# Patient Record
Sex: MALE | Race: WHITE | NOT HISPANIC OR LATINO | Employment: OTHER | URBAN - METROPOLITAN AREA
[De-identification: names, ages, dates, MRNs, and addresses within clinical notes are randomized per-mention and may not be internally consistent; named-entity substitution may affect disease eponyms.]

---

## 2020-01-01 ENCOUNTER — APPOINTMENT (OUTPATIENT)
Dept: MRI IMAGING | Facility: HOSPITAL | Age: 75
End: 2020-01-01

## 2020-01-01 ENCOUNTER — APPOINTMENT (OUTPATIENT)
Dept: GENERAL RADIOLOGY | Facility: HOSPITAL | Age: 75
End: 2020-01-01

## 2020-01-01 ENCOUNTER — LAB REQUISITION (OUTPATIENT)
Dept: LAB | Facility: HOSPITAL | Age: 75
End: 2020-01-01

## 2020-01-01 ENCOUNTER — HOSPITAL ENCOUNTER (INPATIENT)
Facility: HOSPITAL | Age: 75
LOS: 13 days | Discharge: HOSPICE | End: 2020-08-14
Attending: EMERGENCY MEDICINE | Admitting: INTERNAL MEDICINE

## 2020-01-01 ENCOUNTER — APPOINTMENT (OUTPATIENT)
Dept: CT IMAGING | Facility: HOSPITAL | Age: 75
End: 2020-01-01

## 2020-01-01 ENCOUNTER — HOSPITAL ENCOUNTER (INPATIENT)
Facility: HOSPITAL | Age: 75
LOS: 1 days | End: 2020-08-14
Attending: INTERNAL MEDICINE | Admitting: INTERNAL MEDICINE

## 2020-01-01 VITALS
DIASTOLIC BLOOD PRESSURE: 31 MMHG | WEIGHT: 163.8 LBS | HEIGHT: 67 IN | TEMPERATURE: 98.6 F | SYSTOLIC BLOOD PRESSURE: 64 MMHG | OXYGEN SATURATION: 96 % | RESPIRATION RATE: 16 BRPM | BODY MASS INDEX: 25.71 KG/M2 | HEART RATE: 57 BPM

## 2020-01-01 DIAGNOSIS — G93.41 ACUTE METABOLIC ENCEPHALOPATHY: Primary | ICD-10-CM

## 2020-01-01 DIAGNOSIS — Z00.00 ROUTINE GENERAL MEDICAL EXAMINATION AT A HEALTH CARE FACILITY: ICD-10-CM

## 2020-01-01 DIAGNOSIS — N18.6 ESRD (END STAGE RENAL DISEASE) ON DIALYSIS (HCC): ICD-10-CM

## 2020-01-01 DIAGNOSIS — N39.0 ACUTE UTI: ICD-10-CM

## 2020-01-01 DIAGNOSIS — Z99.2 ESRD (END STAGE RENAL DISEASE) ON DIALYSIS (HCC): ICD-10-CM

## 2020-01-01 DIAGNOSIS — R77.8 ELEVATED TROPONIN: ICD-10-CM

## 2020-01-01 LAB
ABO GROUP BLD: NORMAL
ALBUMIN SERPL-MCNC: 2.6 G/DL (ref 3.5–5.2)
ALBUMIN SERPL-MCNC: 2.6 G/DL (ref 3.5–5.2)
ALBUMIN SERPL-MCNC: 2.8 G/DL (ref 3.5–5.2)
ALBUMIN SERPL-MCNC: 2.9 G/DL (ref 3.5–5.2)
ALBUMIN SERPL-MCNC: 3 G/DL (ref 3.5–5.2)
ALBUMIN SERPL-MCNC: 3.1 G/DL (ref 3.5–5.2)
ALBUMIN SERPL-MCNC: 3.2 G/DL (ref 3.5–5.2)
ALBUMIN SERPL-MCNC: 3.2 G/DL (ref 3.5–5.2)
ALBUMIN/GLOB SERPL: 0.8 G/DL
ALBUMIN/GLOB SERPL: 0.9 G/DL
ALBUMIN/GLOB SERPL: 1.1 G/DL
ALP SERPL-CCNC: 155 U/L (ref 39–117)
ALP SERPL-CCNC: 59 U/L (ref 39–117)
ALP SERPL-CCNC: 66 U/L (ref 39–117)
ALT SERPL W P-5'-P-CCNC: 14 U/L (ref 1–41)
ALT SERPL W P-5'-P-CCNC: 15 U/L (ref 1–41)
ALT SERPL W P-5'-P-CCNC: 35 U/L (ref 1–41)
AMMONIA BLD-SCNC: 64 UMOL/L (ref 16–60)
AMPHET+METHAMPHET UR QL: NEGATIVE
AMYLASE SERPL-CCNC: 37 U/L (ref 28–100)
ANION GAP SERPL CALCULATED.3IONS-SCNC: 11.4 MMOL/L (ref 5–15)
ANION GAP SERPL CALCULATED.3IONS-SCNC: 11.9 MMOL/L (ref 5–15)
ANION GAP SERPL CALCULATED.3IONS-SCNC: 12 MMOL/L (ref 5–15)
ANION GAP SERPL CALCULATED.3IONS-SCNC: 12.1 MMOL/L (ref 5–15)
ANION GAP SERPL CALCULATED.3IONS-SCNC: 12.6 MMOL/L (ref 5–15)
ANION GAP SERPL CALCULATED.3IONS-SCNC: 12.7 MMOL/L (ref 5–15)
ANION GAP SERPL CALCULATED.3IONS-SCNC: 12.9 MMOL/L (ref 5–15)
ANION GAP SERPL CALCULATED.3IONS-SCNC: 12.9 MMOL/L (ref 5–15)
ANION GAP SERPL CALCULATED.3IONS-SCNC: 13.2 MMOL/L (ref 5–15)
ANION GAP SERPL CALCULATED.3IONS-SCNC: 13.4 MMOL/L (ref 5–15)
ANION GAP SERPL CALCULATED.3IONS-SCNC: 13.5 MMOL/L (ref 5–15)
ANION GAP SERPL CALCULATED.3IONS-SCNC: 6.3 MMOL/L (ref 5–15)
ANION GAP SERPL CALCULATED.3IONS-SCNC: 6.7 MMOL/L (ref 5–15)
ANION GAP SERPL CALCULATED.3IONS-SCNC: 7.5 MMOL/L (ref 5–15)
ANION GAP SERPL CALCULATED.3IONS-SCNC: 8.6 MMOL/L (ref 5–15)
ANISOCYTOSIS BLD QL: ABNORMAL
APAP SERPL-MCNC: <5 MCG/ML (ref 10–30)
APTT PPP: 25 SECONDS (ref 22.7–35.4)
AST SERPL-CCNC: 19 U/L (ref 1–40)
AST SERPL-CCNC: 19 U/L (ref 1–40)
AST SERPL-CCNC: 53 U/L (ref 1–40)
B PARAPERT DNA SPEC QL NAA+PROBE: NOT DETECTED
B PERT DNA SPEC QL NAA+PROBE: NOT DETECTED
BACTERIA SPEC AEROBE CULT: NO GROWTH
BACTERIA UR QL AUTO: ABNORMAL /HPF
BARBITURATES UR QL SCN: NEGATIVE
BASOPHILS # BLD AUTO: 0.04 10*3/MM3 (ref 0–0.2)
BASOPHILS # BLD AUTO: 0.07 10*3/MM3 (ref 0–0.2)
BASOPHILS # BLD AUTO: 0.07 10*3/MM3 (ref 0–0.2)
BASOPHILS # BLD AUTO: 0.08 10*3/MM3 (ref 0–0.2)
BASOPHILS # BLD AUTO: 0.09 10*3/MM3 (ref 0–0.2)
BASOPHILS # BLD AUTO: 0.09 10*3/MM3 (ref 0–0.2)
BASOPHILS # BLD AUTO: 0.1 10*3/MM3 (ref 0–0.2)
BASOPHILS # BLD MANUAL: 0.06 10*3/MM3 (ref 0–0.2)
BASOPHILS # BLD MANUAL: 0.13 10*3/MM3 (ref 0–0.2)
BASOPHILS NFR BLD AUTO: 0.6 % (ref 0–1.5)
BASOPHILS NFR BLD AUTO: 0.8 % (ref 0–1.5)
BASOPHILS NFR BLD AUTO: 0.9 % (ref 0–1.5)
BASOPHILS NFR BLD AUTO: 1 % (ref 0–1.5)
BASOPHILS NFR BLD AUTO: 1.3 % (ref 0–1.5)
BASOPHILS NFR BLD AUTO: 1.3 % (ref 0–1.5)
BASOPHILS NFR BLD AUTO: 1.5 % (ref 0–1.5)
BASOPHILS NFR BLD AUTO: 1.5 % (ref 0–1.5)
BASOPHILS NFR BLD AUTO: 1.6 % (ref 0–1.5)
BASOPHILS NFR BLD AUTO: 1.8 % (ref 0–1.5)
BASOPHILS NFR BLD AUTO: 2 % (ref 0–1.5)
BENZODIAZ UR QL SCN: NEGATIVE
BH BB BLOOD EXPIRATION DATE: NORMAL
BH BB BLOOD TYPE BARCODE: 5100
BH BB DISPENSE STATUS: NORMAL
BH BB PRODUCT CODE: NORMAL
BH BB UNIT NUMBER: NORMAL
BILIRUB SERPL-MCNC: 0.7 MG/DL (ref 0–1.2)
BILIRUB SERPL-MCNC: 1 MG/DL (ref 0–1.2)
BILIRUB SERPL-MCNC: 1.1 MG/DL (ref 0.2–1.2)
BILIRUB UR QL STRIP: NEGATIVE
BLD GP AB SCN SERPL QL: NEGATIVE
BUN BLD-MCNC: 10 MG/DL (ref 8–23)
BUN BLD-MCNC: 13 MG/DL (ref 8–23)
BUN BLD-MCNC: 23 MG/DL (ref 8–23)
BUN SERPL-MCNC: 18 MG/DL (ref 8–23)
BUN SERPL-MCNC: 28 MG/DL (ref 8–23)
BUN SERPL-MCNC: 29 MG/DL (ref 8–23)
BUN SERPL-MCNC: 34 MG/DL (ref 8–23)
BUN SERPL-MCNC: 38 MG/DL (ref 8–23)
BUN SERPL-MCNC: 40 MG/DL (ref 8–23)
BUN SERPL-MCNC: 47 MG/DL (ref 8–23)
BUN SERPL-MCNC: 48 MG/DL (ref 8–23)
BUN SERPL-MCNC: 56 MG/DL (ref 8–23)
BUN SERPL-MCNC: 58 MG/DL (ref 8–23)
BUN SERPL-MCNC: 60 MG/DL (ref 8–23)
BUN SERPL-MCNC: 80 MG/DL (ref 8–23)
BUN/CREAT SERPL: 10 (ref 7–25)
BUN/CREAT SERPL: 11 (ref 7–25)
BUN/CREAT SERPL: 12.8 (ref 7–25)
BUN/CREAT SERPL: 4 (ref 7–25)
BUN/CREAT SERPL: 4.1 (ref 7–25)
BUN/CREAT SERPL: 4.4 (ref 7–25)
BUN/CREAT SERPL: 5.2 (ref 7–25)
BUN/CREAT SERPL: 5.3 (ref 7–25)
BUN/CREAT SERPL: 5.4 (ref 7–25)
BUN/CREAT SERPL: 5.6 (ref 7–25)
BUN/CREAT SERPL: 6.1 (ref 7–25)
BUN/CREAT SERPL: 6.8 (ref 7–25)
BUN/CREAT SERPL: 7.1 (ref 7–25)
BUN/CREAT SERPL: 8.3 (ref 7–25)
BUN/CREAT SERPL: 9.9 (ref 7–25)
BURR CELLS BLD QL SMEAR: ABNORMAL
C PNEUM DNA NPH QL NAA+NON-PROBE: NOT DETECTED
CALCIUM SPEC-SCNC: 7.8 MG/DL (ref 8.6–10.5)
CALCIUM SPEC-SCNC: 7.9 MG/DL (ref 8.6–10.5)
CALCIUM SPEC-SCNC: 7.9 MG/DL (ref 8.6–10.5)
CALCIUM SPEC-SCNC: 8 MG/DL (ref 8.6–10.5)
CALCIUM SPEC-SCNC: 8 MG/DL (ref 8.6–10.5)
CALCIUM SPEC-SCNC: 8.1 MG/DL (ref 8.6–10.5)
CALCIUM SPEC-SCNC: 8.3 MG/DL (ref 8.6–10.5)
CALCIUM SPEC-SCNC: 8.3 MG/DL (ref 8.6–10.5)
CALCIUM SPEC-SCNC: 8.4 MG/DL (ref 8.6–10.5)
CALCIUM SPEC-SCNC: 8.6 MG/DL (ref 8.6–10.5)
CALCIUM SPEC-SCNC: 9.1 MG/DL (ref 8.6–10.5)
CALCIUM SPEC-SCNC: 9.5 MG/DL (ref 8.6–10.5)
CANNABINOIDS SERPL QL: NEGATIVE
CHLORIDE SERPL-SCNC: 100 MMOL/L (ref 98–107)
CHLORIDE SERPL-SCNC: 101 MMOL/L (ref 98–107)
CHLORIDE SERPL-SCNC: 102 MMOL/L (ref 98–107)
CHLORIDE SERPL-SCNC: 93 MMOL/L (ref 98–107)
CHLORIDE SERPL-SCNC: 94 MMOL/L (ref 98–107)
CHLORIDE SERPL-SCNC: 96 MMOL/L (ref 98–107)
CHLORIDE SERPL-SCNC: 96 MMOL/L (ref 98–107)
CHLORIDE SERPL-SCNC: 97 MMOL/L (ref 98–107)
CHLORIDE SERPL-SCNC: 98 MMOL/L (ref 98–107)
CHLORIDE SERPL-SCNC: 98 MMOL/L (ref 98–107)
CHLORIDE SERPL-SCNC: 99 MMOL/L (ref 98–107)
CHLORIDE SERPL-SCNC: 99 MMOL/L (ref 98–107)
CLARITY UR: CLEAR
CO2 SERPL-SCNC: 21.9 MMOL/L (ref 22–29)
CO2 SERPL-SCNC: 22.4 MMOL/L (ref 22–29)
CO2 SERPL-SCNC: 23.8 MMOL/L (ref 22–29)
CO2 SERPL-SCNC: 24.1 MMOL/L (ref 22–29)
CO2 SERPL-SCNC: 24.5 MMOL/L (ref 22–29)
CO2 SERPL-SCNC: 24.6 MMOL/L (ref 22–29)
CO2 SERPL-SCNC: 25.1 MMOL/L (ref 22–29)
CO2 SERPL-SCNC: 26.3 MMOL/L (ref 22–29)
CO2 SERPL-SCNC: 27.1 MMOL/L (ref 22–29)
CO2 SERPL-SCNC: 28 MMOL/L (ref 22–29)
CO2 SERPL-SCNC: 28.4 MMOL/L (ref 22–29)
CO2 SERPL-SCNC: 29.3 MMOL/L (ref 22–29)
CO2 SERPL-SCNC: 29.6 MMOL/L (ref 22–29)
CO2 SERPL-SCNC: 30.5 MMOL/L (ref 22–29)
CO2 SERPL-SCNC: 30.7 MMOL/L (ref 22–29)
COCAINE UR QL: NEGATIVE
COLOR UR: YELLOW
CREAT BLD-MCNC: 2.48 MG/DL (ref 0.76–1.27)
CREAT BLD-MCNC: 3.19 MG/DL (ref 0.76–1.27)
CREAT BLD-MCNC: 4.29 MG/DL (ref 0.76–1.27)
CREAT BLDA-MCNC: 4 MG/DL (ref 0.6–1.3)
CREAT SERPL-MCNC: 3.39 MG/DL (ref 0.76–1.27)
CREAT SERPL-MCNC: 3.97 MG/DL (ref 0.76–1.27)
CREAT SERPL-MCNC: 4.06 MG/DL (ref 0.76–1.27)
CREAT SERPL-MCNC: 4.38 MG/DL (ref 0.76–1.27)
CREAT SERPL-MCNC: 5.62 MG/DL (ref 0.76–1.27)
CREAT SERPL-MCNC: 6.04 MG/DL (ref 0.76–1.27)
CREAT SERPL-MCNC: 6.26 MG/DL (ref 0.76–1.27)
CREAT SERPL-MCNC: 6.99 MG/DL (ref 0.76–1.27)
CREAT SERPL-MCNC: 7.16 MG/DL (ref 0.76–1.27)
CREAT SERPL-MCNC: 7.19 MG/DL (ref 0.76–1.27)
CREAT SERPL-MCNC: 7.66 MG/DL (ref 0.76–1.27)
CREAT SERPL-MCNC: 8.29 MG/DL (ref 0.76–1.27)
CROSSMATCH INTERPRETATION: NORMAL
DEPRECATED RDW RBC AUTO: 50.3 FL (ref 37–54)
DEPRECATED RDW RBC AUTO: 50.6 FL (ref 37–54)
DEPRECATED RDW RBC AUTO: 50.7 FL (ref 37–54)
DEPRECATED RDW RBC AUTO: 50.7 FL (ref 37–54)
DEPRECATED RDW RBC AUTO: 51.2 FL (ref 37–54)
DEPRECATED RDW RBC AUTO: 52.2 FL (ref 37–54)
DEPRECATED RDW RBC AUTO: 52.2 FL (ref 37–54)
DEPRECATED RDW RBC AUTO: 52.8 FL (ref 37–54)
DEPRECATED RDW RBC AUTO: 53.3 FL (ref 37–54)
DEPRECATED RDW RBC AUTO: 53.5 FL (ref 37–54)
DEPRECATED RDW RBC AUTO: 53.6 FL (ref 37–54)
DEPRECATED RDW RBC AUTO: 53.8 FL (ref 37–54)
DEPRECATED RDW RBC AUTO: 54.6 FL (ref 37–54)
EOSINOPHIL # BLD AUTO: 0.07 10*3/MM3 (ref 0–0.4)
EOSINOPHIL # BLD AUTO: 0.08 10*3/MM3 (ref 0–0.4)
EOSINOPHIL # BLD AUTO: 0.12 10*3/MM3 (ref 0–0.4)
EOSINOPHIL # BLD AUTO: 0.14 10*3/MM3 (ref 0–0.4)
EOSINOPHIL # BLD AUTO: 0.17 10*3/MM3 (ref 0–0.4)
EOSINOPHIL # BLD AUTO: 0.19 10*3/MM3 (ref 0–0.4)
EOSINOPHIL # BLD AUTO: 0.22 10*3/MM3 (ref 0–0.4)
EOSINOPHIL # BLD AUTO: 0.24 10*3/MM3 (ref 0–0.4)
EOSINOPHIL # BLD AUTO: 0.35 10*3/MM3 (ref 0–0.4)
EOSINOPHIL # BLD MANUAL: 0.35 10*3/MM3 (ref 0–0.4)
EOSINOPHIL # BLD MANUAL: 0.8 10*3/MM3 (ref 0–0.4)
EOSINOPHIL NFR BLD AUTO: 0.9 % (ref 0.3–6.2)
EOSINOPHIL NFR BLD AUTO: 1.3 % (ref 0.3–6.2)
EOSINOPHIL NFR BLD AUTO: 1.7 % (ref 0.3–6.2)
EOSINOPHIL NFR BLD AUTO: 2.3 % (ref 0.3–6.2)
EOSINOPHIL NFR BLD AUTO: 2.5 % (ref 0.3–6.2)
EOSINOPHIL NFR BLD AUTO: 3.6 % (ref 0.3–6.2)
EOSINOPHIL NFR BLD AUTO: 3.7 % (ref 0.3–6.2)
EOSINOPHIL NFR BLD AUTO: 4 % (ref 0.3–6.2)
EOSINOPHIL NFR BLD AUTO: 6.1 % (ref 0.3–6.2)
EOSINOPHIL NFR BLD MANUAL: 12 % (ref 0.3–6.2)
EOSINOPHIL NFR BLD MANUAL: 6 % (ref 0.3–6.2)
ERYTHROCYTE [DISTWIDTH] IN BLOOD BY AUTOMATED COUNT: 15.2 % (ref 12.3–15.4)
ERYTHROCYTE [DISTWIDTH] IN BLOOD BY AUTOMATED COUNT: 15.2 % (ref 12.3–15.4)
ERYTHROCYTE [DISTWIDTH] IN BLOOD BY AUTOMATED COUNT: 15.3 % (ref 12.3–15.4)
ERYTHROCYTE [DISTWIDTH] IN BLOOD BY AUTOMATED COUNT: 15.4 % (ref 12.3–15.4)
ERYTHROCYTE [DISTWIDTH] IN BLOOD BY AUTOMATED COUNT: 15.6 % (ref 12.3–15.4)
ERYTHROCYTE [DISTWIDTH] IN BLOOD BY AUTOMATED COUNT: 15.7 % (ref 12.3–15.4)
ERYTHROCYTE [DISTWIDTH] IN BLOOD BY AUTOMATED COUNT: 15.8 % (ref 12.3–15.4)
ERYTHROCYTE [DISTWIDTH] IN BLOOD BY AUTOMATED COUNT: 15.8 % (ref 12.3–15.4)
ETHANOL BLD-MCNC: <10 MG/DL (ref 0–10)
ETHANOL UR QL: <0.01 %
FERRITIN SERPL-MCNC: 372 NG/ML (ref 30–400)
FLUAV H1 2009 PAND RNA NPH QL NAA+PROBE: NOT DETECTED
FLUAV H1 HA GENE NPH QL NAA+PROBE: NOT DETECTED
FLUAV H3 RNA NPH QL NAA+PROBE: NOT DETECTED
FLUAV SUBTYP SPEC NAA+PROBE: NOT DETECTED
FLUBV RNA ISLT QL NAA+PROBE: NOT DETECTED
FOLATE SERPL-MCNC: 3.4 NG/ML (ref 4.78–24.2)
GFR SERPL CREATININE-BSD FRML MDRD: 10 ML/MIN/1.73
GFR SERPL CREATININE-BSD FRML MDRD: 13 ML/MIN/1.73
GFR SERPL CREATININE-BSD FRML MDRD: 14 ML/MIN/1.73
GFR SERPL CREATININE-BSD FRML MDRD: 15 ML/MIN/1.73
GFR SERPL CREATININE-BSD FRML MDRD: 15 ML/MIN/1.73
GFR SERPL CREATININE-BSD FRML MDRD: 17 ML/MIN/1.73
GFR SERPL CREATININE-BSD FRML MDRD: 18 ML/MIN/1.73
GFR SERPL CREATININE-BSD FRML MDRD: 19 ML/MIN/1.73
GFR SERPL CREATININE-BSD FRML MDRD: 23 ML/MIN/1.73
GFR SERPL CREATININE-BSD FRML MDRD: 26 ML/MIN/1.73
GFR SERPL CREATININE-BSD FRML MDRD: 31 ML/MIN/1.73
GFR SERPL CREATININE-BSD FRML MDRD: 6 ML/MIN/1.73
GFR SERPL CREATININE-BSD FRML MDRD: 7 ML/MIN/1.73
GFR SERPL CREATININE-BSD FRML MDRD: 8 ML/MIN/1.73
GFR SERPL CREATININE-BSD FRML MDRD: 9 ML/MIN/1.73
GFR SERPL CREATININE-BSD FRML MDRD: 9 ML/MIN/1.73
GFR SERPL CREATININE-BSD FRML MDRD: ABNORMAL ML/MIN/{1.73_M2}
GLOBULIN UR ELPH-MCNC: 2.8 GM/DL
GLOBULIN UR ELPH-MCNC: 3.5 GM/DL
GLOBULIN UR ELPH-MCNC: 3.5 GM/DL
GLUCOSE BLD-MCNC: 120 MG/DL (ref 65–99)
GLUCOSE BLD-MCNC: 156 MG/DL (ref 65–99)
GLUCOSE BLD-MCNC: 187 MG/DL (ref 65–99)
GLUCOSE BLDC GLUCOMTR-MCNC: 124 MG/DL (ref 70–130)
GLUCOSE BLDC GLUCOMTR-MCNC: 134 MG/DL (ref 70–130)
GLUCOSE BLDC GLUCOMTR-MCNC: 135 MG/DL (ref 70–130)
GLUCOSE BLDC GLUCOMTR-MCNC: 139 MG/DL (ref 70–130)
GLUCOSE BLDC GLUCOMTR-MCNC: 139 MG/DL (ref 70–130)
GLUCOSE BLDC GLUCOMTR-MCNC: 141 MG/DL (ref 70–130)
GLUCOSE BLDC GLUCOMTR-MCNC: 141 MG/DL (ref 70–130)
GLUCOSE BLDC GLUCOMTR-MCNC: 144 MG/DL (ref 70–130)
GLUCOSE BLDC GLUCOMTR-MCNC: 150 MG/DL (ref 70–130)
GLUCOSE BLDC GLUCOMTR-MCNC: 153 MG/DL (ref 70–130)
GLUCOSE BLDC GLUCOMTR-MCNC: 153 MG/DL (ref 70–130)
GLUCOSE BLDC GLUCOMTR-MCNC: 160 MG/DL (ref 70–130)
GLUCOSE BLDC GLUCOMTR-MCNC: 161 MG/DL (ref 70–130)
GLUCOSE BLDC GLUCOMTR-MCNC: 161 MG/DL (ref 70–130)
GLUCOSE BLDC GLUCOMTR-MCNC: 164 MG/DL (ref 70–130)
GLUCOSE BLDC GLUCOMTR-MCNC: 165 MG/DL (ref 70–130)
GLUCOSE BLDC GLUCOMTR-MCNC: 168 MG/DL (ref 70–130)
GLUCOSE BLDC GLUCOMTR-MCNC: 169 MG/DL (ref 70–130)
GLUCOSE BLDC GLUCOMTR-MCNC: 180 MG/DL (ref 70–130)
GLUCOSE BLDC GLUCOMTR-MCNC: 181 MG/DL (ref 70–130)
GLUCOSE BLDC GLUCOMTR-MCNC: 189 MG/DL (ref 70–130)
GLUCOSE BLDC GLUCOMTR-MCNC: 192 MG/DL (ref 70–130)
GLUCOSE BLDC GLUCOMTR-MCNC: 193 MG/DL (ref 70–130)
GLUCOSE BLDC GLUCOMTR-MCNC: 196 MG/DL (ref 70–130)
GLUCOSE BLDC GLUCOMTR-MCNC: 198 MG/DL (ref 70–130)
GLUCOSE BLDC GLUCOMTR-MCNC: 204 MG/DL (ref 70–130)
GLUCOSE BLDC GLUCOMTR-MCNC: 208 MG/DL (ref 70–130)
GLUCOSE BLDC GLUCOMTR-MCNC: 216 MG/DL (ref 70–130)
GLUCOSE BLDC GLUCOMTR-MCNC: 219 MG/DL (ref 70–130)
GLUCOSE BLDC GLUCOMTR-MCNC: 223 MG/DL (ref 70–130)
GLUCOSE BLDC GLUCOMTR-MCNC: 224 MG/DL (ref 70–130)
GLUCOSE BLDC GLUCOMTR-MCNC: 230 MG/DL (ref 70–130)
GLUCOSE BLDC GLUCOMTR-MCNC: 238 MG/DL (ref 70–130)
GLUCOSE BLDC GLUCOMTR-MCNC: 245 MG/DL (ref 70–130)
GLUCOSE BLDC GLUCOMTR-MCNC: 250 MG/DL (ref 70–130)
GLUCOSE BLDC GLUCOMTR-MCNC: 268 MG/DL (ref 70–130)
GLUCOSE BLDC GLUCOMTR-MCNC: 282 MG/DL (ref 70–130)
GLUCOSE BLDC GLUCOMTR-MCNC: 287 MG/DL (ref 70–130)
GLUCOSE BLDC GLUCOMTR-MCNC: 92 MG/DL (ref 70–130)
GLUCOSE SERPL-MCNC: 122 MG/DL (ref 65–99)
GLUCOSE SERPL-MCNC: 142 MG/DL (ref 65–99)
GLUCOSE SERPL-MCNC: 148 MG/DL (ref 65–99)
GLUCOSE SERPL-MCNC: 149 MG/DL (ref 65–99)
GLUCOSE SERPL-MCNC: 151 MG/DL (ref 65–99)
GLUCOSE SERPL-MCNC: 152 MG/DL (ref 65–99)
GLUCOSE SERPL-MCNC: 156 MG/DL (ref 65–99)
GLUCOSE SERPL-MCNC: 157 MG/DL (ref 65–99)
GLUCOSE SERPL-MCNC: 166 MG/DL (ref 65–99)
GLUCOSE SERPL-MCNC: 190 MG/DL (ref 65–99)
GLUCOSE SERPL-MCNC: 207 MG/DL (ref 65–99)
GLUCOSE SERPL-MCNC: 211 MG/DL (ref 65–99)
GLUCOSE UR STRIP-MCNC: ABNORMAL MG/DL
HADV DNA SPEC NAA+PROBE: NOT DETECTED
HAPTOGLOB SERPL-MCNC: 140 MG/DL (ref 30–200)
HBA1C MFR BLD: 5.87 % (ref 4.8–5.6)
HCOV 229E RNA SPEC QL NAA+PROBE: NOT DETECTED
HCOV HKU1 RNA SPEC QL NAA+PROBE: NOT DETECTED
HCOV NL63 RNA SPEC QL NAA+PROBE: NOT DETECTED
HCOV OC43 RNA SPEC QL NAA+PROBE: NOT DETECTED
HCT VFR BLD AUTO: 20 % (ref 37.5–51)
HCT VFR BLD AUTO: 23.3 % (ref 37.5–51)
HCT VFR BLD AUTO: 23.3 % (ref 37.5–51)
HCT VFR BLD AUTO: 24 % (ref 37.5–51)
HCT VFR BLD AUTO: 24.7 % (ref 37.5–51)
HCT VFR BLD AUTO: 26.9 % (ref 37.5–51)
HCT VFR BLD AUTO: 27.6 % (ref 37.5–51)
HCT VFR BLD AUTO: 27.7 % (ref 37.5–51)
HCT VFR BLD AUTO: 28.8 % (ref 37.5–51)
HCT VFR BLD AUTO: 29.4 % (ref 37.5–51)
HCT VFR BLD AUTO: 30.3 % (ref 37.5–51)
HCT VFR BLD AUTO: 31.8 % (ref 37.5–51)
HCT VFR BLD AUTO: 33.9 % (ref 37.5–51)
HEMOCCULT STL QL: POSITIVE
HGB BLD-MCNC: 10 G/DL (ref 13–17.7)
HGB BLD-MCNC: 10.5 G/DL (ref 13–17.7)
HGB BLD-MCNC: 6.5 G/DL (ref 13–17.7)
HGB BLD-MCNC: 7 G/DL (ref 13–17.7)
HGB BLD-MCNC: 7.3 G/DL (ref 13–17.7)
HGB BLD-MCNC: 7.6 G/DL (ref 13–17.7)
HGB BLD-MCNC: 7.9 G/DL (ref 13–17.7)
HGB BLD-MCNC: 8.5 G/DL (ref 13–17.7)
HGB BLD-MCNC: 8.9 G/DL (ref 13–17.7)
HGB BLD-MCNC: 8.9 G/DL (ref 13–17.7)
HGB BLD-MCNC: 9.1 G/DL (ref 13–17.7)
HGB BLD-MCNC: 9.3 G/DL (ref 13–17.7)
HGB BLD-MCNC: 9.6 G/DL (ref 13–17.7)
HGB RETIC QN AUTO: 27.7 PG (ref 29.8–36.1)
HGB UR QL STRIP.AUTO: ABNORMAL
HMPV RNA NPH QL NAA+NON-PROBE: NOT DETECTED
HOLD SPECIMEN: NORMAL
HOLD SPECIMEN: NORMAL
HPIV1 RNA SPEC QL NAA+PROBE: NOT DETECTED
HPIV2 RNA SPEC QL NAA+PROBE: NOT DETECTED
HPIV3 RNA NPH QL NAA+PROBE: NOT DETECTED
HPIV4 P GENE NPH QL NAA+PROBE: NOT DETECTED
HYALINE CASTS UR QL AUTO: ABNORMAL /LPF
HYPOCHROMIA BLD QL: ABNORMAL
IMM GRANULOCYTES # BLD AUTO: 0.04 10*3/MM3 (ref 0–0.05)
IMM GRANULOCYTES # BLD AUTO: 0.06 10*3/MM3 (ref 0–0.05)
IMM GRANULOCYTES # BLD AUTO: 0.06 10*3/MM3 (ref 0–0.05)
IMM GRANULOCYTES # BLD AUTO: 0.07 10*3/MM3 (ref 0–0.05)
IMM GRANULOCYTES # BLD AUTO: 0.08 10*3/MM3 (ref 0–0.05)
IMM GRANULOCYTES # BLD AUTO: 0.1 10*3/MM3 (ref 0–0.05)
IMM GRANULOCYTES # BLD AUTO: 0.14 10*3/MM3 (ref 0–0.05)
IMM GRANULOCYTES # BLD AUTO: 0.21 10*3/MM3 (ref 0–0.05)
IMM GRANULOCYTES # BLD AUTO: 0.22 10*3/MM3 (ref 0–0.05)
IMM GRANULOCYTES NFR BLD AUTO: 0.8 % (ref 0–0.5)
IMM GRANULOCYTES NFR BLD AUTO: 0.9 % (ref 0–0.5)
IMM GRANULOCYTES NFR BLD AUTO: 1 % (ref 0–0.5)
IMM GRANULOCYTES NFR BLD AUTO: 1 % (ref 0–0.5)
IMM GRANULOCYTES NFR BLD AUTO: 1.2 % (ref 0–0.5)
IMM GRANULOCYTES NFR BLD AUTO: 1.3 % (ref 0–0.5)
IMM GRANULOCYTES NFR BLD AUTO: 2.3 % (ref 0–0.5)
IMM GRANULOCYTES NFR BLD AUTO: 3.8 % (ref 0–0.5)
IMM GRANULOCYTES NFR BLD AUTO: 4.1 % (ref 0–0.5)
IMM RETICS NFR: 37.4 % (ref 3–15.8)
INR PPP: 1.08 (ref 0.9–1.1)
IRON 24H UR-MRATE: 50 MCG/DL (ref 59–158)
IRON SATN MFR SERPL: 21 % (ref 20–50)
KETONES UR QL STRIP: NEGATIVE
LDH SERPL-CCNC: 254 U/L (ref 135–225)
LEUKOCYTE ESTERASE UR QL STRIP.AUTO: ABNORMAL
LIPASE SERPL-CCNC: 23 U/L (ref 13–60)
LYMPHOCYTES # BLD AUTO: 0.42 10*3/MM3 (ref 0.7–3.1)
LYMPHOCYTES # BLD AUTO: 0.46 10*3/MM3 (ref 0.7–3.1)
LYMPHOCYTES # BLD AUTO: 0.53 10*3/MM3 (ref 0.7–3.1)
LYMPHOCYTES # BLD AUTO: 0.55 10*3/MM3 (ref 0.7–3.1)
LYMPHOCYTES # BLD AUTO: 0.58 10*3/MM3 (ref 0.7–3.1)
LYMPHOCYTES # BLD AUTO: 0.63 10*3/MM3 (ref 0.7–3.1)
LYMPHOCYTES # BLD AUTO: 0.63 10*3/MM3 (ref 0.7–3.1)
LYMPHOCYTES # BLD AUTO: 0.67 10*3/MM3 (ref 0.7–3.1)
LYMPHOCYTES # BLD AUTO: 0.81 10*3/MM3 (ref 0.7–3.1)
LYMPHOCYTES # BLD MANUAL: 0.69 10*3/MM3 (ref 0.7–3.1)
LYMPHOCYTES # BLD MANUAL: 0.73 10*3/MM3 (ref 0.7–3.1)
LYMPHOCYTES NFR BLD AUTO: 10.4 % (ref 19.6–45.3)
LYMPHOCYTES NFR BLD AUTO: 12.1 % (ref 19.6–45.3)
LYMPHOCYTES NFR BLD AUTO: 13.5 % (ref 19.6–45.3)
LYMPHOCYTES NFR BLD AUTO: 6.8 % (ref 19.6–45.3)
LYMPHOCYTES NFR BLD AUTO: 6.8 % (ref 19.6–45.3)
LYMPHOCYTES NFR BLD AUTO: 8.1 % (ref 19.6–45.3)
LYMPHOCYTES NFR BLD AUTO: 8.5 % (ref 19.6–45.3)
LYMPHOCYTES NFR BLD AUTO: 9 % (ref 19.6–45.3)
LYMPHOCYTES NFR BLD AUTO: 9.6 % (ref 19.6–45.3)
LYMPHOCYTES NFR BLD MANUAL: 11 % (ref 19.6–45.3)
LYMPHOCYTES NFR BLD MANUAL: 12 % (ref 19.6–45.3)
LYMPHOCYTES NFR BLD MANUAL: 3 % (ref 5–12)
LYMPHOCYTES NFR BLD MANUAL: 9 % (ref 5–12)
M PNEUMO IGG SER IA-ACNC: NOT DETECTED
MAGNESIUM SERPL-MCNC: 1.8 MG/DL (ref 1.6–2.4)
MAGNESIUM SERPL-MCNC: 2 MG/DL (ref 1.6–2.4)
MAGNESIUM SERPL-MCNC: 2.1 MG/DL (ref 1.6–2.4)
MCH RBC QN AUTO: 27.9 PG (ref 26.6–33)
MCH RBC QN AUTO: 28.8 PG (ref 26.6–33)
MCH RBC QN AUTO: 29 PG (ref 26.6–33)
MCH RBC QN AUTO: 29.1 PG (ref 26.6–33)
MCH RBC QN AUTO: 29.2 PG (ref 26.6–33)
MCH RBC QN AUTO: 29.3 PG (ref 26.6–33)
MCH RBC QN AUTO: 29.7 PG (ref 26.6–33)
MCH RBC QN AUTO: 29.8 PG (ref 26.6–33)
MCH RBC QN AUTO: 29.8 PG (ref 26.6–33)
MCHC RBC AUTO-ENTMCNC: 30 G/DL (ref 31.5–35.7)
MCHC RBC AUTO-ENTMCNC: 30.8 G/DL (ref 31.5–35.7)
MCHC RBC AUTO-ENTMCNC: 31 G/DL (ref 31.5–35.7)
MCHC RBC AUTO-ENTMCNC: 31 G/DL (ref 31.5–35.7)
MCHC RBC AUTO-ENTMCNC: 31.3 G/DL (ref 31.5–35.7)
MCHC RBC AUTO-ENTMCNC: 31.4 G/DL (ref 31.5–35.7)
MCHC RBC AUTO-ENTMCNC: 31.7 G/DL (ref 31.5–35.7)
MCHC RBC AUTO-ENTMCNC: 31.7 G/DL (ref 31.5–35.7)
MCHC RBC AUTO-ENTMCNC: 32 G/DL (ref 31.5–35.7)
MCHC RBC AUTO-ENTMCNC: 32.1 G/DL (ref 31.5–35.7)
MCHC RBC AUTO-ENTMCNC: 32.3 G/DL (ref 31.5–35.7)
MCHC RBC AUTO-ENTMCNC: 32.5 G/DL (ref 31.5–35.7)
MCHC RBC AUTO-ENTMCNC: 33.1 G/DL (ref 31.5–35.7)
MCV RBC AUTO: 88.2 FL (ref 79–97)
MCV RBC AUTO: 88.8 FL (ref 79–97)
MCV RBC AUTO: 91.1 FL (ref 79–97)
MCV RBC AUTO: 91.3 FL (ref 79–97)
MCV RBC AUTO: 92.3 FL (ref 79–97)
MCV RBC AUTO: 92.3 FL (ref 79–97)
MCV RBC AUTO: 92.8 FL (ref 79–97)
MCV RBC AUTO: 93 FL (ref 79–97)
MCV RBC AUTO: 93.8 FL (ref 79–97)
MCV RBC AUTO: 94.1 FL (ref 79–97)
MCV RBC AUTO: 94.2 FL (ref 79–97)
MCV RBC AUTO: 95.2 FL (ref 79–97)
MCV RBC AUTO: 96.7 FL (ref 79–97)
METAMYELOCYTES NFR BLD MANUAL: 1 % (ref 0–0)
METHADONE UR QL SCN: NEGATIVE
MONOCYTES # BLD AUTO: 0.2 10*3/MM3 (ref 0.1–0.9)
MONOCYTES # BLD AUTO: 0.52 10*3/MM3 (ref 0.1–0.9)
MONOCYTES # BLD AUTO: 0.65 10*3/MM3 (ref 0.1–0.9)
MONOCYTES # BLD AUTO: 0.66 10*3/MM3 (ref 0.1–0.9)
MONOCYTES # BLD AUTO: 0.67 10*3/MM3 (ref 0.1–0.9)
MONOCYTES # BLD AUTO: 0.71 10*3/MM3 (ref 0.1–0.9)
MONOCYTES # BLD AUTO: 0.73 10*3/MM3 (ref 0.1–0.9)
MONOCYTES # BLD AUTO: 0.8 10*3/MM3 (ref 0.1–0.9)
MONOCYTES # BLD AUTO: 0.85 10*3/MM3 (ref 0.1–0.9)
MONOCYTES # BLD AUTO: 0.99 10*3/MM3 (ref 0.1–0.9)
MONOCYTES # BLD AUTO: 1.15 10*3/MM3 (ref 0.1–0.9)
MONOCYTES NFR BLD AUTO: 11.7 % (ref 5–12)
MONOCYTES NFR BLD AUTO: 11.7 % (ref 5–12)
MONOCYTES NFR BLD AUTO: 11.8 % (ref 5–12)
MONOCYTES NFR BLD AUTO: 12.5 % (ref 5–12)
MONOCYTES NFR BLD AUTO: 12.7 % (ref 5–12)
MONOCYTES NFR BLD AUTO: 13.3 % (ref 5–12)
MONOCYTES NFR BLD AUTO: 13.9 % (ref 5–12)
NEUTROPHILS # BLD AUTO: 4.11 10*3/MM3 (ref 1.7–7)
NEUTROPHILS # BLD AUTO: 4.8 10*3/MM3 (ref 1.7–7)
NEUTROPHILS NFR BLD AUTO: 3.51 10*3/MM3 (ref 1.7–7)
NEUTROPHILS NFR BLD AUTO: 3.66 10*3/MM3 (ref 1.7–7)
NEUTROPHILS NFR BLD AUTO: 3.87 10*3/MM3 (ref 1.7–7)
NEUTROPHILS NFR BLD AUTO: 4 10*3/MM3 (ref 1.7–7)
NEUTROPHILS NFR BLD AUTO: 4.26 10*3/MM3 (ref 1.7–7)
NEUTROPHILS NFR BLD AUTO: 4.89 10*3/MM3 (ref 1.7–7)
NEUTROPHILS NFR BLD AUTO: 5.03 10*3/MM3 (ref 1.7–7)
NEUTROPHILS NFR BLD AUTO: 6.06 10*3/MM3 (ref 1.7–7)
NEUTROPHILS NFR BLD AUTO: 6.19 10*3/MM3 (ref 1.7–7)
NEUTROPHILS NFR BLD AUTO: 66.7 % (ref 42.7–76)
NEUTROPHILS NFR BLD AUTO: 67.2 % (ref 42.7–76)
NEUTROPHILS NFR BLD AUTO: 68.7 % (ref 42.7–76)
NEUTROPHILS NFR BLD AUTO: 70.6 % (ref 42.7–76)
NEUTROPHILS NFR BLD AUTO: 70.6 % (ref 42.7–76)
NEUTROPHILS NFR BLD AUTO: 74.1 % (ref 42.7–76)
NEUTROPHILS NFR BLD AUTO: 74.5 % (ref 42.7–76)
NEUTROPHILS NFR BLD AUTO: 77.4 % (ref 42.7–76)
NEUTROPHILS NFR BLD AUTO: 78.6 % (ref 42.7–76)
NEUTROPHILS NFR BLD MANUAL: 71 % (ref 42.7–76)
NEUTROPHILS NFR BLD MANUAL: 72 % (ref 42.7–76)
NITRITE UR QL STRIP: NEGATIVE
NRBC BLD AUTO-RTO: 0 /100 WBC (ref 0–0.2)
NRBC BLD AUTO-RTO: 0.2 /100 WBC (ref 0–0.2)
OPIATES UR QL: NEGATIVE
OXYCODONE UR QL SCN: NEGATIVE
PH UR STRIP.AUTO: 8 [PH] (ref 5–8)
PHOSPHATE SERPL-MCNC: 3.7 MG/DL (ref 2.5–4.5)
PHOSPHATE SERPL-MCNC: 4.6 MG/DL (ref 2.5–4.5)
PHOSPHATE SERPL-MCNC: 4.8 MG/DL (ref 2.5–4.5)
PHOSPHATE SERPL-MCNC: 6 MG/DL (ref 2.5–4.5)
PHOSPHATE SERPL-MCNC: 6.8 MG/DL (ref 2.5–4.5)
PHOSPHATE SERPL-MCNC: 6.8 MG/DL (ref 2.5–4.5)
PHOSPHATE SERPL-MCNC: 8.1 MG/DL (ref 2.5–4.5)
PLAT MORPH BLD: NORMAL
PLAT MORPH BLD: NORMAL
PLATELET # BLD AUTO: 106 10*3/MM3 (ref 140–450)
PLATELET # BLD AUTO: 107 10*3/MM3 (ref 140–450)
PLATELET # BLD AUTO: 109 10*3/MM3 (ref 140–450)
PLATELET # BLD AUTO: 117 10*3/MM3 (ref 140–450)
PLATELET # BLD AUTO: 130 10*3/MM3 (ref 140–450)
PLATELET # BLD AUTO: 78 10*3/MM3 (ref 140–450)
PLATELET # BLD AUTO: 89 10*3/MM3 (ref 140–450)
PLATELET # BLD AUTO: 89 10*3/MM3 (ref 140–450)
PLATELET # BLD AUTO: 90 10*3/MM3 (ref 140–450)
PLATELET # BLD AUTO: 91 10*3/MM3 (ref 140–450)
PLATELET # BLD AUTO: 93 10*3/MM3 (ref 140–450)
PLATELET # BLD AUTO: 94 10*3/MM3 (ref 140–450)
PLATELET # BLD AUTO: 97 10*3/MM3 (ref 140–450)
PLATELET # BLD AUTO: 99 10*3/MM3 (ref 140–450)
PLATELETS.RETICULATED NFR BLD AUTO: 6.1 % (ref 0.9–6.5)
PMV BLD AUTO: 11.1 FL (ref 6–12)
PMV BLD AUTO: 11.1 FL (ref 6–12)
PMV BLD AUTO: 11.2 FL (ref 6–12)
PMV BLD AUTO: 11.3 FL (ref 6–12)
PMV BLD AUTO: 11.4 FL (ref 6–12)
PMV BLD AUTO: 11.4 FL (ref 6–12)
PMV BLD AUTO: 11.6 FL (ref 6–12)
PMV BLD AUTO: 11.6 FL (ref 6–12)
PMV BLD AUTO: 11.8 FL (ref 6–12)
PMV BLD AUTO: 11.9 FL (ref 6–12)
PMV BLD AUTO: 12 FL (ref 6–12)
PMV BLD AUTO: 12 FL (ref 6–12)
PMV BLD AUTO: 12.1 FL (ref 6–12)
POIKILOCYTOSIS BLD QL SMEAR: ABNORMAL
POLYCHROMASIA BLD QL SMEAR: ABNORMAL
POTASSIUM BLD-SCNC: 3.5 MMOL/L (ref 3.5–5.2)
POTASSIUM BLD-SCNC: 3.9 MMOL/L (ref 3.5–5.2)
POTASSIUM BLD-SCNC: 4 MMOL/L (ref 3.5–5.2)
POTASSIUM SERPL-SCNC: 3.7 MMOL/L (ref 3.5–5.2)
POTASSIUM SERPL-SCNC: 3.9 MMOL/L (ref 3.5–5.2)
POTASSIUM SERPL-SCNC: 4 MMOL/L (ref 3.5–5.2)
POTASSIUM SERPL-SCNC: 4.2 MMOL/L (ref 3.5–5.2)
POTASSIUM SERPL-SCNC: 4.2 MMOL/L (ref 3.5–5.2)
POTASSIUM SERPL-SCNC: 4.4 MMOL/L (ref 3.5–5.2)
POTASSIUM SERPL-SCNC: 4.6 MMOL/L (ref 3.5–5.2)
POTASSIUM SERPL-SCNC: 4.6 MMOL/L (ref 3.5–5.2)
POTASSIUM SERPL-SCNC: 4.7 MMOL/L (ref 3.5–5.2)
POTASSIUM SERPL-SCNC: 4.9 MMOL/L (ref 3.5–5.2)
POTASSIUM SERPL-SCNC: 4.9 MMOL/L (ref 3.5–5.2)
POTASSIUM SERPL-SCNC: 5.1 MMOL/L (ref 3.5–5.2)
PROT SERPL-MCNC: 6 G/DL (ref 6–8.5)
PROT SERPL-MCNC: 6.3 G/DL (ref 6–8.5)
PROT SERPL-MCNC: 6.7 G/DL (ref 6–8.5)
PROT UR QL STRIP: ABNORMAL
PROTHROMBIN TIME: 13.9 SECONDS (ref 11.7–14.2)
RBC # BLD AUTO: 2.19 10*6/MM3 (ref 4.14–5.8)
RBC # BLD AUTO: 2.41 10*6/MM3 (ref 4.14–5.8)
RBC # BLD AUTO: 2.51 10*6/MM3 (ref 4.14–5.8)
RBC # BLD AUTO: 2.55 10*6/MM3 (ref 4.14–5.8)
RBC # BLD AUTO: 2.71 10*6/MM3 (ref 4.14–5.8)
RBC # BLD AUTO: 2.9 10*6/MM3 (ref 4.14–5.8)
RBC # BLD AUTO: 3 10*6/MM3 (ref 4.14–5.8)
RBC # BLD AUTO: 3.05 10*6/MM3 (ref 4.14–5.8)
RBC # BLD AUTO: 3.12 10*6/MM3 (ref 4.14–5.8)
RBC # BLD AUTO: 3.16 10*6/MM3 (ref 4.14–5.8)
RBC # BLD AUTO: 3.23 10*6/MM3 (ref 4.14–5.8)
RBC # BLD AUTO: 3.58 10*6/MM3 (ref 4.14–5.8)
RBC # BLD AUTO: 3.6 10*6/MM3 (ref 4.14–5.8)
RBC # UR: ABNORMAL /HPF
REF LAB TEST METHOD: ABNORMAL
RETICS/RBC NFR AUTO: 3.16 % (ref 0.7–1.9)
RH BLD: POSITIVE
RHINOVIRUS RNA SPEC NAA+PROBE: NOT DETECTED
RSV RNA NPH QL NAA+NON-PROBE: NOT DETECTED
SALICYLATES SERPL-MCNC: <0.3 MG/DL
SARS-COV-2 RNA NPH QL NAA+NON-PROBE: NOT DETECTED
SODIUM BLD-SCNC: 137 MMOL/L (ref 136–145)
SODIUM SERPL-SCNC: 130 MMOL/L (ref 136–145)
SODIUM SERPL-SCNC: 131 MMOL/L (ref 136–145)
SODIUM SERPL-SCNC: 133 MMOL/L (ref 136–145)
SODIUM SERPL-SCNC: 134 MMOL/L (ref 136–145)
SODIUM SERPL-SCNC: 135 MMOL/L (ref 136–145)
SODIUM SERPL-SCNC: 136 MMOL/L (ref 136–145)
SODIUM SERPL-SCNC: 138 MMOL/L (ref 136–145)
SODIUM SERPL-SCNC: 138 MMOL/L (ref 136–145)
SODIUM SERPL-SCNC: 139 MMOL/L (ref 136–145)
SODIUM SERPL-SCNC: 140 MMOL/L (ref 136–145)
SP GR UR STRIP: 1.01 (ref 1–1.03)
SQUAMOUS #/AREA URNS HPF: ABNORMAL /HPF
T&S EXPIRATION DATE: NORMAL
TIBC SERPL-MCNC: 234 MCG/DL (ref 298–536)
TRANSFERRIN SERPL-MCNC: 157 MG/DL (ref 200–360)
TROPONIN T SERPL-MCNC: 0.13 NG/ML (ref 0–0.03)
TROPONIN T SERPL-MCNC: 0.15 NG/ML (ref 0–0.03)
TROPONIN T SERPL-MCNC: 5.62 NG/ML (ref 0–0.03)
UNIT  ABO: NORMAL
UNIT  RH: NORMAL
UROBILINOGEN UR QL STRIP: ABNORMAL
VIT B12 BLD-MCNC: 1143 PG/ML (ref 211–946)
WBC # BLD AUTO: 10.38 10*3/MM3 (ref 3.4–10.8)
WBC # BLD AUTO: 5.11 10*3/MM3 (ref 3.4–10.8)
WBC # BLD AUTO: 5.19 10*3/MM3 (ref 3.4–10.8)
WBC # BLD AUTO: 5.75 10*3/MM3 (ref 3.4–10.8)
WBC # BLD AUTO: 6 10*3/MM3 (ref 3.4–10.8)
WBC # BLD AUTO: 6.04 10*3/MM3 (ref 3.4–10.8)
WBC # BLD AUTO: 6.22 10*3/MM3 (ref 3.4–10.8)
WBC # BLD AUTO: 6.67 10*3/MM3 (ref 3.4–10.8)
WBC # BLD AUTO: 6.79 10*3/MM3 (ref 3.4–10.8)
WBC # BLD AUTO: 7.82 10*3/MM3 (ref 3.4–10.8)
WBC # BLD AUTO: 7.87 10*3/MM3 (ref 3.4–10.8)
WBC # BLD AUTO: 8.3 10*3/MM3 (ref 3.4–10.8)
WBC MORPH BLD: NORMAL
WBC MORPH BLD: NORMAL
WBC NRBC COR # BLD: 5.79 10*3/MM3 (ref 3.4–10.8)
WBC UR QL AUTO: ABNORMAL /HPF
WHOLE BLOOD HOLD SPECIMEN: NORMAL
WHOLE BLOOD HOLD SPECIMEN: NORMAL

## 2020-01-01 PROCEDURE — 85025 COMPLETE CBC W/AUTO DIFF WBC: CPT

## 2020-01-01 PROCEDURE — 83735 ASSAY OF MAGNESIUM: CPT | Performed by: EMERGENCY MEDICINE

## 2020-01-01 PROCEDURE — 83690 ASSAY OF LIPASE: CPT

## 2020-01-01 PROCEDURE — 83036 HEMOGLOBIN GLYCOSYLATED A1C: CPT | Performed by: INTERNAL MEDICINE

## 2020-01-01 PROCEDURE — 85025 COMPLETE CBC W/AUTO DIFF WBC: CPT | Performed by: INTERNAL MEDICINE

## 2020-01-01 PROCEDURE — 80307 DRUG TEST PRSMV CHEM ANLYZR: CPT | Performed by: EMERGENCY MEDICINE

## 2020-01-01 PROCEDURE — 99222 1ST HOSP IP/OBS MODERATE 55: CPT | Performed by: INTERNAL MEDICINE

## 2020-01-01 PROCEDURE — 99233 SBSQ HOSP IP/OBS HIGH 50: CPT | Performed by: NURSE PRACTITIONER

## 2020-01-01 PROCEDURE — 86901 BLOOD TYPING SEROLOGIC RH(D): CPT | Performed by: INTERNAL MEDICINE

## 2020-01-01 PROCEDURE — 86850 RBC ANTIBODY SCREEN: CPT | Performed by: EMERGENCY MEDICINE

## 2020-01-01 PROCEDURE — 83615 LACTATE (LD) (LDH) ENZYME: CPT | Performed by: INTERNAL MEDICINE

## 2020-01-01 PROCEDURE — 94799 UNLISTED PULMONARY SVC/PX: CPT

## 2020-01-01 PROCEDURE — 25010000002 IOPAMIDOL 61 % SOLUTION: Performed by: EMERGENCY MEDICINE

## 2020-01-01 PROCEDURE — P9047 ALBUMIN (HUMAN), 25%, 50ML: HCPCS | Performed by: INTERNAL MEDICINE

## 2020-01-01 PROCEDURE — 82962 GLUCOSE BLOOD TEST: CPT

## 2020-01-01 PROCEDURE — 80048 BASIC METABOLIC PNL TOTAL CA: CPT | Performed by: INTERNAL MEDICINE

## 2020-01-01 PROCEDURE — 25010000002 CEFTRIAXONE PER 250 MG: Performed by: EMERGENCY MEDICINE

## 2020-01-01 PROCEDURE — 25010000002 LORAZEPAM PER 2 MG: Performed by: PSYCHIATRY & NEUROLOGY

## 2020-01-01 PROCEDURE — 80053 COMPREHEN METABOLIC PANEL: CPT | Performed by: INTERNAL MEDICINE

## 2020-01-01 PROCEDURE — 82565 ASSAY OF CREATININE: CPT

## 2020-01-01 PROCEDURE — 82728 ASSAY OF FERRITIN: CPT | Performed by: INTERNAL MEDICINE

## 2020-01-01 PROCEDURE — 83735 ASSAY OF MAGNESIUM: CPT | Performed by: ADVANCED PRACTICE MIDWIFE

## 2020-01-01 PROCEDURE — 99232 SBSQ HOSP IP/OBS MODERATE 35: CPT | Performed by: INTERNAL MEDICINE

## 2020-01-01 PROCEDURE — 25010000002 MORPHINE PER 10 MG: Performed by: INTERNAL MEDICINE

## 2020-01-01 PROCEDURE — 93010 ELECTROCARDIOGRAM REPORT: CPT | Performed by: INTERNAL MEDICINE

## 2020-01-01 PROCEDURE — 80069 RENAL FUNCTION PANEL: CPT | Performed by: INTERNAL MEDICINE

## 2020-01-01 PROCEDURE — 99222 1ST HOSP IP/OBS MODERATE 55: CPT | Performed by: PSYCHIATRY & NEUROLOGY

## 2020-01-01 PROCEDURE — 80048 BASIC METABOLIC PNL TOTAL CA: CPT | Performed by: NURSE PRACTITIONER

## 2020-01-01 PROCEDURE — 97110 THERAPEUTIC EXERCISES: CPT

## 2020-01-01 PROCEDURE — 70498 CT ANGIOGRAPHY NECK: CPT

## 2020-01-01 PROCEDURE — 84484 ASSAY OF TROPONIN QUANT: CPT | Performed by: EMERGENCY MEDICINE

## 2020-01-01 PROCEDURE — 85730 THROMBOPLASTIN TIME PARTIAL: CPT | Performed by: EMERGENCY MEDICINE

## 2020-01-01 PROCEDURE — 86923 COMPATIBILITY TEST ELECTRIC: CPT

## 2020-01-01 PROCEDURE — 83010 ASSAY OF HAPTOGLOBIN QUANT: CPT | Performed by: INTERNAL MEDICINE

## 2020-01-01 PROCEDURE — 80048 BASIC METABOLIC PNL TOTAL CA: CPT

## 2020-01-01 PROCEDURE — P9612 CATHETERIZE FOR URINE SPEC: HCPCS

## 2020-01-01 PROCEDURE — 93005 ELECTROCARDIOGRAM TRACING: CPT | Performed by: INTERNAL MEDICINE

## 2020-01-01 PROCEDURE — 70496 CT ANGIOGRAPHY HEAD: CPT

## 2020-01-01 PROCEDURE — 86900 BLOOD TYPING SEROLOGIC ABO: CPT | Performed by: INTERNAL MEDICINE

## 2020-01-01 PROCEDURE — 25010000002 CEFTRIAXONE PER 250 MG: Performed by: INTERNAL MEDICINE

## 2020-01-01 PROCEDURE — 25010000002 LORAZEPAM PER 2 MG: Performed by: INTERNAL MEDICINE

## 2020-01-01 PROCEDURE — P9016 RBC LEUKOCYTES REDUCED: HCPCS

## 2020-01-01 PROCEDURE — 99231 SBSQ HOSP IP/OBS SF/LOW 25: CPT | Performed by: INTERNAL MEDICINE

## 2020-01-01 PROCEDURE — 85610 PROTHROMBIN TIME: CPT | Performed by: EMERGENCY MEDICINE

## 2020-01-01 PROCEDURE — 87086 URINE CULTURE/COLONY COUNT: CPT | Performed by: EMERGENCY MEDICINE

## 2020-01-01 PROCEDURE — 83735 ASSAY OF MAGNESIUM: CPT | Performed by: INTERNAL MEDICINE

## 2020-01-01 PROCEDURE — 82607 VITAMIN B-12: CPT | Performed by: INTERNAL MEDICINE

## 2020-01-01 PROCEDURE — 99232 SBSQ HOSP IP/OBS MODERATE 35: CPT | Performed by: NURSE PRACTITIONER

## 2020-01-01 PROCEDURE — 99285 EMERGENCY DEPT VISIT HI MDM: CPT

## 2020-01-01 PROCEDURE — 85055 RETICULATED PLATELET ASSAY: CPT | Performed by: INTERNAL MEDICINE

## 2020-01-01 PROCEDURE — 80053 COMPREHEN METABOLIC PANEL: CPT | Performed by: EMERGENCY MEDICINE

## 2020-01-01 PROCEDURE — 80053 COMPREHEN METABOLIC PANEL: CPT

## 2020-01-01 PROCEDURE — 63710000001 INSULIN LISPRO (HUMAN) PER 5 UNITS: Performed by: INTERNAL MEDICINE

## 2020-01-01 PROCEDURE — 82150 ASSAY OF AMYLASE: CPT

## 2020-01-01 PROCEDURE — 70551 MRI BRAIN STEM W/O DYE: CPT

## 2020-01-01 PROCEDURE — 81001 URINALYSIS AUTO W/SCOPE: CPT | Performed by: EMERGENCY MEDICINE

## 2020-01-01 PROCEDURE — 25010000002 ONDANSETRON PER 1 MG: Performed by: INTERNAL MEDICINE

## 2020-01-01 PROCEDURE — 25010000003 LEVETIRACETAM IN NACL 0.75% 1000 MG/100ML SOLUTION: Performed by: EMERGENCY MEDICINE

## 2020-01-01 PROCEDURE — 83540 ASSAY OF IRON: CPT | Performed by: INTERNAL MEDICINE

## 2020-01-01 PROCEDURE — 85027 COMPLETE CBC AUTOMATED: CPT | Performed by: INTERNAL MEDICINE

## 2020-01-01 PROCEDURE — 80069 RENAL FUNCTION PANEL: CPT | Performed by: NURSE PRACTITIONER

## 2020-01-01 PROCEDURE — 99221 1ST HOSP IP/OBS SF/LOW 40: CPT | Performed by: INTERNAL MEDICINE

## 2020-01-01 PROCEDURE — 25010000002 HYDROMORPHONE PER 4 MG: Performed by: INTERNAL MEDICINE

## 2020-01-01 PROCEDURE — 85027 COMPLETE CBC AUTOMATED: CPT | Performed by: NURSE PRACTITIONER

## 2020-01-01 PROCEDURE — 82140 ASSAY OF AMMONIA: CPT | Performed by: EMERGENCY MEDICINE

## 2020-01-01 PROCEDURE — 92610 EVALUATE SWALLOWING FUNCTION: CPT

## 2020-01-01 PROCEDURE — 85007 BL SMEAR W/DIFF WBC COUNT: CPT | Performed by: INTERNAL MEDICINE

## 2020-01-01 PROCEDURE — 85025 COMPLETE CBC W/AUTO DIFF WBC: CPT | Performed by: EMERGENCY MEDICINE

## 2020-01-01 PROCEDURE — 86900 BLOOD TYPING SEROLOGIC ABO: CPT

## 2020-01-01 PROCEDURE — 86901 BLOOD TYPING SEROLOGIC RH(D): CPT | Performed by: EMERGENCY MEDICINE

## 2020-01-01 PROCEDURE — 86850 RBC ANTIBODY SCREEN: CPT | Performed by: INTERNAL MEDICINE

## 2020-01-01 PROCEDURE — 93005 ELECTROCARDIOGRAM TRACING: CPT | Performed by: EMERGENCY MEDICINE

## 2020-01-01 PROCEDURE — 85046 RETICYTE/HGB CONCENTRATE: CPT | Performed by: INTERNAL MEDICINE

## 2020-01-01 PROCEDURE — 0042T HC CT CEREBRAL PERFUSION W/WO CONTRAST: CPT

## 2020-01-01 PROCEDURE — 25010000002 EPOETIN ALFA-EPBX 10000 UNIT/ML SOLUTION: Performed by: INTERNAL MEDICINE

## 2020-01-01 PROCEDURE — 97162 PT EVAL MOD COMPLEX 30 MIN: CPT

## 2020-01-01 PROCEDURE — 82272 OCCULT BLD FECES 1-3 TESTS: CPT | Performed by: INTERNAL MEDICINE

## 2020-01-01 PROCEDURE — 84484 ASSAY OF TROPONIN QUANT: CPT | Performed by: INTERNAL MEDICINE

## 2020-01-01 PROCEDURE — 71045 X-RAY EXAM CHEST 1 VIEW: CPT

## 2020-01-01 PROCEDURE — 86900 BLOOD TYPING SEROLOGIC ABO: CPT | Performed by: EMERGENCY MEDICINE

## 2020-01-01 PROCEDURE — 82746 ASSAY OF FOLIC ACID SERUM: CPT | Performed by: INTERNAL MEDICINE

## 2020-01-01 PROCEDURE — 85025 COMPLETE CBC W/AUTO DIFF WBC: CPT | Performed by: NURSE PRACTITIONER

## 2020-01-01 PROCEDURE — 25010000002 ALBUMIN HUMAN 25% PER 50 ML: Performed by: INTERNAL MEDICINE

## 2020-01-01 PROCEDURE — 84466 ASSAY OF TRANSFERRIN: CPT | Performed by: INTERNAL MEDICINE

## 2020-01-01 PROCEDURE — 0202U NFCT DS 22 TRGT SARS-COV-2: CPT | Performed by: EMERGENCY MEDICINE

## 2020-01-01 RX ORDER — LORAZEPAM 2 MG/ML
0.5 INJECTION INTRAMUSCULAR
Status: DISCONTINUED | OUTPATIENT
Start: 2020-01-01 | End: 2020-01-01 | Stop reason: HOSPADM

## 2020-01-01 RX ORDER — MORPHINE SULFATE 2 MG/ML
2 INJECTION, SOLUTION INTRAMUSCULAR; INTRAVENOUS
Status: DISCONTINUED | OUTPATIENT
Start: 2020-01-01 | End: 2020-01-01 | Stop reason: HOSPADM

## 2020-01-01 RX ORDER — SCOLOPAMINE TRANSDERMAL SYSTEM 1 MG/1
1 PATCH, EXTENDED RELEASE TRANSDERMAL
Status: DISCONTINUED | OUTPATIENT
Start: 2020-01-01 | End: 2020-08-15 | Stop reason: HOSPADM

## 2020-01-01 RX ORDER — LORAZEPAM 2 MG/ML
2 CONCENTRATE ORAL
Status: CANCELLED | OUTPATIENT
Start: 2020-01-01 | End: 2020-08-23

## 2020-01-01 RX ORDER — MORPHINE SULFATE 4 MG/ML
4 INJECTION, SOLUTION INTRAMUSCULAR; INTRAVENOUS
Status: DISCONTINUED | OUTPATIENT
Start: 2020-01-01 | End: 2020-08-15 | Stop reason: HOSPADM

## 2020-01-01 RX ORDER — ACETAMINOPHEN 325 MG/1
650 TABLET ORAL EVERY 4 HOURS PRN
Status: DISCONTINUED | OUTPATIENT
Start: 2020-01-01 | End: 2020-08-15 | Stop reason: HOSPADM

## 2020-01-01 RX ORDER — ACETAMINOPHEN 160 MG/5ML
650 SOLUTION ORAL EVERY 4 HOURS PRN
Status: DISCONTINUED | OUTPATIENT
Start: 2020-01-01 | End: 2020-08-15 | Stop reason: HOSPADM

## 2020-01-01 RX ORDER — TEMAZEPAM 15 MG/1
15 CAPSULE ORAL NIGHTLY
COMMUNITY

## 2020-01-01 RX ORDER — SUCRALFATE 1 G/1
1 TABLET ORAL 4 TIMES DAILY
COMMUNITY

## 2020-01-01 RX ORDER — HYDROMORPHONE HYDROCHLORIDE 1 MG/ML
0.5 INJECTION, SOLUTION INTRAMUSCULAR; INTRAVENOUS; SUBCUTANEOUS
Status: DISCONTINUED | OUTPATIENT
Start: 2020-01-01 | End: 2020-08-15 | Stop reason: HOSPADM

## 2020-01-01 RX ORDER — ACETAMINOPHEN 325 MG/1
650 TABLET ORAL EVERY 4 HOURS PRN
Status: DISCONTINUED | OUTPATIENT
Start: 2020-01-01 | End: 2020-01-01 | Stop reason: HOSPADM

## 2020-01-01 RX ORDER — ONDANSETRON 2 MG/ML
4 INJECTION INTRAMUSCULAR; INTRAVENOUS EVERY 6 HOURS PRN
Status: DISCONTINUED | OUTPATIENT
Start: 2020-01-01 | End: 2020-01-01 | Stop reason: HOSPADM

## 2020-01-01 RX ORDER — MORPHINE SULFATE 10 MG/ML
6 INJECTION INTRAMUSCULAR; INTRAVENOUS; SUBCUTANEOUS
Status: CANCELLED | OUTPATIENT
Start: 2020-01-01 | End: 2020-08-23

## 2020-01-01 RX ORDER — MORPHINE SULFATE 20 MG/ML
20 SOLUTION ORAL
Status: DISCONTINUED | OUTPATIENT
Start: 2020-01-01 | End: 2020-08-15 | Stop reason: HOSPADM

## 2020-01-01 RX ORDER — OLANZAPINE 10 MG/1
10 TABLET, ORALLY DISINTEGRATING ORAL 2 TIMES DAILY
Status: DISCONTINUED | OUTPATIENT
Start: 2020-01-01 | End: 2020-01-01

## 2020-01-01 RX ORDER — HYDROMORPHONE HCL 110MG/55ML
1.5 PATIENT CONTROLLED ANALGESIA SYRINGE INTRAVENOUS
Status: DISCONTINUED | OUTPATIENT
Start: 2020-01-01 | End: 2020-08-15 | Stop reason: HOSPADM

## 2020-01-01 RX ORDER — LORAZEPAM 2 MG/ML
0.5 CONCENTRATE ORAL
Status: CANCELLED | OUTPATIENT
Start: 2020-01-01 | End: 2020-08-23

## 2020-01-01 RX ORDER — MORPHINE SULFATE 20 MG/ML
10 SOLUTION ORAL
Status: DISCONTINUED | OUTPATIENT
Start: 2020-01-01 | End: 2020-08-15 | Stop reason: HOSPADM

## 2020-01-01 RX ORDER — ALBUTEROL SULFATE 90 UG/1
2 AEROSOL, METERED RESPIRATORY (INHALATION) EVERY 6 HOURS PRN
COMMUNITY

## 2020-01-01 RX ORDER — CEFTRIAXONE SODIUM 1 G/50ML
1 INJECTION, SOLUTION INTRAVENOUS ONCE
Status: COMPLETED | OUTPATIENT
Start: 2020-01-01 | End: 2020-01-01

## 2020-01-01 RX ORDER — BISACODYL 10 MG
10 SUPPOSITORY, RECTAL RECTAL DAILY
Status: DISCONTINUED | OUTPATIENT
Start: 2020-01-01 | End: 2020-01-01 | Stop reason: HOSPADM

## 2020-01-01 RX ORDER — MORPHINE SULFATE 20 MG/ML
5 SOLUTION ORAL
Status: CANCELLED | OUTPATIENT
Start: 2020-01-01 | End: 2020-08-23

## 2020-01-01 RX ORDER — LORAZEPAM 2 MG/ML
0.5 CONCENTRATE ORAL
Status: DISCONTINUED | OUTPATIENT
Start: 2020-01-01 | End: 2020-01-01 | Stop reason: HOSPADM

## 2020-01-01 RX ORDER — LORAZEPAM 2 MG/ML
0.5 INJECTION INTRAMUSCULAR
Status: CANCELLED | OUTPATIENT
Start: 2020-01-01 | End: 2020-08-23

## 2020-01-01 RX ORDER — LORAZEPAM 2 MG/ML
1 CONCENTRATE ORAL
Status: CANCELLED | OUTPATIENT
Start: 2020-01-01 | End: 2020-08-23

## 2020-01-01 RX ORDER — FLUOXETINE HYDROCHLORIDE 20 MG/1
20 CAPSULE ORAL DAILY
COMMUNITY

## 2020-01-01 RX ORDER — DIPHENOXYLATE HYDROCHLORIDE AND ATROPINE SULFATE 2.5; .025 MG/1; MG/1
1 TABLET ORAL
Status: CANCELLED | OUTPATIENT
Start: 2020-01-01

## 2020-01-01 RX ORDER — NITROGLYCERIN 0.4 MG/1
0.4 TABLET SUBLINGUAL
Status: DISCONTINUED | OUTPATIENT
Start: 2020-01-01 | End: 2020-01-01 | Stop reason: HOSPADM

## 2020-01-01 RX ORDER — MORPHINE SULFATE 10 MG/ML
6 INJECTION INTRAMUSCULAR; INTRAVENOUS; SUBCUTANEOUS
Status: DISCONTINUED | OUTPATIENT
Start: 2020-01-01 | End: 2020-08-15 | Stop reason: HOSPADM

## 2020-01-01 RX ORDER — LORAZEPAM 2 MG/ML
2 CONCENTRATE ORAL
Status: DISCONTINUED | OUTPATIENT
Start: 2020-01-01 | End: 2020-08-15 | Stop reason: HOSPADM

## 2020-01-01 RX ORDER — OLANZAPINE 10 MG/1
10 TABLET, ORALLY DISINTEGRATING ORAL DAILY
Status: DISCONTINUED | OUTPATIENT
Start: 2020-01-01 | End: 2020-01-01 | Stop reason: HOSPADM

## 2020-01-01 RX ORDER — LEVETIRACETAM 10 MG/ML
1000 INJECTION INTRAVASCULAR ONCE
Status: COMPLETED | OUTPATIENT
Start: 2020-01-01 | End: 2020-01-01

## 2020-01-01 RX ORDER — HYDROXYZINE 50 MG/1
50 TABLET, FILM COATED ORAL EVERY 12 HOURS
COMMUNITY

## 2020-01-01 RX ORDER — ACETAMINOPHEN 650 MG/1
650 SUPPOSITORY RECTAL EVERY 4 HOURS PRN
Status: DISCONTINUED | OUTPATIENT
Start: 2020-01-01 | End: 2020-08-15 | Stop reason: HOSPADM

## 2020-01-01 RX ORDER — SCOLOPAMINE TRANSDERMAL SYSTEM 1 MG/1
1 PATCH, EXTENDED RELEASE TRANSDERMAL
Status: DISCONTINUED | OUTPATIENT
Start: 2020-01-01 | End: 2020-01-01 | Stop reason: HOSPADM

## 2020-01-01 RX ORDER — ATORVASTATIN CALCIUM 40 MG/1
40 TABLET, FILM COATED ORAL DAILY
COMMUNITY

## 2020-01-01 RX ORDER — SODIUM CHLORIDE 0.9 % (FLUSH) 0.9 %
10 SYRINGE (ML) INJECTION AS NEEDED
Status: DISCONTINUED | OUTPATIENT
Start: 2020-01-01 | End: 2020-01-01 | Stop reason: HOSPADM

## 2020-01-01 RX ORDER — OLANZAPINE 10 MG/1
10 TABLET, ORALLY DISINTEGRATING ORAL NIGHTLY
Status: DISCONTINUED | OUTPATIENT
Start: 2020-01-01 | End: 2020-01-01

## 2020-01-01 RX ORDER — MORPHINE SULFATE 2 MG/ML
2 INJECTION, SOLUTION INTRAMUSCULAR; INTRAVENOUS
Status: CANCELLED | OUTPATIENT
Start: 2020-01-01 | End: 2020-08-23

## 2020-01-01 RX ORDER — ALBUMIN (HUMAN) 12.5 G/50ML
25 SOLUTION INTRAVENOUS 4 TIMES DAILY PRN
Status: DISCONTINUED | OUTPATIENT
Start: 2020-01-01 | End: 2020-01-01 | Stop reason: HOSPADM

## 2020-01-01 RX ORDER — MORPHINE SULFATE 2 MG/ML
2 INJECTION, SOLUTION INTRAMUSCULAR; INTRAVENOUS ONCE
Status: COMPLETED | OUTPATIENT
Start: 2020-01-01 | End: 2020-01-01

## 2020-01-01 RX ORDER — HYDROCODONE BITARTRATE AND ACETAMINOPHEN 5; 325 MG/1; MG/1
1 TABLET ORAL EVERY 6 HOURS PRN
COMMUNITY

## 2020-01-01 RX ORDER — LORAZEPAM 2 MG/ML
2 INJECTION INTRAMUSCULAR
Status: CANCELLED | OUTPATIENT
Start: 2020-01-01 | End: 2020-08-23

## 2020-01-01 RX ORDER — SCOLOPAMINE TRANSDERMAL SYSTEM 1 MG/1
1 PATCH, EXTENDED RELEASE TRANSDERMAL
Status: CANCELLED | OUTPATIENT
Start: 2020-01-01

## 2020-01-01 RX ORDER — MORPHINE SULFATE 20 MG/ML
10 SOLUTION ORAL
Status: DISCONTINUED | OUTPATIENT
Start: 2020-01-01 | End: 2020-01-01 | Stop reason: HOSPADM

## 2020-01-01 RX ORDER — DIPHENOXYLATE HYDROCHLORIDE AND ATROPINE SULFATE 2.5; .025 MG/1; MG/1
1 TABLET ORAL
Status: DISCONTINUED | OUTPATIENT
Start: 2020-01-01 | End: 2020-08-15 | Stop reason: HOSPADM

## 2020-01-01 RX ORDER — ASPIRIN 81 MG/1
81 TABLET, CHEWABLE ORAL DAILY
Status: DISCONTINUED | OUTPATIENT
Start: 2020-01-01 | End: 2020-01-01 | Stop reason: HOSPADM

## 2020-01-01 RX ORDER — ALBUMIN (HUMAN) 12.5 G/50ML
12.5 SOLUTION INTRAVENOUS AS NEEDED
Status: ACTIVE | OUTPATIENT
Start: 2020-01-01 | End: 2020-01-01

## 2020-01-01 RX ORDER — HYDROMORPHONE HCL 110MG/55ML
1.5 PATIENT CONTROLLED ANALGESIA SYRINGE INTRAVENOUS
Status: CANCELLED | OUTPATIENT
Start: 2020-01-01 | End: 2020-08-23

## 2020-01-01 RX ORDER — MORPHINE SULFATE 20 MG/ML
5 SOLUTION ORAL
Status: DISCONTINUED | OUTPATIENT
Start: 2020-01-01 | End: 2020-01-01 | Stop reason: HOSPADM

## 2020-01-01 RX ORDER — MORPHINE SULFATE 2 MG/ML
6 INJECTION, SOLUTION INTRAMUSCULAR; INTRAVENOUS
Status: DISCONTINUED | OUTPATIENT
Start: 2020-01-01 | End: 2020-01-01 | Stop reason: HOSPADM

## 2020-01-01 RX ORDER — FOLIC ACID 1 MG/1
1 TABLET ORAL DAILY
Status: DISCONTINUED | OUTPATIENT
Start: 2020-01-01 | End: 2020-01-01 | Stop reason: HOSPADM

## 2020-01-01 RX ORDER — DEXTROSE AND SODIUM CHLORIDE 5; .45 G/100ML; G/100ML
100 INJECTION, SOLUTION INTRAVENOUS CONTINUOUS
Status: DISCONTINUED | OUTPATIENT
Start: 2020-01-01 | End: 2020-01-01

## 2020-01-01 RX ORDER — LORAZEPAM 2 MG/ML
1 INJECTION INTRAMUSCULAR
Status: DISCONTINUED | OUTPATIENT
Start: 2020-01-01 | End: 2020-01-01

## 2020-01-01 RX ORDER — MORPHINE SULFATE 20 MG/ML
20 SOLUTION ORAL
Status: DISCONTINUED | OUTPATIENT
Start: 2020-01-01 | End: 2020-01-01 | Stop reason: HOSPADM

## 2020-01-01 RX ORDER — ACETAMINOPHEN 650 MG/1
650 SUPPOSITORY RECTAL EVERY 4 HOURS PRN
Status: CANCELLED | OUTPATIENT
Start: 2020-01-01

## 2020-01-01 RX ORDER — ACETAMINOPHEN 325 MG/1
650 TABLET ORAL EVERY 4 HOURS PRN
Status: CANCELLED | OUTPATIENT
Start: 2020-01-01

## 2020-01-01 RX ORDER — LORAZEPAM 2 MG/ML
1 INJECTION INTRAMUSCULAR
Status: DISCONTINUED | OUTPATIENT
Start: 2020-01-01 | End: 2020-08-15 | Stop reason: HOSPADM

## 2020-01-01 RX ORDER — LOSARTAN POTASSIUM 25 MG/1
25 TABLET ORAL DAILY
COMMUNITY

## 2020-01-01 RX ORDER — HALOPERIDOL 5 MG/ML
2 INJECTION INTRAMUSCULAR
Status: DISCONTINUED | OUTPATIENT
Start: 2020-01-01 | End: 2020-01-01

## 2020-01-01 RX ORDER — ACETAMINOPHEN 160 MG/5ML
650 SOLUTION ORAL EVERY 4 HOURS PRN
Status: DISCONTINUED | OUTPATIENT
Start: 2020-01-01 | End: 2020-01-01 | Stop reason: HOSPADM

## 2020-01-01 RX ORDER — LORAZEPAM 2 MG/ML
1 CONCENTRATE ORAL
Status: DISCONTINUED | OUTPATIENT
Start: 2020-01-01 | End: 2020-01-01 | Stop reason: HOSPADM

## 2020-01-01 RX ORDER — LORAZEPAM 2 MG/ML
1 CONCENTRATE ORAL
Status: DISCONTINUED | OUTPATIENT
Start: 2020-01-01 | End: 2020-08-15 | Stop reason: HOSPADM

## 2020-01-01 RX ORDER — ACETAMINOPHEN 160 MG/5ML
650 SOLUTION ORAL EVERY 4 HOURS PRN
Status: CANCELLED | OUTPATIENT
Start: 2020-01-01

## 2020-01-01 RX ORDER — MIDODRINE HYDROCHLORIDE 5 MG/1
10 TABLET ORAL DAILY
COMMUNITY
End: 2020-01-01

## 2020-01-01 RX ORDER — HYDROMORPHONE HYDROCHLORIDE 1 MG/ML
0.5 INJECTION, SOLUTION INTRAMUSCULAR; INTRAVENOUS; SUBCUTANEOUS
Status: DISCONTINUED | OUTPATIENT
Start: 2020-01-01 | End: 2020-01-01 | Stop reason: HOSPADM

## 2020-01-01 RX ORDER — OLANZAPINE 10 MG/1
10 INJECTION, POWDER, LYOPHILIZED, FOR SOLUTION INTRAMUSCULAR EVERY 8 HOURS PRN
Status: DISCONTINUED | OUTPATIENT
Start: 2020-01-01 | End: 2020-01-01

## 2020-01-01 RX ORDER — HYDROMORPHONE HYDROCHLORIDE 1 MG/ML
0.5 INJECTION, SOLUTION INTRAMUSCULAR; INTRAVENOUS; SUBCUTANEOUS
Status: CANCELLED | OUTPATIENT
Start: 2020-01-01 | End: 2020-08-23

## 2020-01-01 RX ORDER — DIPHENOXYLATE HYDROCHLORIDE AND ATROPINE SULFATE 2.5; .025 MG/1; MG/1
1 TABLET ORAL
Status: DISCONTINUED | OUTPATIENT
Start: 2020-01-01 | End: 2020-01-01 | Stop reason: HOSPADM

## 2020-01-01 RX ORDER — MORPHINE SULFATE 4 MG/ML
4 INJECTION, SOLUTION INTRAMUSCULAR; INTRAVENOUS
Status: CANCELLED | OUTPATIENT
Start: 2020-01-01 | End: 2020-08-23

## 2020-01-01 RX ORDER — LORAZEPAM 2 MG/ML
2 CONCENTRATE ORAL
Status: DISCONTINUED | OUTPATIENT
Start: 2020-01-01 | End: 2020-01-01 | Stop reason: HOSPADM

## 2020-01-01 RX ORDER — ALBUMIN (HUMAN) 12.5 G/50ML
12.5 SOLUTION INTRAVENOUS AS NEEDED
Status: DISPENSED | OUTPATIENT
Start: 2020-01-01 | End: 2020-01-01

## 2020-01-01 RX ORDER — MORPHINE SULFATE 2 MG/ML
4 INJECTION, SOLUTION INTRAMUSCULAR; INTRAVENOUS
Status: DISCONTINUED | OUTPATIENT
Start: 2020-01-01 | End: 2020-01-01 | Stop reason: HOSPADM

## 2020-01-01 RX ORDER — MORPHINE SULFATE 20 MG/ML
20 SOLUTION ORAL
Status: CANCELLED | OUTPATIENT
Start: 2020-01-01 | End: 2020-08-23

## 2020-01-01 RX ORDER — MIDODRINE HYDROCHLORIDE 10 MG/1
10 TABLET ORAL DAILY
COMMUNITY

## 2020-01-01 RX ORDER — LORAZEPAM 2 MG/ML
2 INJECTION INTRAMUSCULAR
Status: DISCONTINUED | OUTPATIENT
Start: 2020-01-01 | End: 2020-01-01 | Stop reason: HOSPADM

## 2020-01-01 RX ORDER — DEXTROSE MONOHYDRATE 25 G/50ML
25 INJECTION, SOLUTION INTRAVENOUS
Status: DISCONTINUED | OUTPATIENT
Start: 2020-01-01 | End: 2020-01-01 | Stop reason: HOSPADM

## 2020-01-01 RX ORDER — OLANZAPINE 10 MG/1
5 INJECTION, POWDER, LYOPHILIZED, FOR SOLUTION INTRAMUSCULAR EVERY 8 HOURS PRN
Status: DISCONTINUED | OUTPATIENT
Start: 2020-01-01 | End: 2020-01-01

## 2020-01-01 RX ORDER — LORAZEPAM 2 MG/ML
0.5 CONCENTRATE ORAL
Status: DISCONTINUED | OUTPATIENT
Start: 2020-01-01 | End: 2020-08-15 | Stop reason: HOSPADM

## 2020-01-01 RX ORDER — MORPHINE SULFATE 20 MG/ML
10 SOLUTION ORAL
Status: CANCELLED | OUTPATIENT
Start: 2020-01-01 | End: 2020-08-23

## 2020-01-01 RX ORDER — LORAZEPAM 2 MG/ML
1 INJECTION INTRAMUSCULAR
Status: DISCONTINUED | OUTPATIENT
Start: 2020-01-01 | End: 2020-01-01 | Stop reason: HOSPADM

## 2020-01-01 RX ORDER — OLANZAPINE 10 MG/1
20 TABLET, ORALLY DISINTEGRATING ORAL NIGHTLY
Status: DISCONTINUED | OUTPATIENT
Start: 2020-01-01 | End: 2020-01-01 | Stop reason: HOSPADM

## 2020-01-01 RX ORDER — OLANZAPINE 10 MG/1
10 TABLET, ORALLY DISINTEGRATING ORAL 2 TIMES DAILY
Start: 2020-01-01

## 2020-01-01 RX ORDER — ACETAMINOPHEN 650 MG/1
650 SUPPOSITORY RECTAL EVERY 4 HOURS PRN
Status: DISCONTINUED | OUTPATIENT
Start: 2020-01-01 | End: 2020-01-01 | Stop reason: HOSPADM

## 2020-01-01 RX ORDER — LORAZEPAM 2 MG/ML
2 INJECTION INTRAMUSCULAR
Status: DISCONTINUED | OUTPATIENT
Start: 2020-01-01 | End: 2020-08-15 | Stop reason: HOSPADM

## 2020-01-01 RX ORDER — MORPHINE SULFATE 20 MG/ML
5 SOLUTION ORAL
Status: DISCONTINUED | OUTPATIENT
Start: 2020-01-01 | End: 2020-08-15 | Stop reason: HOSPADM

## 2020-01-01 RX ORDER — POLYETHYLENE GLYCOL 3350 17 G/17G
17 POWDER, FOR SOLUTION ORAL 2 TIMES DAILY PRN
Status: DISCONTINUED | OUTPATIENT
Start: 2020-01-01 | End: 2020-01-01 | Stop reason: HOSPADM

## 2020-01-01 RX ORDER — MORPHINE SULFATE 2 MG/ML
2 INJECTION, SOLUTION INTRAMUSCULAR; INTRAVENOUS
Status: DISCONTINUED | OUTPATIENT
Start: 2020-01-01 | End: 2020-08-15 | Stop reason: HOSPADM

## 2020-01-01 RX ORDER — CLOPIDOGREL BISULFATE 75 MG/1
75 TABLET ORAL DAILY
COMMUNITY

## 2020-01-01 RX ORDER — LORAZEPAM 2 MG/ML
0.5 INJECTION INTRAMUSCULAR
Status: DISCONTINUED | OUTPATIENT
Start: 2020-01-01 | End: 2020-08-15 | Stop reason: HOSPADM

## 2020-01-01 RX ORDER — PANTOPRAZOLE SODIUM 40 MG/1
40 TABLET, DELAYED RELEASE ORAL 2 TIMES DAILY
COMMUNITY

## 2020-01-01 RX ORDER — CEFTRIAXONE SODIUM 1 G/50ML
1 INJECTION, SOLUTION INTRAVENOUS EVERY 24 HOURS
Status: DISCONTINUED | OUTPATIENT
Start: 2020-01-01 | End: 2020-01-01

## 2020-01-01 RX ORDER — LORAZEPAM 2 MG/ML
1 INJECTION INTRAMUSCULAR
Status: CANCELLED | OUTPATIENT
Start: 2020-01-01 | End: 2020-08-23

## 2020-01-01 RX ORDER — HEPARIN SODIUM 1000 [USP'U]/ML
4000 INJECTION, SOLUTION INTRAVENOUS; SUBCUTANEOUS ONCE
Status: DISCONTINUED | OUTPATIENT
Start: 2020-01-01 | End: 2020-01-01

## 2020-01-01 RX ORDER — SODIUM CHLORIDE 0.9 % (FLUSH) 0.9 %
10 SYRINGE (ML) INJECTION EVERY 12 HOURS SCHEDULED
Status: DISCONTINUED | OUTPATIENT
Start: 2020-01-01 | End: 2020-01-01 | Stop reason: HOSPADM

## 2020-01-01 RX ORDER — NICOTINE POLACRILEX 4 MG
15 LOZENGE BUCCAL
Status: DISCONTINUED | OUTPATIENT
Start: 2020-01-01 | End: 2020-01-01 | Stop reason: HOSPADM

## 2020-01-01 RX ADMIN — INSULIN LISPRO 2 UNITS: 100 INJECTION, SOLUTION INTRAVENOUS; SUBCUTANEOUS at 17:21

## 2020-01-01 RX ADMIN — SODIUM CHLORIDE, PRESERVATIVE FREE 10 ML: 5 INJECTION INTRAVENOUS at 20:08

## 2020-01-01 RX ADMIN — ASPIRIN 81 MG: 81 TABLET, CHEWABLE ORAL at 08:46

## 2020-01-01 RX ADMIN — ASPIRIN 81 MG: 81 TABLET, CHEWABLE ORAL at 08:34

## 2020-01-01 RX ADMIN — ONDANSETRON 4 MG: 2 INJECTION INTRAMUSCULAR; INTRAVENOUS at 17:16

## 2020-01-01 RX ADMIN — NITROGLYCERIN 0.4 MG: 0.4 TABLET SUBLINGUAL at 04:42

## 2020-01-01 RX ADMIN — OLANZAPINE 10 MG: 10 TABLET, ORALLY DISINTEGRATING ORAL at 09:19

## 2020-01-01 RX ADMIN — IOPAMIDOL 150 ML: 612 INJECTION, SOLUTION INTRAVENOUS at 12:26

## 2020-01-01 RX ADMIN — INSULIN LISPRO 2 UNITS: 100 INJECTION, SOLUTION INTRAVENOUS; SUBCUTANEOUS at 08:55

## 2020-01-01 RX ADMIN — OLANZAPINE 10 MG: 10 TABLET, ORALLY DISINTEGRATING ORAL at 20:49

## 2020-01-01 RX ADMIN — LORAZEPAM 1 MG: 2 INJECTION INTRAMUSCULAR; INTRAVENOUS at 15:23

## 2020-01-01 RX ADMIN — ONDANSETRON 4 MG: 2 INJECTION INTRAMUSCULAR; INTRAVENOUS at 06:44

## 2020-01-01 RX ADMIN — OLANZAPINE 10 MG: 10 TABLET, ORALLY DISINTEGRATING ORAL at 22:44

## 2020-01-01 RX ADMIN — ACETAMINOPHEN 650 MG: 325 TABLET, FILM COATED ORAL at 08:40

## 2020-01-01 RX ADMIN — OLANZAPINE 5 MG: 10 INJECTION, POWDER, FOR SOLUTION INTRAMUSCULAR at 13:22

## 2020-01-01 RX ADMIN — ASPIRIN 81 MG: 81 TABLET, CHEWABLE ORAL at 08:22

## 2020-01-01 RX ADMIN — FOLIC ACID 1 MG: 1 TABLET ORAL at 09:17

## 2020-01-01 RX ADMIN — OLANZAPINE 10 MG: 10 TABLET, ORALLY DISINTEGRATING ORAL at 08:56

## 2020-01-01 RX ADMIN — INSULIN LISPRO 3 UNITS: 100 INJECTION, SOLUTION INTRAVENOUS; SUBCUTANEOUS at 17:47

## 2020-01-01 RX ADMIN — INSULIN LISPRO 2 UNITS: 100 INJECTION, SOLUTION INTRAVENOUS; SUBCUTANEOUS at 08:54

## 2020-01-01 RX ADMIN — NITROGLYCERIN 0.4 MG: 0.4 TABLET SUBLINGUAL at 04:47

## 2020-01-01 RX ADMIN — SODIUM CHLORIDE, PRESERVATIVE FREE 10 ML: 5 INJECTION INTRAVENOUS at 09:50

## 2020-01-01 RX ADMIN — SODIUM CHLORIDE, PRESERVATIVE FREE 10 ML: 5 INJECTION INTRAVENOUS at 09:00

## 2020-01-01 RX ADMIN — ONDANSETRON 4 MG: 2 INJECTION INTRAMUSCULAR; INTRAVENOUS at 04:02

## 2020-01-01 RX ADMIN — OLANZAPINE 10 MG: 10 TABLET, ORALLY DISINTEGRATING ORAL at 13:30

## 2020-01-01 RX ADMIN — SODIUM CHLORIDE, PRESERVATIVE FREE 10 ML: 5 INJECTION INTRAVENOUS at 20:05

## 2020-01-01 RX ADMIN — ACETAMINOPHEN 650 MG: 325 TABLET, FILM COATED ORAL at 22:21

## 2020-01-01 RX ADMIN — ASPIRIN 81 MG: 81 TABLET, CHEWABLE ORAL at 09:12

## 2020-01-01 RX ADMIN — ASPIRIN 81 MG: 81 TABLET, CHEWABLE ORAL at 09:17

## 2020-01-01 RX ADMIN — OLANZAPINE 10 MG: 10 TABLET, ORALLY DISINTEGRATING ORAL at 08:34

## 2020-01-01 RX ADMIN — OLANZAPINE 10 MG: 10 TABLET, ORALLY DISINTEGRATING ORAL at 20:08

## 2020-01-01 RX ADMIN — INSULIN LISPRO 0 UNITS: 100 INJECTION, SOLUTION INTRAVENOUS; SUBCUTANEOUS at 11:50

## 2020-01-01 RX ADMIN — INSULIN LISPRO 2 UNITS: 100 INJECTION, SOLUTION INTRAVENOUS; SUBCUTANEOUS at 09:15

## 2020-01-01 RX ADMIN — FOLIC ACID 1 MG: 1 TABLET ORAL at 13:50

## 2020-01-01 RX ADMIN — SODIUM CHLORIDE, PRESERVATIVE FREE 10 ML: 5 INJECTION INTRAVENOUS at 21:52

## 2020-01-01 RX ADMIN — SODIUM CHLORIDE, PRESERVATIVE FREE 10 ML: 5 INJECTION INTRAVENOUS at 09:15

## 2020-01-01 RX ADMIN — OLANZAPINE 10 MG: 10 TABLET, ORALLY DISINTEGRATING ORAL at 21:55

## 2020-01-01 RX ADMIN — CEFTRIAXONE SODIUM 1 G: 1 INJECTION, SOLUTION INTRAVENOUS at 22:02

## 2020-01-01 RX ADMIN — OLANZAPINE 5 MG: 10 INJECTION, POWDER, FOR SOLUTION INTRAMUSCULAR at 07:38

## 2020-01-01 RX ADMIN — SODIUM CHLORIDE, PRESERVATIVE FREE 10 ML: 5 INJECTION INTRAVENOUS at 08:46

## 2020-01-01 RX ADMIN — SODIUM CHLORIDE, PRESERVATIVE FREE 10 ML: 5 INJECTION INTRAVENOUS at 08:31

## 2020-01-01 RX ADMIN — OLANZAPINE 10 MG: 10 TABLET, ORALLY DISINTEGRATING ORAL at 20:25

## 2020-01-01 RX ADMIN — OLANZAPINE 10 MG: 10 TABLET, ORALLY DISINTEGRATING ORAL at 08:22

## 2020-01-01 RX ADMIN — INSULIN LISPRO 3 UNITS: 100 INJECTION, SOLUTION INTRAVENOUS; SUBCUTANEOUS at 09:50

## 2020-01-01 RX ADMIN — HYDROMORPHONE HYDROCHLORIDE 0.5 MG: 1 INJECTION, SOLUTION INTRAMUSCULAR; INTRAVENOUS; SUBCUTANEOUS at 08:38

## 2020-01-01 RX ADMIN — INSULIN LISPRO 3 UNITS: 100 INJECTION, SOLUTION INTRAVENOUS; SUBCUTANEOUS at 12:27

## 2020-01-01 RX ADMIN — LORAZEPAM 1 MG: 2 INJECTION INTRAMUSCULAR; INTRAVENOUS at 05:40

## 2020-01-01 RX ADMIN — SODIUM CHLORIDE, PRESERVATIVE FREE 10 ML: 5 INJECTION INTRAVENOUS at 08:34

## 2020-01-01 RX ADMIN — ALBUMIN HUMAN 12.5 G: 0.25 SOLUTION INTRAVENOUS at 17:00

## 2020-01-01 RX ADMIN — ACETAMINOPHEN 650 MG: 325 TABLET, FILM COATED ORAL at 01:49

## 2020-01-01 RX ADMIN — INSULIN LISPRO 2 UNITS: 100 INJECTION, SOLUTION INTRAVENOUS; SUBCUTANEOUS at 11:35

## 2020-01-01 RX ADMIN — SODIUM CHLORIDE, PRESERVATIVE FREE 10 ML: 5 INJECTION INTRAVENOUS at 08:22

## 2020-01-01 RX ADMIN — INSULIN LISPRO 3 UNITS: 100 INJECTION, SOLUTION INTRAVENOUS; SUBCUTANEOUS at 08:40

## 2020-01-01 RX ADMIN — MORPHINE SULFATE 2 MG: 2 INJECTION, SOLUTION INTRAMUSCULAR; INTRAVENOUS at 17:16

## 2020-01-01 RX ADMIN — SODIUM CHLORIDE, PRESERVATIVE FREE 10 ML: 5 INJECTION INTRAVENOUS at 22:02

## 2020-01-01 RX ADMIN — BISACODYL 10 MG: 10 SUPPOSITORY RECTAL at 09:12

## 2020-01-01 RX ADMIN — ACETAMINOPHEN 650 MG: 325 TABLET, FILM COATED ORAL at 20:28

## 2020-01-01 RX ADMIN — CEFTRIAXONE SODIUM 1 G: 1 INJECTION, SOLUTION INTRAVENOUS at 21:52

## 2020-01-01 RX ADMIN — INSULIN LISPRO 4 UNITS: 100 INJECTION, SOLUTION INTRAVENOUS; SUBCUTANEOUS at 17:07

## 2020-01-01 RX ADMIN — INSULIN LISPRO 2 UNITS: 100 INJECTION, SOLUTION INTRAVENOUS; SUBCUTANEOUS at 17:30

## 2020-01-01 RX ADMIN — SODIUM CHLORIDE, PRESERVATIVE FREE 10 ML: 5 INJECTION INTRAVENOUS at 08:57

## 2020-01-01 RX ADMIN — OLANZAPINE 20 MG: 10 TABLET, ORALLY DISINTEGRATING ORAL at 20:04

## 2020-01-01 RX ADMIN — MORPHINE SULFATE 2 MG: 2 INJECTION, SOLUTION INTRAMUSCULAR; INTRAVENOUS at 05:15

## 2020-01-01 RX ADMIN — EPOETIN ALFA-EPBX 20000 UNITS: 10000 INJECTION, SOLUTION INTRAVENOUS; SUBCUTANEOUS at 16:01

## 2020-01-01 RX ADMIN — LEVETIRACETAM 1000 MG: 10 INJECTION INTRAVENOUS at 13:47

## 2020-01-01 RX ADMIN — SODIUM CHLORIDE, PRESERVATIVE FREE 10 ML: 5 INJECTION INTRAVENOUS at 23:42

## 2020-01-01 RX ADMIN — HYDROMORPHONE HYDROCHLORIDE 0.5 MG: 1 INJECTION, SOLUTION INTRAMUSCULAR; INTRAVENOUS; SUBCUTANEOUS at 14:36

## 2020-01-01 RX ADMIN — SODIUM CHLORIDE, PRESERVATIVE FREE 10 ML: 5 INJECTION INTRAVENOUS at 22:04

## 2020-01-01 RX ADMIN — ACETAMINOPHEN 650 MG: 325 TABLET, FILM COATED ORAL at 14:45

## 2020-01-01 RX ADMIN — LORAZEPAM 0.5 MG: 2 INJECTION INTRAMUSCULAR; INTRAVENOUS at 14:36

## 2020-01-01 RX ADMIN — OLANZAPINE 10 MG: 10 TABLET, ORALLY DISINTEGRATING ORAL at 23:41

## 2020-01-01 RX ADMIN — OLANZAPINE 10 MG: 10 TABLET, ORALLY DISINTEGRATING ORAL at 09:12

## 2020-01-01 RX ADMIN — ONDANSETRON 4 MG: 2 INJECTION INTRAMUSCULAR; INTRAVENOUS at 18:57

## 2020-01-01 RX ADMIN — ACETAMINOPHEN 650 MG: 325 TABLET, FILM COATED ORAL at 04:29

## 2020-01-01 RX ADMIN — DEXTROSE AND SODIUM CHLORIDE 100 ML/HR: 5; 450 INJECTION, SOLUTION INTRAVENOUS at 21:52

## 2020-01-01 RX ADMIN — LORAZEPAM 0.5 MG: 2 INJECTION INTRAMUSCULAR; INTRAVENOUS at 21:00

## 2020-01-01 RX ADMIN — SODIUM CHLORIDE, PRESERVATIVE FREE 10 ML: 5 INJECTION INTRAVENOUS at 20:49

## 2020-01-01 RX ADMIN — INSULIN LISPRO 3 UNITS: 100 INJECTION, SOLUTION INTRAVENOUS; SUBCUTANEOUS at 08:46

## 2020-01-01 RX ADMIN — SODIUM CHLORIDE, PRESERVATIVE FREE 10 ML: 5 INJECTION INTRAVENOUS at 20:19

## 2020-01-01 RX ADMIN — INSULIN LISPRO 2 UNITS: 100 INJECTION, SOLUTION INTRAVENOUS; SUBCUTANEOUS at 12:16

## 2020-01-01 RX ADMIN — BISACODYL 10 MG: 10 SUPPOSITORY RECTAL at 16:01

## 2020-01-01 RX ADMIN — HYDROMORPHONE HYDROCHLORIDE 0.5 MG: 1 INJECTION, SOLUTION INTRAMUSCULAR; INTRAVENOUS; SUBCUTANEOUS at 20:32

## 2020-01-01 RX ADMIN — ASPIRIN 81 MG: 81 TABLET, CHEWABLE ORAL at 09:50

## 2020-01-01 RX ADMIN — SODIUM CHLORIDE, PRESERVATIVE FREE 10 ML: 5 INJECTION INTRAVENOUS at 08:56

## 2020-01-01 RX ADMIN — CEFTRIAXONE SODIUM 1 G: 1 INJECTION, SOLUTION INTRAVENOUS at 17:30

## 2020-01-01 RX ADMIN — SODIUM CHLORIDE, PRESERVATIVE FREE 10 ML: 5 INJECTION INTRAVENOUS at 20:25

## 2020-01-01 RX ADMIN — HYDROMORPHONE HYDROCHLORIDE 0.5 MG: 1 INJECTION, SOLUTION INTRAMUSCULAR; INTRAVENOUS; SUBCUTANEOUS at 21:00

## 2020-01-01 RX ADMIN — SODIUM CHLORIDE, PRESERVATIVE FREE 10 ML: 5 INJECTION INTRAVENOUS at 22:57

## 2020-01-01 RX ADMIN — NITROGLYCERIN 0.4 MG: 0.4 TABLET SUBLINGUAL at 04:37

## 2020-01-01 RX ADMIN — OLANZAPINE 10 MG: 10 TABLET, ORALLY DISINTEGRATING ORAL at 22:02

## 2020-01-01 RX ADMIN — SODIUM CHLORIDE, PRESERVATIVE FREE 10 ML: 5 INJECTION INTRAVENOUS at 09:45

## 2020-01-01 RX ADMIN — LORAZEPAM 0.5 MG: 2 INJECTION INTRAMUSCULAR; INTRAVENOUS at 20:32

## 2020-01-01 RX ADMIN — SODIUM CHLORIDE, PRESERVATIVE FREE 10 ML: 5 INJECTION INTRAVENOUS at 08:54

## 2020-01-01 RX ADMIN — SODIUM CHLORIDE, PRESERVATIVE FREE 10 ML: 5 INJECTION INTRAVENOUS at 09:59

## 2020-01-01 RX ADMIN — OLANZAPINE 10 MG: 10 TABLET, ORALLY DISINTEGRATING ORAL at 08:46

## 2020-01-01 RX ADMIN — LORAZEPAM 0.5 MG: 2 INJECTION INTRAMUSCULAR; INTRAVENOUS at 08:38

## 2020-01-01 RX ADMIN — OLANZAPINE 5 MG: 10 INJECTION, POWDER, FOR SOLUTION INTRAMUSCULAR at 22:43

## 2020-08-01 PROBLEM — E11.9 DIABETES MELLITUS (HCC): Status: ACTIVE | Noted: 2020-01-01

## 2020-08-01 PROBLEM — R77.8 ELEVATED TROPONIN: Status: ACTIVE | Noted: 2020-01-01

## 2020-08-01 PROBLEM — I10 HYPERTENSION: Status: ACTIVE | Noted: 2020-01-01

## 2020-08-01 PROBLEM — J44.9 COPD (CHRONIC OBSTRUCTIVE PULMONARY DISEASE) (HCC): Status: ACTIVE | Noted: 2020-01-01

## 2020-08-01 PROBLEM — N39.0 UTI (URINARY TRACT INFECTION): Status: ACTIVE | Noted: 2020-01-01

## 2020-08-01 PROBLEM — N18.9 ANEMIA DUE TO CHRONIC KIDNEY DISEASE: Status: ACTIVE | Noted: 2020-01-01

## 2020-08-01 PROBLEM — D63.1 ANEMIA DUE TO CHRONIC KIDNEY DISEASE: Status: ACTIVE | Noted: 2020-01-01

## 2020-08-01 PROBLEM — R41.89 UNRESPONSIVE: Status: ACTIVE | Noted: 2020-01-01

## 2020-08-01 PROBLEM — N18.6 ESRD (END STAGE RENAL DISEASE) (HCC): Status: ACTIVE | Noted: 2020-01-01

## 2020-08-01 PROBLEM — G93.41 ACUTE METABOLIC ENCEPHALOPATHY: Status: ACTIVE | Noted: 2020-01-01

## 2020-08-01 NOTE — ED NOTES
Pt wearing mask throughout encouter. This RN wearing mask and goggles throughout encounter.       Brynn Paez, RN  08/01/20 [id number]

## 2020-08-01 NOTE — ED NOTES
Pt presents to ED via EMS from dialysis center. Pt is a at [facility]. Pt was getting his dialysis treatment today, and finished his dose when staff said he quit responding to them. Upon EMS arrival pt was hypotensive, and will respond to painful stimuli. Facility gave him 1,000 ML of NS. EMS gave 100 ml of D-10, and two rounds of narcan. Pt is not verbal at this time, and facility reports he is typically A&Ox3.      Richard Willard, RN  08/01/20 [id number]

## 2020-08-01 NOTE — ED NOTES
Confirmed with [facility] dialysis center that pt's last known normal was 007:30 this morning     Shantell Mercer, RN  08/01/20 0079

## 2020-08-01 NOTE — PROGRESS NOTES
Clinical Pharmacy Services: Medication History    Lucien Avalos is a 75 y.o. male presenting to [facility] for   Chief Complaint   Patient presents with   • Altered Mental Status   • Hypotension       He  has a past medical history of Anxiety, CHF (congestive heart failure) (CMS/[facility]), Chronic pain, COPD (chronic obstructive pulmonary disease) (CMS/[facility]), Diabetes mellitus (CMS/[facility]), GERD (gastroesophageal reflux disease), Hyperlipidemia, Hypertension, Hypotension, Insomnia, Mood disorder (CMS/[facility]), Renal failure, and Respiratory failure (CMS/[facility]).    Allergies as of 08/01/2020   • (No Known Allergies)       Medication information was obtained from: nursing home  Pharmacy and Phone Number: [facility]    Prior to Admission Medications     Prescriptions Last Dose Informant Patient Reported? Taking?    albuterol sulfate  (90 Base) MCG/ACT inhaler  Nursing Home Yes Yes    Inhale 2 puffs Every 6 (Six) Hours As Needed for Wheezing.    atorvastatin (LIPITOR) 40 MG tablet  Nursing Home Yes Yes    Take 40 mg by mouth Daily.    clopidogrel (PLAVIX) 75 MG tablet  Nursing Home Yes Yes    Take 75 mg by mouth Daily.    FLUoxetine (PROzac) 20 MG capsule  Nursing Home Yes Yes    Take 20 mg by mouth Daily.    HYDROcodone-acetaminophen (NORCO) 5-325 MG per tablet  Nursing Home Yes Yes    Take 1 tablet by mouth Every 6 (Six) Hours As Needed.    hydrOXYzine (ATARAX) 50 MG tablet  Nursing Home Yes Yes    Take 50 mg by mouth Every 12 (Twelve) Hours.    insulin detemir (LEVEMIR) 100 UNIT/ML injection  Nursing Home Yes Yes    Inject 30 Units under the skin into the appropriate area as directed Daily.    ipratropium-albuterol (COMBIVENT RESPIMAT)  MCG/ACT inhaler  Nursing Home Yes Yes    Inhale 2 puffs Every 8 (Eight) Hours As Needed for Wheezing.    losartan (COZAAR) 25 MG tablet  Nursing Home Yes Yes    Take 25 mg by mouth Daily.    midodrine (PROAMATINE) 10 MG tablet  Nursing Home Yes Yes    Take 10 mg by  mouth Daily. Give before dialysis, on dialysis day (Mon, Wed, Fri)    Nutritional Supplements (PROMOD PO)  Nursing Home Yes Yes    Take 30 mL by mouth Daily.    pantoprazole (PROTONIX) 40 MG EC tablet  Nursing Home Yes Yes    Take 40 mg by mouth 2 (two) times a day.    sucralfate (CARAFATE) 1 g tablet  Nursing Home Yes Yes    Take 1 g by mouth 4 (Four) Times a Day.    Sucroferric Oxyhydroxide (Velphoro) 500 MG chewable tablet  Nursing Home Yes Yes    Chew 1 tablet 3 (Three) Times a Day.    temazepam (RESTORIL) 15 MG capsule  Nursing Home Yes Yes    Take 15 mg by mouth Every Night.            Medication notes: Adjusted midodrine dose. Changes made per nursing home papers    This medication list is complete to the best of my knowledge as of 8/1/2020    Please call if questions    Kristine Sanchez, Medication Reconciliation Technician #[id number]  8/1/2020 13:18

## 2020-08-01 NOTE — ED NOTES
"Pt stating \"I need to go to the bathroom\". Pt had BM. Pt cleansed and brief changed.      Samira Terrazas RN  08/01/20 8071    "

## 2020-08-01 NOTE — H&P
"Internal medicine history and physical  INTERNAL MEDICINE   [facility]       Patient Identification:  Name: Lucien Avalos  Age: 75 y.o.  Sex: male  :  1945  MRN: [id number]                   Primary Care Physician: Matilde Hi MD                               Date of admission:2020    Chief Complaint: Unresponsiveness noted at dialysis center after completion of dialysis with associated hypotension.    History of Present Illness:   Source information emergency room records and previous hospitalization records.  Patient appears to be more awake during my evaluation but either he has dementia or a poor personality as he is using racial slurs against the nurse and myself and very agitated and using threats such as\" he is going to kick my ass\".  This is dramatically different than him being completely unarousable and responsive in the emergency room.  Patient is a 75-year-old male who has history of end-stage renal disease on hemodialysis receives  dialysis was apparently in his usual state of his health when he was sent to the dialysis center from [facility].  Patient apparently is alert and oriented and ambulatory and conversant.  After dialysis patient became unresponsive and hypotensive and received a bolus of fluid and EMS was called.  Apparently in the past he has done the same thing and becomes very weak and confused and lethargic when too much fluid is removed during his dialysis treatment.  He has history of orthostatic hypotension and has been on midodrine.  Extensive work-up in the emergency room failed to reveal any acute process and he did not respond to Narcan as well as D10 administration.  We are asked to admit the patient to observe for recovery of his mental status and possible neurological evaluation.      Past Medical History:  Past Medical History:   Diagnosis Date   • Anxiety    • CHF (congestive heart failure) (CMS/[facility])  "   • Chronic pain    • COPD (chronic obstructive pulmonary disease) (CMS/[facility])    • Diabetes mellitus (CMS/[facility])    • GERD (gastroesophageal reflux disease)    • Hyperlipidemia    • Hypertension    • Hypotension    • Insomnia    • Mood disorder (CMS/[facility])    • Renal failure    • Respiratory failure (CMS/[facility])      Past Surgical History:  History reviewed. No pertinent surgical history.   Home Meds:  Medications Prior to Admission   Medication Sig Dispense Refill Last Dose   • albuterol sulfate  (90 Base) MCG/ACT inhaler Inhale 2 puffs Every 6 (Six) Hours As Needed for Wheezing.      • atorvastatin (LIPITOR) 40 MG tablet Take 40 mg by mouth Daily.      • clopidogrel (PLAVIX) 75 MG tablet Take 75 mg by mouth Daily.      • FLUoxetine (PROzac) 20 MG capsule Take 20 mg by mouth Daily.      • HYDROcodone-acetaminophen (NORCO) 5-325 MG per tablet Take 1 tablet by mouth Every 6 (Six) Hours As Needed.      • hydrOXYzine (ATARAX) 50 MG tablet Take 50 mg by mouth Every 12 (Twelve) Hours.      • insulin detemir (LEVEMIR) 100 UNIT/ML injection Inject 30 Units under the skin into the appropriate area as directed Daily.      • ipratropium-albuterol (COMBIVENT RESPIMAT)  MCG/ACT inhaler Inhale 2 puffs Every 8 (Eight) Hours As Needed for Wheezing.      • losartan (COZAAR) 25 MG tablet Take 25 mg by mouth Daily.      • midodrine (PROAMATINE) 10 MG tablet Take 10 mg by mouth Daily. Give before dialysis, on dialysis day (Mon, Wed, Fri)      • Nutritional Supplements (PROMOD PO) Take 30 mL by mouth Daily.      • pantoprazole (PROTONIX) 40 MG EC tablet Take 40 mg by mouth 2 (two) times a day.      • sucralfate (CARAFATE) 1 g tablet Take 1 g by mouth 4 (Four) Times a Day.      • Sucroferric Oxyhydroxide (Velphoro) 500 MG chewable tablet Chew 1 tablet 3 (Three) Times a Day.      • temazepam (RESTORIL) 15 MG capsule Take 15 mg by mouth Every Night.        Current Meds:     Current Facility-Administered Medications:   •  sodium  "chloride 0.9 % flush 10 mL, 10 mL, Intravenous, PRN, Shantanu Funes MD  Allergies:  No Known Allergies  Social History:   Social History     Tobacco Use   • Smoking status: Unknown If Ever Smoked   Substance Use Topics   • Alcohol use: Not on file      Family History:  History reviewed. No pertinent family history.       Review of Systems  See history of present illness and past medical history.  Could not be obtained from the patient because of reasons as mentioned above.      Vitals:   /63   Pulse 86   Temp 96.1 °F (35.6 °C) (Tympanic)   Resp 15   Ht 170.2 cm (67\")   Wt 71.7 kg (158 lb 1.6 oz)   SpO2 99%   BMI 24.76 kg/m²   I/O:     Intake/Output Summary (Last 24 hours) at 8/1/2020 1915  Last data filed at 8/1/2020 1832  Gross per 24 hour   Intake 50 ml   Output --   Net 50 ml     Exam: Was very limited.  Patient is examined using the personal protective equipment as per guidelines from infection control for this particular patient as enacted.  Hand washing was performed before and after patient interaction.  General Appearance:   Currently alert extremely agitated uncooperative and seem to have preserved language skills and appears to be using threatening statements and slurs purposefully.  I was told by nursing staff that they had a conversation with his wife and she told them that he has this type of personality.   Head:    Normocephalic, without obvious abnormality, atraumatic   Eyes:   Ocular movement seems to be intact his gaze are conjugate.   Ears:    Normal external ear canals, both ears   Nose:   Nares normal   Throat:  No obvious lesions of the lips noted oral examination could not be performed because of his uncooperation and threatening behavior   Neck:   Supple, symmetrical, trachea midline, no adenopathy;     thyroid:  no enlargement/tenderness/nodules; no carotid    bruit or JVD   Back:    Not examined   Lungs:    No obvious use of accessory muscles of breathing noted, auscultation not " performed as patient is threatening.   Chest Wall:    No tenderness or deformity    Heart:   Appears to have a regular rhythm on the monitor cardiac examination not performed   Abdomen:    Abdominal examination not performed but on inspection does not appear to have any acute process   Extremities:  Ecchymotic changes noted   Pulses:  Not been able to be palpated because of his behavior and threatening disposition   Skin:   Skin color normal, Skin is warm and dry,  no rashes or palpable lesions   Neurologic:  Grossly nonfocal, fully awake       Data Review:      I reviewed the patient's new clinical results.  Results from last 7 days   Lab Units 08/01/20  1214   WBC 10*3/mm3 7.82   HEMOGLOBIN g/dL 10.5*   PLATELETS 10*3/mm3 94*     Results from last 7 days   Lab Units 08/01/20  1214   SODIUM mmol/L 136   POTASSIUM mmol/L 4.0   CHLORIDE mmol/L 96*   CO2 mmol/L 28.0   BUN mg/dL 18   CREATININE mg/dL 4.06*   CALCIUM mg/dL 7.8*   GLUCOSE mg/dL 152*     Ct Angiogram Head    Result Date: 8/1/2020    1. No perfusion abnormality to suggest completed or threatened acute infarct. 2. No arterial cutoff. Narrowing at the bilateral ICA cavernous segments, estimated at 65% on the right, 75% on the left. 3. A 3 to 4 mm hyperdense focus at the mid brain remains indeterminate, could be a small focus of hemorrhage or vascular abnormality. Close follow-up/further evaluation recommended. 4. Possible mucosal lesion in the trachea versus lobulated mucus.   The findings were discussed by telephone with Dr. Sanchez at 1235, 1248, 08/01/2020  This report was finalized on 8/1/2020 12:56 PM by Dr. Jayjay Corbett M.D.      Ct Angiogram Neck    Result Date: 8/1/2020    1. No perfusion abnormality to suggest completed or threatened acute infarct. 2. No arterial cutoff. Narrowing at the bilateral ICA cavernous segments, estimated at 65% on the right, 75% on the left. 3. A 3 to 4 mm hyperdense focus at the mid brain remains indeterminate,  could be a small focus of hemorrhage or vascular abnormality. Close follow-up/further evaluation recommended. 4. Possible mucosal lesion in the trachea versus lobulated mucus.   The findings were discussed by telephone with Dr. Sanchez at 1235, 1248, 08/01/2020  This report was finalized on 8/1/2020 12:56 PM by Dr. Jayjay Corbett M.D.      Mri Brain Without Contrast    Result Date: 8/1/2020   No acute infarct. No findings to suggest hemorrhage at the midbrain, although the exam is somewhat limited by motion artifact; the CT density of concern on the earlier CT may represent vascular anomaly. Follow-up evaluation with enhanced MRI may be helpful when/if possible, and close follow-up CT could be obtained to ensure stability of the CT finding of concern.  This report was finalized on 8/1/2020 3:43 PM by Dr. Jayjay Corbett M.D.      Xr Chest 1 View    Result Date: 8/1/2020  Small likely atelectasis or scarring. Follow-up as clinically indicated.  This report was finalized on 8/1/2020 1:34 PM by Dr. Jayjay Corbett M.D.      Ct Cerebral Perfusion With & Without Contrast    Result Date: 8/1/2020    1. No perfusion abnormality to suggest completed or threatened acute infarct. 2. No arterial cutoff. Narrowing at the bilateral ICA cavernous segments, estimated at 65% on the right, 75% on the left. 3. A 3 to 4 mm hyperdense focus at the mid brain remains indeterminate, could be a small focus of hemorrhage or vascular abnormality. Close follow-up/further evaluation recommended. 4. Possible mucosal lesion in the trachea versus lobulated mucus.   The findings were discussed by telephone with Dr. Sanchez at 1235, 1248, 08/01/2020  This report was finalized on 8/1/2020 12:56 PM by Dr. Jayjay Corbett M.D.      ECG 12 Lead   Final Result   HEART RATE= 89  bpm   RR Interval= 672  ms   AK Interval= 192  ms   P Horizontal Axis=   deg   P Front Axis= 85  deg   QRSD Interval= 92  ms   QT Interval= 465  ms   QRS Axis= 14   deg   T Wave Axis= 140  deg   - ABNORMAL ECG -   Sinus rhythm   Atrial premature complex   Probable left atrial enlargement   Abnormal T, consider ischemia, lateral leads   Prolonged QT interval   NO PRIOR TRACING AVAILABLE FOR COMPARISON   Electronically Signed By: Ventura Turner ([facility]) ([facility]) 01-Aug-2020 18:42:51   Date and Time of Study: 2020-08-01 12:47:03            Assessment:  Active Hospital Problems    Diagnosis POA   • **Unresponsive [R41.89] Unknown   • Acute metabolic encephalopathy [G93.41] Yes   • COPD (chronic obstructive pulmonary disease) (CMS/[facility]) [J44.9] Yes   • Diabetes mellitus (CMS/[facility]) [E11.9] Yes   • Hypertension [I10] Unknown   • ESRD (end stage renal disease) (CMS/[facility]) [N18.6] Yes   • UTI (urinary tract infection) [N39.0] Unknown   • Elevated troponin [R79.89] Unknown   • Anemia due to chronic kidney disease [N18.9, D63.1] Unknown       Medical decision making:  Altered mental status manifesting as decreased level of responsiveness following dialysis with documented hypotension with similar episode of less severity occurred in the past after dialysis and associated hypotension.  This likely represent hypotensive encephalopathy as patient improved after IV fluids and restoration of his blood pressure.  Plan is to monitor for any evolving neurological status neurochecks and neurology consultation and speech evaluation.  Keeping him n.p.o. until speech evaluates the patient and provide him with seizure precautions.  Diabetes mellitus-continue with Accu-Cheks and sliding scale coverage and avoid hypoglycemic episodes.  Keep him n.p.o. until speech evaluates the patient and his mental status completely clears.  Significant agitation and disruptive behavior-provide him with as needed restraint to protect himself from self-injurious behavior and protect the caregiver around him.  Access evaluation is warranted.  Possible UTI-patient has received ceftriaxone in the emergency room follow-up on urine  culture.  Elevated troponin significance in this situation is unclear given his end-stage renal disease-monitor his troponin levels and consider cardiology evaluation if it is trending upward.  COPD-provide him with as needed nebulizer treatment and continuous pulse ox monitoring.  Anemia likely due to chronic disease plan is to monitor.  End-stage renal disease on hemodialysis-consulted nephrology.  Rainer Stevenson MD   8/1/2020  19:15  Much of this encounter note is an electronic transcription/translation of spoken language to printed text. The electronic translation of spoken language may permit erroneous, or at times, nonsensical words or phrases to be inadvertently transcribed; Although I have reviewed the note for such errors, some may still exist

## 2020-08-01 NOTE — ED PROVIDER NOTES
EMERGENCY DEPARTMENT ENCOUNTER    Room Number:  21/21  Date of encounter:  8/1/2020  PCP: Matilde Hi MD  Historian: [facility] report    Patient was placed in face mask during triage process. Patient was wearing facemask when I entered the room and throughout our encounter. I wore full protective equipment throughout this patient encounter including a face mask, eye protection, and gloves. Hand hygiene was performed before donning protective equipment and again following doffing of PPE after leaving the room.      HPI:  Chief Complaint: Altered mental status  A complete HPI/ROS/PMH/PSH/SH/FH are unobtainable due to: AMS  Context: Lucien Avalos is a 75 y.o. male who presents to the ED for evaluation of altered mental status that was noted at completion of his dialysis today.  Patient is apparently new to the care facility where he lives and to the dialysis center.  They noted that he was at least talking at the initiation of dialysis and felt like he was sleeping.  When he completed dialysis he attempted to wake him and he was unresponsive.  He reportedly received 1 L normal saline at the dialysis center following dialysis as well as an amp of D50 in route by EMS for glucose of 90.  Remainder of history unobtainable      MEDICAL HISTORY REVIEW  Unavailable    PAST MEDICAL HISTORY  Active Ambulatory Problems     Diagnosis Date Noted   • No Active Ambulatory Problems     Resolved Ambulatory Problems     Diagnosis Date Noted   • No Resolved Ambulatory Problems     Past Medical History:   Diagnosis Date   • Anxiety    • CHF (congestive heart failure) (CMS/Formerly [facility])    • Chronic pain    • COPD (chronic obstructive pulmonary disease) (CMS/Formerly [facility])    • Diabetes mellitus (CMS/Formerly [facility])    • GERD (gastroesophageal reflux disease)    • Hyperlipidemia    • Hypertension    • Hypotension    • Insomnia    • Mood disorder (CMS/Formerly [facility])    • Renal failure    • Respiratory failure (CMS/Formerly [facility])          PAST SURGICAL HISTORY  History  reviewed. No pertinent surgical history.      FAMILY HISTORY  History reviewed. No pertinent family history.      SOCIAL HISTORY  Social History     Socioeconomic History   • Marital status: Unknown     Spouse name: Not on file   • Number of children: Not on file   • Years of education: Not on file   • Highest education level: Not on file   Tobacco Use   • Smoking status: Unknown If Ever Smoked         ALLERGIES  Patient has no known allergies.        REVIEW OF SYSTEMS  Review of Systems     All systems reviewed and negative except for those discussed in HPI.       PHYSICAL EXAM    I have reviewed the triage vital signs and nursing notes.    ED Triage Vitals [08/01/20 1206]   Temp Heart Rate Resp BP SpO2   -- 82 15 140/82 99 %      Temp src Heart Rate Source Patient Position BP Location FiO2 (%)   -- Monitor Sitting Right arm --       Physical Exam    Physical Exam   Constitutional: No distress.  Nonverbal, does not respond to verbal or tactile stimuli.  Does not follow exam requests.  HENT:  Head: Normocephalic and atraumatic.   Oropharynx: Mucous membranes are moist.   Eyes: No scleral icterus. No conjunctival pallor.  Pupils equal round reactive 3 to 4 mm.  Neck:  Neck supple.   Cardiovascular: Normal rate, regular rhythm and intact distal pulses.  Pulmonary/Chest: No respiratory distress.  Diminished breath sounds bilaterally.  Mild rhonchi bibasilar.    Abdominal: Soft. There is no tenderness. There is no rebound and no guarding.   Musculoskeletal: Withdrawals weakly from painful stimuli in all 4 extremities but seems to be slightly weaker on the right than the left.  Right upper extremity dialysis catheter with clamp in place.  neurological: Currently nonverbal and nonresponsive.  Withdraws weakly from painful stimuli in all 4 extremities though left seems stronger than right.  Skin: Skin is pink, warm, and dry. No pallor.   Psychiatric: Unable to assess  Nursing note and vitals reviewed.    LAB  RESULTS  Recent Results (from the past 24 hour(s))   POC Glucose Once    Collection Time: 08/01/20 12:13 PM   Result Value Ref Range    Glucose 134 (H) 70 - 130 mg/dL   Comprehensive Metabolic Panel    Collection Time: 08/01/20 12:14 PM   Result Value Ref Range    Glucose 152 (H) 65 - 99 mg/dL    BUN 18 8 - 23 mg/dL    Creatinine 4.06 (H) 0.76 - 1.27 mg/dL    Sodium 136 136 - 145 mmol/L    Potassium 4.0 3.5 - 5.2 mmol/L    Chloride 96 (L) 98 - 107 mmol/L    CO2 28.0 22.0 - 29.0 mmol/L    Calcium 7.8 (L) 8.6 - 10.5 mg/dL    Total Protein 6.7 6.0 - 8.5 g/dL    Albumin 3.20 (L) 3.50 - 5.20 g/dL    ALT (SGPT) 15 1 - 41 U/L    AST (SGOT) 19 1 - 40 U/L    Alkaline Phosphatase 66 39 - 117 U/L    Total Bilirubin 1.0 0.0 - 1.2 mg/dL    eGFR Non African Amer 15 (L) >60 mL/min/1.73    Globulin 3.5 gm/dL    A/G Ratio 0.9 g/dL    BUN/Creatinine Ratio 4.4 (L) 7.0 - 25.0    Anion Gap 12.0 5.0 - 15.0 mmol/L   Protime-INR    Collection Time: 08/01/20 12:14 PM   Result Value Ref Range    Protime 13.9 11.7 - 14.2 Seconds    INR 1.08 0.90 - 1.10   aPTT    Collection Time: 08/01/20 12:14 PM   Result Value Ref Range    PTT 25.0 22.7 - 35.4 seconds   Troponin    Collection Time: 08/01/20 12:14 PM   Result Value Ref Range    Troponin T 0.153 (C) 0.000 - 0.030 ng/mL   Type & Screen    Collection Time: 08/01/20 12:14 PM   Result Value Ref Range    ABO Type O     RH type Positive     Antibody Screen Negative     T&S Expiration Date 8/4/2020 11:59:59 PM    Light Blue Top    Collection Time: 08/01/20 12:14 PM   Result Value Ref Range    Extra Tube hold for add-on    Green Top (Gel)    Collection Time: 08/01/20 12:14 PM   Result Value Ref Range    Extra Tube Hold for add-ons.    Lavender Top    Collection Time: 08/01/20 12:14 PM   Result Value Ref Range    Extra Tube hold for add-on    Gold Top - SST    Collection Time: 08/01/20 12:14 PM   Result Value Ref Range    Extra Tube Hold for add-ons.    CBC Auto Differential    Collection Time:  08/01/20 12:14 PM   Result Value Ref Range    WBC 7.82 3.40 - 10.80 10*3/mm3    RBC 3.60 (L) 4.14 - 5.80 10*6/mm3    Hemoglobin 10.5 (L) 13.0 - 17.7 g/dL    Hematocrit 33.9 (L) 37.5 - 51.0 %    MCV 94.2 79.0 - 97.0 fL    MCH 29.2 26.6 - 33.0 pg    MCHC 31.0 (L) 31.5 - 35.7 g/dL    RDW 15.6 (H) 12.3 - 15.4 %    RDW-SD 54.6 (H) 37.0 - 54.0 fl    MPV 12.0 6.0 - 12.0 fL    Platelets 94 (L) 140 - 450 10*3/mm3    Neutrophil % 77.4 (H) 42.7 - 76.0 %    Lymphocyte % 6.8 (L) 19.6 - 45.3 %    Monocyte % 12.7 (H) 5.0 - 12.0 %    Eosinophil % 0.9 0.3 - 6.2 %    Basophil % 0.9 0.0 - 1.5 %    Immature Grans % 1.3 (H) 0.0 - 0.5 %    Neutrophils, Absolute 6.06 1.70 - 7.00 10*3/mm3    Lymphocytes, Absolute 0.53 (L) 0.70 - 3.10 10*3/mm3    Monocytes, Absolute 0.99 (H) 0.10 - 0.90 10*3/mm3    Eosinophils, Absolute 0.07 0.00 - 0.40 10*3/mm3    Basophils, Absolute 0.07 0.00 - 0.20 10*3/mm3    Immature Grans, Absolute 0.10 (H) 0.00 - 0.05 10*3/mm3    nRBC 0.0 0.0 - 0.2 /100 WBC   Magnesium    Collection Time: 08/01/20 12:14 PM   Result Value Ref Range    Magnesium 1.8 1.6 - 2.4 mg/dL   Acetaminophen Level    Collection Time: 08/01/20 12:14 PM   Result Value Ref Range    Acetaminophen <5.0 (L) 10.0 - 30.0 mcg/mL   Ethanol    Collection Time: 08/01/20 12:14 PM   Result Value Ref Range    Ethanol <10 0 - 10 mg/dL    Ethanol % <0.010 %   Salicylate Level    Collection Time: 08/01/20 12:14 PM   Result Value Ref Range    Salicylate <0.3 <=30.0 mg/dL   Ammonia    Collection Time: 08/01/20  1:07 PM   Result Value Ref Range    Ammonia 64 (H) 16 - 60 umol/L   Respiratory Panel PCR w/COVID-19(SARS-CoV-2) [facility] In-House, NP Swab in [facility], 8-12 Hr TAT - Swab, Nasopharynx    Collection Time: 08/01/20  1:50 PM   Result Value Ref Range    ADENOVIRUS, PCR Not Detected Not Detected    Coronavirus 229E Not Detected Not Detected    Coronavirus HKU1 Not Detected Not Detected    Coronavirus NL63 Not Detected Not Detected    Coronavirus OC43 Not  Detected Not Detected    COVID19 Not Detected Not Detected - Ref. Range    Human Metapneumovirus Not Detected Not Detected    Human Rhinovirus/Enterovirus Not Detected Not Detected    Influenza A PCR Not Detected Not Detected    Influenza A H1 Not Detected Not Detected    Influenza A H1 2009 PCR Not Detected Not Detected    Influenza A H3 Not Detected Not Detected    Influenza B PCR Not Detected Not Detected    Parainfluenza Virus 1 Not Detected Not Detected    Parainfluenza Virus 2 Not Detected Not Detected    Parainfluenza Virus 3 Not Detected Not Detected    Parainfluenza Virus 4 Not Detected Not Detected    RSV, PCR Not Detected Not Detected    Bordetella pertussis pcr Not Detected Not Detected    Bordetella parapertussis PCR Not Detected Not Detected    Chlamydophila pneumoniae PCR Not Detected Not Detected    Mycoplasma pneumo by PCR Not Detected Not Detected   Urine Drug Screen - Urine, Catheter    Collection Time: 08/01/20  2:01 PM   Result Value Ref Range    Amphet/Methamphet, Screen Negative Negative    Barbiturates Screen, Urine Negative Negative    Benzodiazepine Screen, Urine Negative Negative    Cocaine Screen, Urine Negative Negative    Opiate Screen Negative Negative    THC, Screen, Urine Negative Negative    Methadone Screen, Urine Negative Negative    Oxycodone Screen, Urine Negative Negative   Urinalysis With Microscopic If Indicated (No Culture) - Urine, Catheter    Collection Time: 08/01/20  2:01 PM   Result Value Ref Range    Color, UA Yellow Yellow, Straw    Appearance, UA Clear Clear    pH, UA 8.0 5.0 - 8.0    Specific Gravity, UA 1.015 1.005 - 1.030    Glucose,  mg/dL (1+) (A) Negative    Ketones, UA Negative Negative    Bilirubin, UA Negative Negative    Blood, UA Small (1+) (A) Negative    Protein, UA >=300 mg/dL (3+) (A) Negative    Leuk Esterase, UA Moderate (2+) (A) Negative    Nitrite, UA Negative Negative    Urobilinogen, UA 0.2 E.U./dL 0.2 - 1.0 E.U./dL   Urinalysis,  Microscopic Only - Urine, Catheter    Collection Time: 08/01/20  2:01 PM   Result Value Ref Range    RBC, UA 6-12 (A) None Seen, 0-2 /HPF    WBC, UA 13-20 (A) None Seen, 0-2 /HPF    Bacteria, UA None Seen None Seen /HPF    Squamous Epithelial Cells, UA 3-6 (A) None Seen, 0-2 /HPF    Hyaline Casts, UA None Seen None Seen /LPF    Methodology Manual Light Microscopy        Ordered the above labs and independently reviewed the results.        RADIOLOGY  Ct Angiogram Head    Result Date: 8/1/2020  CT ANGIOGRAM HEAD AND NECK AND CT PERFUSION STUDY  CLINICAL HISTORY: Altered mental status.  Radiation dose reduction techniques were utilized, including automated exposure control and exposure modulation based on body size. CT scan of the head was obtained with 3 mm axial images without the use of IV contrast. The patient underwent a CT perfusion study with a dynamic bolus of IV contrast. Standard perfusion maps were constructed. The patient then underwent a CT angiogram of the head and neck with 1 mm axial images following the administration of IV contrast. Sagittal, coronal, and 3-dimensional reconstructed images were obtained.  Percent stenosis was assessed in accordance with NASCET criteria.  FINDINGS:  NONCONTRAST HEAD CT: On the noncontrast head CT, a 3-4 mm hyperdense focus at the midbrain, for example axial image 17 could be a small focus of blood or vascular anomaly, further evaluation with MRI may be helpful, close interval follow-up can characterize change. Moderate periventricular hypodensities suggest chronic small vessel ischemic change in a patient this age. Arterial calcifications are seen at the base the brain.  No enhancing lesions of brain are noted following contrast material administration.   CT PERFUSION STUDY:  No CBF (under 30%) deficit or perfusion abnormality is demonstrated where the T MAX is greater than 6 seconds. The calculated mismatch volume was 0 cc.  CTA HEAD and neck: The CT angiogram of the  head and neck demonstrates calcifications in the bilateral cervical and intracranial carotid arteries, with estimated 65% narrowing at the cavernous segment of the right ICA, 75% narrowing at the origin of the cavernous segment of the left ICA otherwise without high-grade stenosis (0-49% stenosis). About 20% narrowing at the origin of the left cervical ICA. Otherwise, no hemodynamically significant focal stenosis, aneurysm, or dissection in the cervical carotid or vertebral arteries, or in the arteries at the base of the brain. The left P1 segment is diminutive, the left PCA being at least partially supplied by the anterior circulation. The lower aspect of the left vertebral artery is obscured by attenuation artifact from densely opacified left subclavian vein, precluding its assessment.  Lobulated mucous versus mucosal lesion measuring 1.7 cm in the posterior right aspect of the trachea at the level of the thoracic inlet, axial image 65, follow-up or direct visualization recommended.  Cervical spondyloarthropathy is present with uncovertebral joint and facet hypertrophy result in bilateral neuroforaminal narrowing, more prominent on the right at C4/5, C5/6, and on the left at C3/4, C5/6.          1. No perfusion abnormality to suggest completed or threatened acute infarct. 2. No arterial cutoff. Narrowing at the bilateral ICA cavernous segments, estimated at 65% on the right, 75% on the left. 3. A 3 to 4 mm hyperdense focus at the mid brain remains indeterminate, could be a small focus of hemorrhage or vascular abnormality. Close follow-up/further evaluation recommended. 4. Possible mucosal lesion in the trachea versus lobulated mucus.   The findings were discussed by telephone with Dr. Sanchez at 1235, 1248, 08/01/2020  This report was finalized on 8/1/2020 12:56 PM by Dr. Jayjay Corbett M.D.      Ct Angiogram Neck    Result Date: 8/1/2020  CT ANGIOGRAM HEAD AND NECK AND CT PERFUSION STUDY  CLINICAL HISTORY:  Altered mental status.  Radiation dose reduction techniques were utilized, including automated exposure control and exposure modulation based on body size. CT scan of the head was obtained with 3 mm axial images without the use of IV contrast. The patient underwent a CT perfusion study with a dynamic bolus of IV contrast. Standard perfusion maps were constructed. The patient then underwent a CT angiogram of the head and neck with 1 mm axial images following the administration of IV contrast. Sagittal, coronal, and 3-dimensional reconstructed images were obtained.  Percent stenosis was assessed in accordance with NASCET criteria.  FINDINGS:  NONCONTRAST HEAD CT: On the noncontrast head CT, a 3-4 mm hyperdense focus at the midbrain, for example axial image 17 could be a small focus of blood or vascular anomaly, further evaluation with MRI may be helpful, close interval follow-up can characterize change. Moderate periventricular hypodensities suggest chronic small vessel ischemic change in a patient this age. Arterial calcifications are seen at the base the brain.  No enhancing lesions of brain are noted following contrast material administration.   CT PERFUSION STUDY:  No CBF (under 30%) deficit or perfusion abnormality is demonstrated where the T MAX is greater than 6 seconds. The calculated mismatch volume was 0 cc.  CTA HEAD and neck: The CT angiogram of the head and neck demonstrates calcifications in the bilateral cervical and intracranial carotid arteries, with estimated 65% narrowing at the cavernous segment of the right ICA, 75% narrowing at the origin of the cavernous segment of the left ICA otherwise without high-grade stenosis (0-49% stenosis). About 20% narrowing at the origin of the left cervical ICA. Otherwise, no hemodynamically significant focal stenosis, aneurysm, or dissection in the cervical carotid or vertebral arteries, or in the arteries at the base of the brain. The left P1 segment is  diminutive, the left PCA being at least partially supplied by the anterior circulation. The lower aspect of the left vertebral artery is obscured by attenuation artifact from densely opacified left subclavian vein, precluding its assessment.  Lobulated mucous versus mucosal lesion measuring 1.7 cm in the posterior right aspect of the trachea at the level of the thoracic inlet, axial image 65, follow-up or direct visualization recommended.  Cervical spondyloarthropathy is present with uncovertebral joint and facet hypertrophy result in bilateral neuroforaminal narrowing, more prominent on the right at C4/5, C5/6, and on the left at C3/4, C5/6.          1. No perfusion abnormality to suggest completed or threatened acute infarct. 2. No arterial cutoff. Narrowing at the bilateral ICA cavernous segments, estimated at 65% on the right, 75% on the left. 3. A 3 to 4 mm hyperdense focus at the mid brain remains indeterminate, could be a small focus of hemorrhage or vascular abnormality. Close follow-up/further evaluation recommended. 4. Possible mucosal lesion in the trachea versus lobulated mucus.   The findings were discussed by telephone with Dr. Sanchez at 1235, 1248, 08/01/2020  This report was finalized on 8/1/2020 12:56 PM by Dr. Jayjay Corbett M.D.      Mri Brain Without Contrast    Result Date: 8/1/2020  MRI BRAIN WO CONTRAST-  INDICATIONS: Altered mental status, possible hemorrhage  TECHNIQUE: Multiplanar multisequence noncontrast magnetic resonance imaging of the brain.  COMPARISON: CT from 08/01/2020  FINDINGS:  Several sequences are significantly degraded by motion artifact, limiting the assessment.  The diffusion-weighted images show no restricted diffusion to suggest acute infarct.  The midline structures appear unremarkable.  The brain parenchyma shows moderate periventricular white matter T2 FLAIR signal hyperintensities suggesting chronic small vessel ischemic change in a patient this age.  Flow voids  in the major arteries at the base of the brain appear unremarkable. Left vertebral artery is dominant.  The paranasal sinuses, mastoid air cells, and orbits appear unremarkable.       No acute infarct. No findings to suggest hemorrhage at the midbrain, although the exam is somewhat limited by motion artifact; the CT density of concern on the earlier CT may represent vascular anomaly. Follow-up evaluation with enhanced MRI may be helpful when/if possible, and close follow-up CT could be obtained to ensure stability of the CT finding of concern.  This report was finalized on 8/1/2020 3:43 PM by Dr. Jayjay Corbett M.D.      Xr Chest 1 View    Result Date: 8/1/2020  XR CHEST 1 VW-  HISTORY: Male who is 75 years-old,  stroke  TECHNIQUE: Frontal view of the chest  COMPARISON: None available  FINDINGS: Heart is mildly enlarged. Mild prominence of vascular and interstitial markings. Linear likely atelectasis or scarring the left midlung. No pleural effusion or pneumothorax. No acute osseous process.      Small likely atelectasis or scarring. Follow-up as clinically indicated.  This report was finalized on 8/1/2020 1:34 PM by Dr. Jayjay Corbett M.D.      Ct Cerebral Perfusion With & Without Contrast    Result Date: 8/1/2020  CT ANGIOGRAM HEAD AND NECK AND CT PERFUSION STUDY  CLINICAL HISTORY: Altered mental status.  Radiation dose reduction techniques were utilized, including automated exposure control and exposure modulation based on body size. CT scan of the head was obtained with 3 mm axial images without the use of IV contrast. The patient underwent a CT perfusion study with a dynamic bolus of IV contrast. Standard perfusion maps were constructed. The patient then underwent a CT angiogram of the head and neck with 1 mm axial images following the administration of IV contrast. Sagittal, coronal, and 3-dimensional reconstructed images were obtained.  Percent stenosis was assessed in accordance with NASCET criteria.   FINDINGS:  NONCONTRAST HEAD CT: On the noncontrast head CT, a 3-4 mm hyperdense focus at the midbrain, for example axial image 17 could be a small focus of blood or vascular anomaly, further evaluation with MRI may be helpful, close interval follow-up can characterize change. Moderate periventricular hypodensities suggest chronic small vessel ischemic change in a patient this age. Arterial calcifications are seen at the base the brain.  No enhancing lesions of brain are noted following contrast material administration.   CT PERFUSION STUDY:  No CBF (under 30%) deficit or perfusion abnormality is demonstrated where the T MAX is greater than 6 seconds. The calculated mismatch volume was 0 cc.  CTA HEAD and neck: The CT angiogram of the head and neck demonstrates calcifications in the bilateral cervical and intracranial carotid arteries, with estimated 65% narrowing at the cavernous segment of the right ICA, 75% narrowing at the origin of the cavernous segment of the left ICA otherwise without high-grade stenosis (0-49% stenosis). About 20% narrowing at the origin of the left cervical ICA. Otherwise, no hemodynamically significant focal stenosis, aneurysm, or dissection in the cervical carotid or vertebral arteries, or in the arteries at the base of the brain. The left P1 segment is diminutive, the left PCA being at least partially supplied by the anterior circulation. The lower aspect of the left vertebral artery is obscured by attenuation artifact from densely opacified left subclavian vein, precluding its assessment.  Lobulated mucous versus mucosal lesion measuring 1.7 cm in the posterior right aspect of the trachea at the level of the thoracic inlet, axial image 65, follow-up or direct visualization recommended.  Cervical spondyloarthropathy is present with uncovertebral joint and facet hypertrophy result in bilateral neuroforaminal narrowing, more prominent on the right at C4/5, C5/6, and on the left at C3/4,  C5/6.          1. No perfusion abnormality to suggest completed or threatened acute infarct. 2. No arterial cutoff. Narrowing at the bilateral ICA cavernous segments, estimated at 65% on the right, 75% on the left. 3. A 3 to 4 mm hyperdense focus at the mid brain remains indeterminate, could be a small focus of hemorrhage or vascular abnormality. Close follow-up/further evaluation recommended. 4. Possible mucosal lesion in the trachea versus lobulated mucus.   The findings were discussed by telephone with Dr. Sanchez at 1235, 1248, 08/01/2020  This report was finalized on 8/1/2020 12:56 PM by Dr. Jayjay Corbett M.D.        I ordered the above noted radiological studies. Reviewed by me and discussed with radiologist.  See dictation for official radiology interpretation.      PROCEDURES    Procedures        MEDICATIONS GIVEN IN ER    Medications   sodium chloride 0.9 % flush 10 mL (has no administration in time range)   cefTRIAXone (ROCEPHIN) IVPB 1 g (has no administration in time range)   iopamidol (ISOVUE-300) 61 % injection 150 mL (150 mL Intravenous Given by Other 8/1/20 1226)   levETIRAcetam in NaCl 0.75% (KEPPRA) IVPB 1,000 mg (0 mg Intravenous Stopped 8/1/20 1401)         PROGRESS, DATA ANALYSIS, CONSULTS, AND MEDICAL DECISION MAKING    My differential diagnosis for altered mental status includes but is not limited to:  Hypoglycemia, hyperglycemia, DKA, overdose, ethanol intoxication, thiamine deficiency, niacin deficiency, hypothymia, hyperviscosity, Jovan’s disease, hyponatremia, hypernatremia, liver failure, kidney failure, hyper or hypothyroid, no insufficiency, hypoxia, hypercarbia, carbon monoxide poisoning, postanoxic encephalopathy, ischemic stroke, intracranial bleed, subarachnoid hemorrhage, brain tumor, closed head injury, epidural hematoma, epidural hematoma, seizure activity, postictal state, syncopal episode, disseminated encephalomyelitis, central pontine myelinolysis, post cardiac arrest,  bacterial meningitis, viral meningitis, fungal meningitis, encephalitis, brain abscess, subdural empyema, hysteria, catatonic state, malingering, hypertensive encephalopathy, vasculitis, TTP, DIC    Total critical care time: Approximately 35 minutes    Due to a high probability of clinically significant, life threatening deterioration, the patient required my highest level of preparedness to intervene emergently and I personally spent this critical care time directly and personally managing the patient. This critical care time included obtaining a history; examining the patient; vital sign monitoring; ordering and review of studies; arranging urgent treatment with development of a management plan; evaluation of patient's response to treatment; frequent reassessment; and, discussions with other providers.    This critical care time was performed to assess and manage the high probability of imminent, life-threatening deterioration that could result in multi-organ failure. It was exclusive of separately billable procedures and treating other patients and teaching time.    Please see MDM section and the rest of the note for further information on patient assessment and treatment.      All labs have been independently reviewed by me.  All radiology studies have been reviewed by me and discussed with radiologist dictating the report.   EKG's independently viewed and interpreted by me.  Discussion below represents my analysis of pertinent findings related to patient's condition, differential diagnosis, treatment plan and final disposition.      ED Course as of Aug 01 1706   Sat Aug 01, 2020   1218 Team D called.  Limited history of this case was discussed with stroke neurologist, Dr. Avelar.  Move forward with CTA head neck and perfusion.    [RS]   1218 Per nursing home report last known normal was 0610 when he left the nursing facility to go to dialysis.  Baseline is reportedly alert and oriented with walking and  talking.    [RS]   1240 Dialysis nursing report-last known normal 0730    [RS]   1249 EKG           EKG time: 1247  Rhythm/Rate: Sinus, 90  P waves and [place]: TYRONE within normal limits with prominent P wave  QRS, axis: Narrow QRS complex with slow R wave progression  ST and T waves: No STEMI; lateral T wave inversions with mildly prolonged QT    Interpreted Contemporaneously by me, independently viewed  Comparison: Unavailable      [RS]   1250 Critical troponin noted in this nonresponsive patient who cannot give me any history who is also a dialysis patient with likely chronically elevated creatinine.  No STEMI on EKG.    [RS]   1318 T result reviewed with stroke neurology Dr. GRANDE.  Recommend stat MRI and Keppra IV.    [RS]   1325 Long discussion about the patient's current status, concerns about his future as well as his recent statements.  She notes the patient had tried to give up dialysis, has frequently expressed being tired and desiring not to continue further life-saving measures.  She is in agreement with the MRI today but would like to make him DNR/DNI.  She does request call back with MRI results decide whether she would like for him to go direct to palliative care versus further medical treatment depending on diagnosis.    [RS]   1421 Patient did reportedly have a verbal interaction with the nurses during catheterization.  It was brief and he has not set anything since.    [RS]   1559 MRI result reviewed with stroke neurology.  Recommends 24-hour observation to see if he wakes for possible seizure.  Patient sister updated with laboratory, radiologic and MRI results.  She is agreeable plan for admission to the regular hospital to evaluate for possibility of improvement and then can decide about palliative care thereafter.    [RS]   1640 Patient reviewed with Dr. Stevenson, [facility] is agreeable except the patient for admission.    [RS]   1706 Ceftriaxone added for abnormal urine.  Urine culture ordered.    [RS]      ED  Course User Index  [RS] Shantanu Funes MD       AS OF 17:06 VITALS:    BP - 139/79  HR - 86  TEMP - 96.1 °F (35.6 °C) (Tympanic)  O2 SATS - 98%        DIAGNOSIS  Final diagnoses:   Acute metabolic encephalopathy   ESRD (end stage renal disease) on dialysis (CMS/[facility])   Elevated troponin   Acute UTI         DISPOSITION  ADMISSION    Discussed treatment plan and reason for admission with pt/family and admitting physician.  Pt/family voiced understanding of the plan for admission for further testing/treatment as needed.            Shantanu Funes MD  08/01/20 1706       Shantanu Funes MD  08/01/20 8374

## 2020-08-02 NOTE — CONSULTS
Patient Identification:  NAME:  Lucien Avalos  Age:  75 y.o.   Sex:  male   :  1945   MRN:  [id number]       Chief complaint: He does not have one, reason for consult unresponsiveness followed by extreme agitation    History of present illness: Patient is a 75-year-old right-handed white male with a history of hypertension diabetes GERD and end-stage renal disease he has had dialysis following this he had a syncopal episode associated symptoms he had low blood pressure.  Since then he has returned to normal has however been very agitated at times he does not appear to be hallucinating but just is mean and cursing at people using racial slurs etc..  Modifying factors so far nothing has worked to stop this behavior.  Context patient who does take Restoril and Norco at home.  Locations not pertinent duration is been over the last 24 hours  PPE used at all time I had a mask on gloves goggles down and I had him wear a mask as well.  No aerosolization sat was not within 6 feet of him for longer than 10 minutes during the exam    Past medical history:  Past Medical History:   Diagnosis Date   • Anxiety    • CHF (congestive heart failure) (CMS/[facility])    • Chronic pain    • COPD (chronic obstructive pulmonary disease) (CMS/[facility])    • Diabetes mellitus (CMS/[facility])    • GERD (gastroesophageal reflux disease)    • Hyperlipidemia    • Hypertension    • Hypotension    • Insomnia    • Mood disorder (CMS/[facility])    • Renal failure    • Respiratory failure (CMS/[facility])        Past surgical history:  History reviewed. No pertinent surgical history.    Allergies:  Patient has no known allergies.    Home medications:  Medications Prior to Admission   Medication Sig Dispense Refill Last Dose   • albuterol sulfate  (90 Base) MCG/ACT inhaler Inhale 2 puffs Every 6 (Six) Hours As Needed for Wheezing.      • atorvastatin (LIPITOR) 40 MG tablet Take 40 mg by mouth Daily.      • clopidogrel (PLAVIX) 75 MG tablet Take 75 mg by mouth  Daily.      • FLUoxetine (PROzac) 20 MG capsule Take 20 mg by mouth Daily.      • HYDROcodone-acetaminophen (NORCO) 5-325 MG per tablet Take 1 tablet by mouth Every 6 (Six) Hours As Needed.      • hydrOXYzine (ATARAX) 50 MG tablet Take 50 mg by mouth Every 12 (Twelve) Hours.      • insulin detemir (LEVEMIR) 100 UNIT/ML injection Inject 30 Units under the skin into the appropriate area as directed Daily.      • ipratropium-albuterol (COMBIVENT RESPIMAT)  MCG/ACT inhaler Inhale 2 puffs Every 8 (Eight) Hours As Needed for Wheezing.      • losartan (COZAAR) 25 MG tablet Take 25 mg by mouth Daily.      • midodrine (PROAMATINE) 10 MG tablet Take 10 mg by mouth Daily. Give before dialysis, on dialysis day (Mon, Wed, Fri)      • Nutritional Supplements (PROMOD PO) Take 30 mL by mouth Daily.      • pantoprazole (PROTONIX) 40 MG EC tablet Take 40 mg by mouth 2 (two) times a day.      • sucralfate (CARAFATE) 1 g tablet Take 1 g by mouth 4 (Four) Times a Day.      • Sucroferric Oxyhydroxide (Velphoro) 500 MG chewable tablet Chew 1 tablet 3 (Three) Times a Day.      • temazepam (RESTORIL) 15 MG capsule Take 15 mg by mouth Every Night.           Hospital medications:    cefTRIAXone 1 g Intravenous Q24H   sodium chloride 10 mL Intravenous Q12H       dextrose 5 % and sodium chloride 0.45 % 100 mL/hr Last Rate: 100 mL/hr (08/01/20 3322)     •  acetaminophen **OR** acetaminophen **OR** acetaminophen  •  nitroglycerin  •  ondansetron  •  sodium chloride  •  sodium chloride    Family history:  History reviewed. No pertinent family history.    Social history:  Social History     Tobacco Use   • Smoking status: Former Smoker     Types: Cigarettes   • Smokeless tobacco: Never Used   Substance Use Topics   • Alcohol use: Never     Frequency: Never   • Drug use: Not on file       Review of systems:    He would not answer any of this no family is present I reviewed his chart fully    Objective:  Vitals Ranges:   Temp:  [98 °F (36.7  °C)-98.5 °F (36.9 °C)] 98 °F (36.7 °C)  Heart Rate:  [] 115  Resp:  [16] 16  BP: (125-152)/(63-79) 133/67      Physical Exam:  Despite being very agitated and mean earlier in the day he is now pleasant and states he wants to eat he is oriented x2 only he does not know where he is fund of knowledge poor attention span concentration good recent remote memory cannot be tested language I believe is normal he is not a phasic he counts fingers at 3 feet pupils 2 constricting to 1-1/2 unable to visualize disc retinas visual fields he would not follow face palate and tongue are symmetrical he would not follow other cranial nerve testing motor not a good effort but I believe it is 5- out of 5 x4 some distal atrophy but no fasciculations resting tremor reflexes absent throughout toes downgoing bilaterally sensation coordination station and gait completely impossible in this patient who began to argue more and more about when he can get something to eat heart is tachycardic without murmur neck supple without bruits extremities no clubbing cyanosis edema visual acuity he counts fingers at 3 feet    Results review:   I reviewed the patient's new clinical results.    Data review:  Lab Results (last 24 hours)     Procedure Component Value Units Date/Time    CBC Auto Differential [[id number]]  (Abnormal) Collected:  08/02/20 0941    Specimen:  Blood Updated:  08/02/20 1028     WBC 5.19 10*3/mm3      RBC 3.23 10*6/mm3      Hemoglobin 9.6 g/dL      Hematocrit 30.3 %      MCV 93.8 fL      MCH 29.7 pg      MCHC 31.7 g/dL      RDW 15.6 %      RDW-SD 53.6 fl      MPV 11.9 fL      Platelets 90 10*3/mm3      Neutrophil % 70.6 %      Lymphocyte % 12.1 %      Monocyte % 12.7 %      Eosinophil % 2.3 %      Basophil % 1.5 %      Immature Grans % 0.8 %      Neutrophils, Absolute 3.66 10*3/mm3      Lymphocytes, Absolute 0.63 10*3/mm3      Monocytes, Absolute 0.66 10*3/mm3      Eosinophils, Absolute 0.12 10*3/mm3      Basophils, Absolute  0.08 10*3/mm3      Immature Grans, Absolute 0.04 10*3/mm3      nRBC 0.0 /100 WBC     Comprehensive Metabolic Panel [[id number]]  (Abnormal) Collected:  08/02/20 0941    Specimen:  Blood Updated:  08/02/20 1027     Glucose 148 mg/dL      BUN 29 mg/dL      Creatinine 5.62 mg/dL      Sodium 130 mmol/L      Potassium 3.7 mmol/L      Chloride 93 mmol/L      CO2 23.8 mmol/L      Calcium 8.1 mg/dL      Total Protein 6.3 g/dL      Albumin 2.80 g/dL      ALT (SGPT) 14 U/L      AST (SGOT) 19 U/L      Alkaline Phosphatase 59 U/L      Total Bilirubin 0.7 mg/dL      eGFR Non African Amer 10 mL/min/1.73      Comment: <15 Indicative of kidney failure.        eGFR   Amer --     Comment: <15 Indicative of kidney failure.        Globulin 3.5 gm/dL      A/G Ratio 0.8 g/dL      BUN/Creatinine Ratio 5.2     Anion Gap 13.2 mmol/L     Narrative:       GFR Normal >60  Chronic Kidney Disease <60  Kidney Failure <15      Urine Culture - Urine, Urine, Catheter [[id number]]  (Normal) Collected:  08/01/20 1401    Specimen:  Urine, Catheter Updated:  08/02/20 1026     Urine Culture No growth    Hemoglobin A1c [[id number]]  (Abnormal) Collected:  08/02/20 0941    Specimen:  Blood Updated:  08/02/20 1012     Hemoglobin A1C 5.87 %     Narrative:       Hemoglobin A1C Ranges:    Increased Risk for Diabetes  5.7% to 6.4%  Diabetes                     >= 6.5%  Diabetic Goal                < 7.0%    Troponin [[id number]]  (Abnormal) Collected:  08/01/20 1729    Specimen:  Blood Updated:  08/01/20 1807     Troponin T 0.129 ng/mL     Narrative:       Troponin T Reference Range:  <= 0.03 ng/mL-   Negative for AMI  >0.03 ng/mL-     Abnormal for myocardial necrosis.  Clinicians would have to utilize clinical acumen, EKG, Troponin and serial changes to determine if it is an Acute Myocardial Infarction or myocardial injury due to an underlying chronic condition.       Results may be falsely decreased if patient taking Biotin.      Urinalysis, Microscopic  Only - Urine, Catheter [[id number]]  (Abnormal) Collected:  08/01/20 1401    Specimen:  Urine, Catheter Updated:  08/01/20 1701     RBC, UA 6-12 /HPF      WBC, UA 13-20 /HPF      Bacteria, UA None Seen /HPF      Squamous Epithelial Cells, UA 3-6 /HPF      Hyaline Casts, UA None Seen /LPF      Methodology Manual Light Microscopy    Urinalysis With Microscopic If Indicated (No Culture) - Urine, Catheter [[id number]]  (Abnormal) Collected:  08/01/20 1401    Specimen:  Urine, Catheter Updated:  08/01/20 1648     Color, UA Yellow     Appearance, UA Clear     pH, UA 8.0     Specific Gravity, UA 1.015     Glucose,  mg/dL (1+)     Ketones, UA Negative     Bilirubin, UA Negative     Blood, UA Small (1+)     Protein, UA >=300 mg/dL (3+)     Leuk Esterase, UA Moderate (2+)     Nitrite, UA Negative     Urobilinogen, UA 0.2 E.U./dL    Respiratory Panel PCR w/COVID-19(SARS-CoV-2) ARIELA/SNEHA/JL In-House, NP Swab in [facility], 8-12 Hr TAT - Swab, Nasopharynx [[id number]]  (Normal) Collected:  08/01/20 1350    Specimen:  Swab from Nasopharynx Updated:  08/01/20 1537     ADENOVIRUS, PCR Not Detected     Coronavirus 229E Not Detected     Coronavirus HKU1 Not Detected     Coronavirus NL63 Not Detected     Coronavirus OC43 Not Detected     COVID19 Not Detected     Human Metapneumovirus Not Detected     Human Rhinovirus/Enterovirus Not Detected     Influenza A PCR Not Detected     Influenza A H1 Not Detected     Influenza A H1 2009 PCR Not Detected     Influenza A H3 Not Detected     Influenza B PCR Not Detected     Parainfluenza Virus 1 Not Detected     Parainfluenza Virus 2 Not Detected     Parainfluenza Virus 3 Not Detected     Parainfluenza Virus 4 Not Detected     RSV, PCR Not Detected     Bordetella pertussis pcr Not Detected     Bordetella parapertussis PCR Not Detected     Chlamydophila pneumoniae PCR Not Detected     Mycoplasma pneumo by PCR Not Detected    Narrative:       Fact sheet for providers:  https://docs.Ditto/wp-content/uploads/GUZ9783-2209-FJ8.1-EUA-Provider-Fact-Sheet-3.pdf    Fact sheet for patients: https://docs.Ditto/wp-content/uploads/BJO2782-0799-SU6.1-EUA-Patient-Fact-Sheet-1.pdf  Fact sheet for providers: https://docs.Ditto/wp-content/uploads/YTV3789-3540-DP4.1-EUA-Provider-Fact-Sheet-3.pdf    Fact sheet for patients: https://docs.Ditto/wp-content/uploads/MLG1629-0183-OG5.1-EUA-Patient-Fact-Sheet-1.pdf           Imaging:  Imaging Results (Last 24 Hours)     Procedure Component Value Units Date/Time    MRI Brain Without Contrast [[id number]] Collected:  08/01/20 1514     Updated:  08/01/20 1546    Narrative:       MRI BRAIN WO CONTRAST-     INDICATIONS: Altered mental status, possible hemorrhage     TECHNIQUE: Multiplanar multisequence noncontrast magnetic resonance  imaging of the brain.     COMPARISON: CT from 08/01/2020     FINDINGS:     Several sequences are significantly degraded by motion artifact,  limiting the assessment.     The diffusion-weighted images show no restricted diffusion to suggest  acute infarct.     The midline structures appear unremarkable.     The brain parenchyma shows moderate periventricular white matter T2  FLAIR signal hyperintensities suggesting chronic small vessel ischemic  change in a patient this age.     Flow voids in the major arteries at the base of the brain appear  unremarkable. Left vertebral artery is dominant.     The paranasal sinuses, mastoid air cells, and orbits appear  unremarkable.       Impression:          No acute infarct. No findings to suggest hemorrhage at the midbrain,  although the exam is somewhat limited by motion artifact; the CT density  of concern on the earlier CT may represent vascular anomaly. Follow-up  evaluation with enhanced MRI may be helpful when/if possible, and close  follow-up CT could be obtained to ensure stability of the CT finding of  concern.     This report was finalized on 8/1/2020  "3:43 PM by Dr. Jayjay Corbett M.D.                Assessment and Plan:       Unresponsive    Acute metabolic encephalopathy    COPD (chronic obstructive pulmonary disease) (CMS/[facility])    Diabetes mellitus (CMS/[facility])    Hypertension    ESRD (end stage renal disease) (CMS/[facility])    UTI (urinary tract infection)    Elevated troponin    Anemia due to chronic kidney disease  First of all, this patient has had unresponsiveness at the dialysis center at the completion of his dialysis with associated hypotension.  This diagnosis is pretty straightforward.  Nonetheless he has been extremely agitated cursing racial slurs telling new doctors that he is going to \"kick their ass\" etc..  For me he is pretty calm and is wanting something to eat and we will start him out with some applesauce.  His MRI scan by my independent eyeball review is normal with no acute stroke.  Although he has been on Restoril and as needed 5 mg Norco at home I do not think he is going through a drug withdrawal syndrome.  His QT interval is 465 and I will use PRN Ativan for his agitation.  If that is not so helpful I will use low-dose Haldol it is the friendly is QT interval neuroleptic there is and I do not believe there is any other class of medications that will help this gentleman.  He has metabolic encephalopathy on top of vascular dementia related to hypertension diabetes and chronic renal failure, all superimposed almost certainly on top of a personality disorder.      Naveen Olivares MD  08/02/20  14:58  "

## 2020-08-02 NOTE — NURSING NOTE
I spoke with pt's sister, who is his POA. She stated that the pt was sent to [facility] last weekend for suicidal ideation after he threated to hang himself with the call light at his SNF. She states that he is very depressed and she would like for him to be evaluated. I spoke with Dr. Olivares and will place a consult for Psychiatry. Will monitor.

## 2020-08-02 NOTE — PLAN OF CARE
Bilateral soft wrist restraints continued to limit mobility. Pt is easily agitated and can become very angry, as well as being verbally abusive and threatens physical abuse at times. Orders for Ativan and Haldol PRN placed, Ativan give once and it did help to relax the pt. Pt's sister/POA mentioned his depressive mood and recent suicidal ideation, Psychiatry consult has been placed to see in AM. Still NPO and awaiting SLP eval, I left a voicemail for SLP but no response. Per Dr Olivares, pt can have applesauce to start. VSS. Will continue to monitor.       Problem: Restraint, Nonbehavioral (Nonviolent)  Goal: Rationale and Justification  Outcome: Ongoing (interventions implemented as appropriate)     Problem: Restraint, Nonbehavioral (Nonviolent)  Goal: Nonbehavioral (Nonviolent) Restraint: Absence of Injury/Harm  Outcome: Ongoing (interventions implemented as appropriate)     Problem: Restraint, Nonbehavioral (Nonviolent)  Goal: Nonbehavioral (Nonviolent) Restraint: Achievement of Discontinuation Criteria  Outcome: Ongoing (interventions implemented as appropriate)     Problem: Restraint, Nonbehavioral (Nonviolent)  Goal: Nonbehavioral (Nonviolent) Restraint: Preservation of Dignity and Wellbeing  Outcome: Ongoing (interventions implemented as appropriate)     Problem: Skin Injury Risk (Adult)  Goal: Identify Related Risk Factors and Signs and Symptoms  Description  Related risk factors and signs and symptoms are identified upon initiation of Human Response Clinical Practice Guideline (CPG).  Outcome: Ongoing (interventions implemented as appropriate)     Problem: Fall Risk (Adult)  Goal: Identify Related Risk Factors and Signs and Symptoms  Description  Related risk factors and signs and symptoms are identified upon initiation of Human Response Clinical Practice Guideline (CPG).  Outcome: Ongoing (interventions implemented as appropriate)     Problem: Patient Care Overview  Goal: Plan of Care Review  Outcome:  Ongoing (interventions implemented as appropriate)  Flowsheets (Taken 8/2/2020 1483)  Progress: no change  Plan of Care Reviewed With: patient

## 2020-08-02 NOTE — PROGRESS NOTES
[facility]     LOS: 1 day     Name: Lucien Avalos  Age: 75 y.o.  Sex: male  :  1945  MRN: [id number]         Primary Care Physician: Matilde Hi MD    NPO Diet    Subjective     Patient is seen at bedside, he is lying in bed.  He appears confused.    Objective   Temp:  [98 °F (36.7 °C)-98.5 °F (36.9 °C)] 98 °F (36.7 °C)  Heart Rate:  [] 115  Resp:  [16] 16  BP: (125-152)/(63-76) 133/67  SpO2:  [95 %-99 %] 95 %  on   ;   Device (Oxygen Therapy): room air  Body mass index is 24.76 kg/m².    Physical Exam    General:   Appears confused  Head and ENT: normocephalic and atraumatic.  Lungs: symmetric expansion and equal air entry bilaterally.  Heart: regular rate, rhythm, no murmurs.  Abdomen: soft, nontender, bowel sounds present.  Extremities:  No clubbing, cyanosis or edema.  Neurologic:   Unable to evaluate  Psychiatry:  Unable to evaluate  Skin:  Warm and no rash.    Reviewed medications and new clinical results        Results from last 7 days   Lab Units 20  0941 20  1214   WBC 10*3/mm3 5.19 7.82   HEMOGLOBIN g/dL 9.6* 10.5*   PLATELETS 10*3/mm3 90* 94*     Results from last 7 days   Lab Units 20  0941 20  1214   SODIUM mmol/L 130* 136   POTASSIUM mmol/L 3.7 4.0   CHLORIDE mmol/L 93* 96*   CO2 mmol/L 23.8 28.0   BUN mg/dL 29* 18   CREATININE mg/dL 5.62* 4.06*   CALCIUM mg/dL 8.1* 7.8*   GLUCOSE mg/dL 148* 152*     Lab Results   Component Value Date    ANIONGAP 13.2 2020     Glucose   Date/Time Value Ref Range Status   2020 1213 134 (H) 70 - 130 mg/dL Final     Hemoglobin A1C   Date Value Ref Range Status   2020 5.87 (H) 4.80 - 5.60 % Final     Estimated Creatinine Clearance: 11.9 mL/min (A) (by C-G formula based on SCr of 5.62 mg/dL (H)).    Lab Results   Component Value Date    COVID19 Not Detected 2020     Assessment/Plan   Active Hospital Problems    Diagnosis  POA   • **Unresponsive  [R41.89]  Unknown   • Acute metabolic encephalopathy [G93.41]  Yes   • COPD (chronic obstructive pulmonary disease) (CMS/[facility]) [J44.9]  Yes   • Diabetes mellitus (CMS/[facility]) [E11.9]  Yes   • Hypertension [I10]  Unknown   • ESRD (end stage renal disease) (CMS/[facility]) [N18.6]  Yes   • UTI (urinary tract infection) [N39.0]  Unknown   • Elevated troponin [R79.89]  Unknown   • Anemia due to chronic kidney disease [N18.9, D63.1]  Unknown      Resolved Hospital Problems   No resolved problems to display.     75 y.o. male      Assessment and plan  1. Acute metabolic encephalopathy, continue to monitor for mental status changes.  Neurology evaluation has been requested.  2.  Increased agitation, patient currently in restraints for safety.  If he continues to worsen we will consider Psychiatry evaluation.  3.  Urinary tract infection, he has received Rocephin, follow urine cultures.  4.  End-stage renal disease, continue dialysis per nephrology recommendations.  5.  COPD, chronic and stable.  6.  Hyponatremia, management per nephrology recommendations.  7.  Further plans based on hospital course.    Vince Otto MD   08/02/20  16:19
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
## 2020-08-02 NOTE — CONSULTS
Referring Provider: Dr. Rainer Stevenson  Reason for Consultation: ESRD    Subjective     Chief complaint   Chief Complaint   Patient presents with   • Altered Mental Status   • Hypotension       History of present illness:  76 yo WM with ESRD on HD TTS (reportedly [facility]; he identifies Dr. Donnell Reyes as his primary nephrologist), admitted yesterday following his HD treatment due to lethargy and decrease in responsiveness.  Renal consulted to provide HD.  Full PMH outlined below.  ROS not reliably obtainable as patient is confused, disoriented, and a bit confrontational.  He is in restraints; reportedly had been verbally abusive to staff throughout the evening yesterday and night.  · Reportedly had hypotension and change in mental status following his HD treatment yesterday, for which saline bolus given; cannot tell me where he receives HD and is not sure of his schedule (MWF versus TTS)  · No shortness of breath on room air lying flat; no chest pain; no leg swelling  · States that he had had abdominal pain that has since resolved; not aware of any diarrhea  · No fever    Past Medical History:   Diagnosis Date   • Anxiety    • CHF (congestive heart failure) (CMS/HCC)    • Chronic pain    • COPD (chronic obstructive pulmonary disease) (CMS/HCC)    • Diabetes mellitus (CMS/HCC)    • GERD (gastroesophageal reflux disease)    • Hyperlipidemia    • Hypertension    • Hypotension    • Insomnia    • Mood disorder (CMS/HCC)    • Renal failure    • Respiratory failure (CMS/[facility])      History reviewed. No pertinent surgical history.  History reviewed. No pertinent family history.  Social History     Tobacco Use   • Smoking status: Former Smoker     Types: Cigarettes   • Smokeless tobacco: Never Used   Substance Use Topics   • Alcohol use: Never     Frequency: Never   • Drug use: Not on file     Medications Prior to Admission   Medication Sig Dispense Refill Last Dose   • albuterol sulfate  (90 Base) MCG/ACT  "inhaler Inhale 2 puffs Every 6 (Six) Hours As Needed for Wheezing.      • atorvastatin (LIPITOR) 40 MG tablet Take 40 mg by mouth Daily.      • clopidogrel (PLAVIX) 75 MG tablet Take 75 mg by mouth Daily.      • FLUoxetine (PROzac) 20 MG capsule Take 20 mg by mouth Daily.      • HYDROcodone-acetaminophen (NORCO) 5-325 MG per tablet Take 1 tablet by mouth Every 6 (Six) Hours As Needed.      • hydrOXYzine (ATARAX) 50 MG tablet Take 50 mg by mouth Every 12 (Twelve) Hours.      • insulin detemir (LEVEMIR) 100 UNIT/ML injection Inject 30 Units under the skin into the appropriate area as directed Daily.      • ipratropium-albuterol (COMBIVENT RESPIMAT)  MCG/ACT inhaler Inhale 2 puffs Every 8 (Eight) Hours As Needed for Wheezing.      • losartan (COZAAR) 25 MG tablet Take 25 mg by mouth Daily.      • midodrine (PROAMATINE) 10 MG tablet Take 10 mg by mouth Daily. Give before dialysis, on dialysis day (Mon, Wed, Fri)      • Nutritional Supplements (PROMOD PO) Take 30 mL by mouth Daily.      • pantoprazole (PROTONIX) 40 MG EC tablet Take 40 mg by mouth 2 (two) times a day.      • sucralfate (CARAFATE) 1 g tablet Take 1 g by mouth 4 (Four) Times a Day.      • Sucroferric Oxyhydroxide (Velphoro) 500 MG chewable tablet Chew 1 tablet 3 (Three) Times a Day.      • temazepam (RESTORIL) 15 MG capsule Take 15 mg by mouth Every Night.        Allergies:  Patient has no known allergies.    Review of Systems  Not reliably obtainable; see HPI for pertinent information    Objective     Vital Signs  Temp:  [98 °F (36.7 °C)-98.5 °F (36.9 °C)] 98 °F (36.7 °C)  Heart Rate:  [] 115  Resp:  [16] 16  BP: (125-152)/(63-79) 133/67    Flowsheet Rows      First Filed Value   Admission Height  170.2 cm (67\") Documented at 08/01/2020 1247   Admission Weight  71.7 kg (158 lb 1.6 oz) Documented at 08/01/2020 1210           No intake/output data recorded.  I/O last 3 completed shifts:  In: 913.3 [I.V.:813.3; IV Piggyback:100]  Out: 800 " [Urine:800]    Intake/Output Summary (Last 24 hours) at 8/2/2020 1441  Last data filed at 8/2/2020 0600  Gross per 24 hour   Intake 913.33 ml   Output 800 ml   Net 113.33 ml       Physical Exam:  NAD; pleasant but argumentative; not oriented; looks older than stated age  Chronically ill-appearing  MMM; temporal wasting; head is atraumatic   No eye discharge; no scleral icterus  No JVD; no carotid bruits  Coarse anteriorly; not labored  RR, tachycardic, no rub  Soft, NT, +D, BS+  No edema; poor muscle tone  +clubbing  No asterixis  Moves all extremities   Speech is fluent but disorganized  Irritable mood; odd affect    Results Review:  Results from last 7 days   Lab Units 08/02/20  0941 08/01/20  1214   SODIUM mmol/L 130* 136   POTASSIUM mmol/L 3.7 4.0   CHLORIDE mmol/L 93* 96*   CO2 mmol/L 23.8 28.0   BUN mg/dL 29* 18   CREATININE mg/dL 5.62* 4.06*   CALCIUM mg/dL 8.1* 7.8*   BILIRUBIN mg/dL 0.7 1.0   ALK PHOS U/L 59 66   ALT (SGPT) U/L 14 15   AST (SGOT) U/L 19 19   GLUCOSE mg/dL 148* 152*       Estimated Creatinine Clearance: 11.9 mL/min (A) (by C-G formula based on SCr of 5.62 mg/dL (H)).    Results from last 7 days   Lab Units 08/01/20  1214   MAGNESIUM mg/dL 1.8       Results from last 7 days   Lab Units 08/02/20  0941 08/01/20  1214   WBC 10*3/mm3 5.19 7.82   HEMOGLOBIN g/dL 9.6* 10.5*   PLATELETS 10*3/mm3 90* 94*       Results from last 7 days   Lab Units 08/01/20  1214   INR  1.08       Active Medications    cefTRIAXone 1 g Intravenous Q24H   sodium chloride 10 mL Intravenous Q12H       dextrose 5 % and sodium chloride 0.45 % 100 mL/hr Last Rate: 100 mL/hr (08/01/20 2152)       Assessment/Plan   Assessment  1.  ESRD: Central volume fine by exam, though chest x-ray yesterday did suggest some vascular prominence.  Hyponatremia on hypotonic fluids.  Potassium is normal; normal anion gap.  Last HD was yesterday  2.  Unresponsiveness: he is fully awake and is quite agitated.  Remains in restraints after being  combative yesterday  3.  Possible UTI: urine is difficult to interpret in setting of ESRD and his meager urine output.  Only has 13-20 WBCs/hpf; urine culture remains NGTD   4.  Anemia of ESRD  5.  Thrombocytopenia  6.  DM2  7.  COPD  8.  Elevated troponin      Unresponsive    Acute metabolic encephalopathy    COPD (chronic obstructive pulmonary disease) (CMS/[facility])    Diabetes mellitus (CMS/[facility])    Hypertension    ESRD (end stage renal disease) (CMS/[facility])    UTI (urinary tract infection)    Elevated troponin    Anemia due to chronic kidney disease      Plan  1.  Continue HD on TTS schedule  2.  Stop IVF    I discussed the patient's findings and my recommendations with the patient and with Shantell, his nurse  Emory Mansfield MD  08/02/20  14:41
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
## 2020-08-03 NOTE — PLAN OF CARE
Problem: Patient Care Overview  Goal: Plan of Care Review  Flowsheets (Taken 8/3/2020 1113)  Plan of Care Reviewed With: patient  Outcome Summary: Clinical swallow eval completed. Recommend mechanical soft and thin liquids. Recommend meds whole/crushed with thin or puree. Recommend upright, slow rate, alternate liquids/solids. ST to follow for diet tolerance and re-eval as appropriate.   Patient was not wearing a face mask during this therapy encounter. Therapist used appropriate personal protective equipment including mask, eye protection and gloves.  Mask used was standard procedure mask. Appropriate PPE was worn during the entire therapy session. Hand hygiene was completed before and after therapy session. Patient is not in enhanced droplet precautions.

## 2020-08-03 NOTE — PROGRESS NOTES
"   LOS: 2 days    Patient Care Team:  Matilde Hi MD as PCP - General (Internal Medicine)    Chief Complaint:    Chief Complaint   Patient presents with   • Altered Mental Status   • Hypotension     Follow-up end-stage renal  Subjective     Interval History:   The patient is asleep, he has been given Zyprexa earlier and he is sound asleep, he was agitated earlier today.      Review of Systems:   As noted    Objective     Vital Signs  Temp:  [98 °F (36.7 °C)-98.5 °F (36.9 °C)] 98 °F (36.7 °C)  Heart Rate:  [84-95] 88  Resp:  [14-20] 20  BP: (143-159)/(73-86) 143/84    Flowsheet Rows      First Filed Value   Admission Height  170.2 cm (67\") Documented at 08/01/2020 1247   Admission Weight  71.7 kg (158 lb 1.6 oz) Documented at 08/01/2020 1210          No intake/output data recorded.  I/O last 3 completed shifts:  In: 1203.3 [P.O.:240; I.V.:813.3; IV Piggyback:150]  Out: 800 [Urine:800]    Intake/Output Summary (Last 24 hours) at 8/3/2020 1620  Last data filed at 8/3/2020 0300  Gross per 24 hour   Intake 340 ml   Output --   Net 340 ml       Physical Exam:  General Appearance: Obtunded, sedated, no acute distress, appears to be chronically ill  Skin: warm and dry  HEENT: Pupils round reactive to light, nonicteric sclerae  Neck: No JVD, no carotid  Lungs: CTA, unlabored breathing effort  Heart: RRR, normal S1 and S2, no S3, no rub  Abdomen: soft, nontender,  present bowel sounds to auscultation  : no palpable bladder,  Extremities: no edema, cyanosis or clubbing, functional AV fistula in the right upper  Neuro: Unable to assess        Results Review:    Results from last 7 days   Lab Units 08/03/20  0940 08/02/20  0941 08/01/20  1229 08/01/20  1214   SODIUM mmol/L 131* 130*  --  136   POTASSIUM mmol/L 4.2 3.7  --  4.0   CHLORIDE mmol/L 94* 93*  --  96*   CO2 mmol/L 24.1 23.8  --  28.0   BUN mg/dL 38* 29*  --  18   CREATININE mg/dL 7.16* 5.62* 4.00* 4.06*   CALCIUM mg/dL 8.3* 8.1*  --  7.8*   BILIRUBIN mg/dL  " --  0.7  --  1.0   ALK PHOS U/L  --  59  --  66   ALT (SGPT) U/L  --  14  --  15   AST (SGOT) U/L  --  19  --  19   GLUCOSE mg/dL 149* 148*  --  152*       Estimated Creatinine Clearance: 9.3 mL/min (A) (by C-G formula based on SCr of 7.16 mg/dL (H)).    Results from last 7 days   Lab Units 08/03/20  0940 08/01/20  1214   MAGNESIUM mg/dL 2.0 1.8   PHOSPHORUS mg/dL 6.8*  --              Results from last 7 days   Lab Units 08/03/20  0940 08/02/20  0941 08/01/20  1214   WBC 10*3/mm3 6.79 5.19 7.82   HEMOGLOBIN g/dL 9.3* 9.6* 10.5*   PLATELETS 10*3/mm3 89* 90* 94*       Results from last 7 days   Lab Units 08/01/20  1214   INR  1.08         Imaging Results (Last 24 Hours)     ** No results found for the last 24 hours. **          cefTRIAXone 1 g Intravenous Q24H   sodium chloride 10 mL Intravenous Q12H          Medication Review:   Current Facility-Administered Medications   Medication Dose Route Frequency Provider Last Rate Last Dose   • acetaminophen (TYLENOL) tablet 650 mg  650 mg Oral Q4H PRN Rainer Stevenson MD        Or   • acetaminophen (TYLENOL) 160 MG/5ML solution 650 mg  650 mg Oral Q4H PRN Rainer Stevenson MD        Or   • acetaminophen (TYLENOL) suppository 650 mg  650 mg Rectal Q4H PRN Rainer Stevenson MD       • cefTRIAXone (ROCEPHIN) IVPB 1 g  1 g Intravenous Q24H Rainer Stevenson  mL/hr at 08/02/20 2152 1 g at 08/02/20 2152   • nitroglycerin (NITROSTAT) SL tablet 0.4 mg  0.4 mg Sublingual Q5 Min PRN Rainer Stevenson MD       • OLANZapine (zyPREXA) injection 5 mg  5 mg Intramuscular Q8H PRN Luis F Myers III, MD   5 mg at 08/03/20 1322   • ondansetron (ZOFRAN) injection 4 mg  4 mg Intravenous Q6H PRN Rainer Stevenson MD       • sodium chloride 0.9 % flush 10 mL  10 mL Intravenous PRN Rainer Stevenson MD       • sodium chloride 0.9 % flush 10 mL  10 mL Intravenous Q12H Rainer Stevenson MD   10 mL at 08/03/20 0959   • sodium chloride 0.9 % flush 10 mL  10 mL Intravenous PRN Rainer Stevenson MD            Assessment/Plan   1.  End-stage renal disease, on maintenance hemodialysis on dialysis every Tuesday, Thursday and Saturday, he appears to be euvolemic, sodium is 131, remainder of electrolytes within acceptable range  2.  Altered mental state  3.  Anemia and thrombocytopenia, hemoglobin today is 9.3 and platelet 89,000  4.  Diabetes mellitus type 2  5.  Possible UTI, difficult to interpret with ESRD and his low urine output  6.  Altered mental status  7.  COPD    Plan:  1.  Continue the same treatment  2.  Hemodialysis tomorrow  3.  Surveillance labs          Zeus Paulson MD  08/03/20  16:20

## 2020-08-03 NOTE — PROGRESS NOTES
"DOS: 8/3/2020  NAME: Lucien Avalos   : 1945  PCP: Matilde Hi MD  Chief Complaint   Patient presents with   • Altered Mental Status   • Hypotension     Patient seen in follow-up today; new to me          Subjective: No events overnight. Patient w/ intermittent disorientation. He denies any new complaints/concerns except for hunger.     No family at bedside.     Objective:  Vital signs: /86 (BP Location: Left arm, Patient Position: Lying)   Pulse 90   Temp 98.5 °F (36.9 °C) (Oral)   Resp 16   Ht 170.2 cm (67\")   Wt 71.7 kg (158 lb 1.6 oz)   SpO2 90%   BMI 24.76 kg/m²       HEENT: Normocephalic, atraumatic   COR: RRR  Resp: Even and unlabored  Extremities: Equal pulses, nondistal embolization    Neurological:   MS: Alert Oriented to current location, year, ,year and month  . Language normal. No neglect. Higher integrative function normal  CN: II-XII normal  Motor: 5/5, normal tone  Reflexes: Toes down going   Sensory: Intact  Coordination: Normal    Laboratory results:  Lab Results   Component Value Date    GLUCOSE 148 (H) 2020    CALCIUM 8.1 (L) 2020     (L) 2020    K 3.7 2020    CO2 23.8 2020    CL 93 (L) 2020    BUN 29 (H) 2020    CREATININE 5.62 (H) 2020    EGFRIFAFRI  2020      Comment:      <15 Indicative of kidney failure.    EGFRIFNONA 10 (L) 2020    BCR 5.2 (L) 2020    ANIONGAP 13.2 2020     Lab Results   Component Value Date    WBC 5.19 2020    HGB 9.6 (L) 2020    HCT 30.3 (L) 2020    MCV 93.8 2020    PLT 90 (L) 2020     No results found for: CHOL  Lab Results   Component Value Date    HDL 24 (L) 2019     Lab Results   Component Value Date    LDL 41 2019     Lab Results   Component Value Date    TRIG 94 2019     Lab Results   Component Value Date    TSH 1.470 2019     No results found for: NMQHCMIF10  Lab Results   Component Value Date    HGBA1C " 5.87 (H) 08/02/2020     Lab Results   Component Value Date    CHLPL 84 12/18/2019     Lab Results   Component Value Date    TRIG 94 12/18/2019     Lab Results   Component Value Date    HDL 24 (L) 12/18/2019     Lab Results   Component Value Date    LDL 41 12/18/2019             Review and interpretation of imaging:  MRI BRAIN WO CONTRAST-     INDICATIONS: Altered mental status, possible hemorrhage     TECHNIQUE: Multiplanar multisequence noncontrast magnetic resonance  imaging of the brain.     COMPARISON: CT from 08/01/2020     FINDINGS:     Several sequences are significantly degraded by motion artifact,  limiting the assessment.     The diffusion-weighted images show no restricted diffusion to suggest  acute infarct.     The midline structures appear unremarkable.     The brain parenchyma shows moderate periventricular white matter T2  FLAIR signal hyperintensities suggesting chronic small vessel ischemic  change in a patient this age.     Flow voids in the major arteries at the base of the brain appear  unremarkable. Left vertebral artery is dominant.     The paranasal sinuses, mastoid air cells, and orbits appear  unremarkable.     IMPRESSION:     No acute infarct. No findings to suggest hemorrhage at the midbrain,  although the exam is somewhat limited by motion artifact; the CT density  of concern on the earlier CT may represent vascular anomaly. Follow-up  evaluation with enhanced MRI may be helpful when/if possible, and close  follow-up CT could be obtained to ensure stability of the CT finding of  concern.     This report was finalized on 8/1/2020 3:43 PM by Dr. Jayjay Corbett M.D.     CT ANGIOGRAM HEAD AND NECK AND CT PERFUSION STUDY     CLINICAL HISTORY: Altered mental status.     Radiation dose reduction techniques were utilized, including automated  exposure control and exposure modulation based on body size. CT scan of  the head was obtained with 3 mm axial images without the use  of IV contrast.  The patient underwent a CT  perfusion study with a dynamic bolus of IV contrast. Standard perfusion  maps were constructed. The patient then underwent a CT angiogram of the  head and neck with 1 mm axial images following the administration of IV  contrast. Sagittal, coronal, and 3-dimensional reconstructed images were  obtained.  Percent stenosis was assessed in accordance with NASCET  criteria.     FINDINGS:     NONCONTRAST HEAD CT: On the noncontrast head CT, a 3-4 mm hyperdense  focus at the midbrain, for example axial image 17 could be a small focus  of blood or vascular anomaly, further evaluation with MRI may be  helpful, close interval follow-up can characterize change. Moderate  periventricular hypodensities suggest chronic small vessel ischemic  change in a patient this age. Arterial calcifications are seen at the  base the brain.     No enhancing lesions of brain are noted following contrast material  administration.        CT PERFUSION STUDY:  No CBF (under 30%) deficit or perfusion abnormality  is demonstrated where the T MAX is greater than 6 seconds. The  calculated mismatch volume was 0 cc.     CTA HEAD and neck: The CT angiogram of the head and neck demonstrates  calcifications in the bilateral cervical and intracranial carotid  arteries, with estimated 65% narrowing at the cavernous segment of the  right ICA, 75% narrowing at the origin of the cavernous segment of the  left ICA otherwise without high-grade stenosis (0-49% stenosis). About  20% narrowing at the origin of the left cervical ICA. Otherwise, no  hemodynamically significant focal stenosis, aneurysm, or dissection in  the cervical carotid or vertebral arteries, or in the arteries at the  base of the brain. The left P1 segment is diminutive, the left PCA being  at least partially supplied by the anterior circulation. The lower  aspect of the left vertebral artery is obscured by attenuation artifact  from densely opacified left subclavian  vein, precluding its assessment.     Lobulated mucous versus mucosal lesion measuring 1.7 cm in the posterior  right aspect of the trachea at the level of the thoracic inlet, axial  image 65, follow-up or direct visualization recommended.     Cervical spondyloarthropathy is present with uncovertebral joint and  facet hypertrophy result in bilateral neuroforaminal narrowing, more  prominent on the right at C4/5, C5/6, and on the left at C3/4, C5/6.           IMPRESSION:        1. No perfusion abnormality to suggest completed or threatened acute  infarct.  2. No arterial cutoff. Narrowing at the bilateral ICA cavernous  segments, estimated at 65% on the right, 75% on the left.  3. A 3 to 4 mm hyperdense focus at the mid brain remains indeterminate,  could be a small focus of hemorrhage or vascular abnormality. Close  follow-up/further evaluation recommended.  4. Possible mucosal lesion in the trachea versus lobulated mucus.        The findings were discussed by telephone with Dr. Sanchez at 1235,  1248, 08/01/2020     This report was finalized on 8/1/2020 12:56 PM by Dr. Jayjay Corbett M.D.     XR CHEST 1 VW-     HISTORY: Male who is 75 years-old,  stroke     TECHNIQUE: Frontal view of the chest     COMPARISON: None available     FINDINGS: Heart is mildly enlarged. Mild prominence of vascular and  interstitial markings. Linear likely atelectasis or scarring the left  midlung. No pleural effusion or pneumothorax. No acute osseous process.     IMPRESSION:  Small likely atelectasis or scarring. Follow-up as  clinically indicated.     This report was finalized on 8/1/2020 1:34 PM by Dr. Jayjay Corbett M.D.    Impression: This is a 75-year-old male with history of diabetes mellitus, GERD, hypertension, end-stage renal disease, hyperlipidemia, mood disorder, COPD/former tobacco abuse and congestive heart failure who presented to [facility] on 8/1 due to becoming unresponsive after completion of  dialysis with associated hypotension for which our service was consulted.  Initial CT of the head showed 3 to 4 mm hyperdense focus within the midbrain along with moderate periventricular hypodensities thought to be suggestive of chronic small vessel ischemic changes and arterial calcifications noted at the base of the brain.  There is no evidence of in his enhancing lesion.  No perfusion abnormalities were seen, no arterial cutoffs was noted narrowing of bilateral ICA at cavernous segments noted (65% on right and 75% on left).  Incidental mucosal lesion noted in tracheitis versus lobulated mucus.  Since admission patient found to have UTI; receiving IV Rocephin.    Neurologically,stable . Recommendations below. PT/OT/ST. CCP to assist with discharge planning. Call RRT for any acute neurological changes and/or concerns. No further neurology workup indicated. We will sign off and see again upon request.        Differential:  1.  Metabolic encephalopathy   2.  Concern for vascular dementia secondary to uncontrolled hypertension, diabetes and CKD  3.  UTI; treated with Rocephin    Plan:  Haldol and Ativan as needed as written for increased agitation/anxiety      Case reviewed with attending

## 2020-08-03 NOTE — PROGRESS NOTES
Continued Stay Note  [facility]     Patient Name: Lucien Avalos  MRN: [id number]  Today's Date: 8/3/2020    Admit Date: 8/1/2020    Discharge Plan     Row Name 08/03/20 1259       Plan    Plan Comments  Spoke with Nallely/Brandee who said this patient is from [facility] and has a Medicaid bed hold for  Level of Care. Per Eugenie, the patient can return to [facility] prior to obtaining precert since he is a current resident. CCP following. --LAWANDA Mattson        Discharge Codes    No documentation.             Milly Bartlett RN

## 2020-08-03 NOTE — PAYOR COMM NOTE
"P: [phone]    INITIAL CLINICAL FOR REVIEW    ID #[id number]        Allen Avalos (75 y.o. Male)     Date of Birth Social Security Number Address Home Phone MRN    1945  [place] [address] [place] [id number] [phone] [id number]    Nondenominational Marital Status          None Unknown       Admission Date Admission Type Admitting Provider Attending Provider Department, Room/Bed    8/1/20 Emergency Rainer Stevenson MD Kapila, Abhishek, MD [facility] [address] [place], P591/1    Discharge Date Discharge Disposition Discharge Destination                       Attending Provider:  Vince Otto MD    Allergies:  No Known Allergies    Isolation:  None   Infection:  None   Code Status:  No CPR    Ht:  170.2 cm (67\")   Wt:  71.7 kg (158 lb 1.6 oz)    Admission Cmt:  None   Principal Problem:  Unresponsive [R41.89]                 Active Insurance as of 8/1/2020     Primary Coverage     Payor Plan Insurance Group Employer/Plan Group    [facility] MEDICARE REPLACEMENT WELLCARE MEDICARE REPLACEMENT [id number]     Payor Plan Address Payor Plan Phone Number Payor Plan Fax Number Effective Dates    [address] [address] [phone]  8/1/2020 - None Entered    [facility] [id number]       Subscriber Name Subscriber Birth Date Member ID       Allen Avalos 1945 [id number]           Secondary Coverage     Payor Plan Insurance Group Employer/Plan Group    [place]      Payor Plan Address Payor Plan Phone Number Payor Plan Fax Number Effective Dates    [address] [phone]  8/1/2020 - None Entered    [facility] [id number]       Subscriber Name Subscriber Birth Date Member ID       ALLEN AVALOS 1945 [id number]                 Emergency Contacts      (Rel.) Home Phone Work Phone Mobile Phone    ROHINI ZHAO (Sister) [phone] -- --               History & Physical      Rainer Stevenson MD at 08/01/20 1915          Internal medicine history " "and physical  INTERNAL MEDICINE   [facility]       Patient Identification:  Name: Lucien Avalos  Age: 75 y.o.  Sex: male  :  1945  MRN: [id number]                   Primary Care Physician: Matilde Hi MD                               Date of admission:2020    Chief Complaint: Unresponsiveness noted at dialysis center after completion of dialysis with associated hypotension.    History of Present Illness:   Source information emergency room records and previous hospitalization records.  Patient appears to be more awake during my evaluation but either he has dementia or a poor personality as he is using racial slurs against the nurse and myself and very agitated and using threats such as\" he is going to kick my ass\".  This is dramatically different than him being completely unarousable and responsive in the emergency room.  Patient is a 75-year-old male who has history of end-stage renal disease on hemodialysis receives  dialysis was apparently in his usual state of his health when he was sent to the dialysis center from [facility].  Patient apparently is alert and oriented and ambulatory and conversant.  After dialysis patient became unresponsive and hypotensive and received a bolus of fluid and EMS was called.  Apparently in the past he has done the same thing and becomes very weak and confused and lethargic when too much fluid is removed during his dialysis treatment.  He has history of orthostatic hypotension and has been on midodrine.  Extensive work-up in the emergency room failed to reveal any acute process and he did not respond to Narcan as well as D10 administration.  We are asked to admit the patient to observe for recovery of his mental status and possible neurological evaluation.      Past Medical History:  Past Medical History:   Diagnosis Date   • Anxiety    • CHF (congestive heart failure) (CMS/[facility])    • Chronic pain    • COPD " (chronic obstructive pulmonary disease) (CMS/[facility])    • Diabetes mellitus (CMS/[facility])    • GERD (gastroesophageal reflux disease)    • Hyperlipidemia    • Hypertension    • Hypotension    • Insomnia    • Mood disorder (CMS/HCC)    • Renal failure    • Respiratory failure (CMS/[facility])      Past Surgical History:  History reviewed. No pertinent surgical history.   Home Meds:  Medications Prior to Admission   Medication Sig Dispense Refill Last Dose   • albuterol sulfate  (90 Base) MCG/ACT inhaler Inhale 2 puffs Every 6 (Six) Hours As Needed for Wheezing.      • atorvastatin (LIPITOR) 40 MG tablet Take 40 mg by mouth Daily.      • clopidogrel (PLAVIX) 75 MG tablet Take 75 mg by mouth Daily.      • FLUoxetine (PROzac) 20 MG capsule Take 20 mg by mouth Daily.      • HYDROcodone-acetaminophen (NORCO) 5-325 MG per tablet Take 1 tablet by mouth Every 6 (Six) Hours As Needed.      • hydrOXYzine (ATARAX) 50 MG tablet Take 50 mg by mouth Every 12 (Twelve) Hours.      • insulin detemir (LEVEMIR) 100 UNIT/ML injection Inject 30 Units under the skin into the appropriate area as directed Daily.      • ipratropium-albuterol (COMBIVENT RESPIMAT)  MCG/ACT inhaler Inhale 2 puffs Every 8 (Eight) Hours As Needed for Wheezing.      • losartan (COZAAR) 25 MG tablet Take 25 mg by mouth Daily.      • midodrine (PROAMATINE) 10 MG tablet Take 10 mg by mouth Daily. Give before dialysis, on dialysis day (Mon, Wed, Fri)      • Nutritional Supplements (PROMOD PO) Take 30 mL by mouth Daily.      • pantoprazole (PROTONIX) 40 MG EC tablet Take 40 mg by mouth 2 (two) times a day.      • sucralfate (CARAFATE) 1 g tablet Take 1 g by mouth 4 (Four) Times a Day.      • Sucroferric Oxyhydroxide (Velphoro) 500 MG chewable tablet Chew 1 tablet 3 (Three) Times a Day.      • temazepam (RESTORIL) 15 MG capsule Take 15 mg by mouth Every Night.        Current Meds:     Current Facility-Administered Medications:   •  sodium chloride 0.9 % flush 10 mL, 10  "mL, Intravenous, PRN, Shantanu Funes MD  Allergies:  No Known Allergies  Social History:   Social History     Tobacco Use   • Smoking status: Unknown If Ever Smoked   Substance Use Topics   • Alcohol use: Not on file      Family History:  History reviewed. No pertinent family history.       Review of Systems  See history of present illness and past medical history.  Could not be obtained from the patient because of reasons as mentioned above.      Vitals:   /63   Pulse 86   Temp 96.1 °F (35.6 °C) (Tympanic)   Resp 15   Ht 170.2 cm (67\")   Wt 71.7 kg (158 lb 1.6 oz)   SpO2 99%   BMI 24.76 kg/m²    I/O:     Intake/Output Summary (Last 24 hours) at 8/1/2020 1915  Last data filed at 8/1/2020 1832  Gross per 24 hour   Intake 50 ml   Output --   Net 50 ml     Exam: Was very limited.  Patient is examined using the personal protective equipment as per guidelines from infection control for this particular patient as enacted.  Hand washing was performed before and after patient interaction.  General Appearance:   Currently alert extremely agitated uncooperative and seem to have preserved language skills and appears to be using threatening statements and slurs purposefully.  I was told by nursing staff that they had a conversation with his wife and she told them that he has this type of personality.   Head:    Normocephalic, without obvious abnormality, atraumatic   Eyes:   Ocular movement seems to be intact his gaze are conjugate.   Ears:    Normal external ear canals, both ears   Nose:   Nares normal   Throat:  No obvious lesions of the lips noted oral examination could not be performed because of his uncooperation and threatening behavior   Neck:   Supple, symmetrical, trachea midline, no adenopathy;     thyroid:  no enlargement/tenderness/nodules; no carotid    bruit or JVD   Back:    Not examined   Lungs:    No obvious use of accessory muscles of breathing noted, auscultation not performed as patient is " threatening.   Chest Wall:    No tenderness or deformity    Heart:   Appears to have a regular rhythm on the monitor cardiac examination not performed   Abdomen:    Abdominal examination not performed but on inspection does not appear to have any acute process   Extremities:  Ecchymotic changes noted   Pulses:  Not been able to be palpated because of his behavior and threatening disposition   Skin:   Skin color normal, Skin is warm and dry,  no rashes or palpable lesions   Neurologic:  Grossly nonfocal, fully awake       Data Review:      I reviewed the patient's new clinical results.  Results from last 7 days   Lab Units 08/01/20  1214   WBC 10*3/mm3 7.82   HEMOGLOBIN g/dL 10.5*   PLATELETS 10*3/mm3 94*     Results from last 7 days   Lab Units 08/01/20  1214   SODIUM mmol/L 136   POTASSIUM mmol/L 4.0   CHLORIDE mmol/L 96*   CO2 mmol/L 28.0   BUN mg/dL 18   CREATININE mg/dL 4.06*   CALCIUM mg/dL 7.8*   GLUCOSE mg/dL 152*     Ct Angiogram Head    Result Date: 8/1/2020    1. No perfusion abnormality to suggest completed or threatened acute infarct. 2. No arterial cutoff. Narrowing at the bilateral ICA cavernous segments, estimated at 65% on the right, 75% on the left. 3. A 3 to 4 mm hyperdense focus at the mid brain remains indeterminate, could be a small focus of hemorrhage or vascular abnormality. Close follow-up/further evaluation recommended. 4. Possible mucosal lesion in the trachea versus lobulated mucus.   The findings were discussed by telephone with Dr. Sanchez at 1235, 1248, 08/01/2020  This report was finalized on 8/1/2020 12:56 PM by Dr. Jayjay Corbett M.D.      Ct Angiogram Neck    Result Date: 8/1/2020    1. No perfusion abnormality to suggest completed or threatened acute infarct. 2. No arterial cutoff. Narrowing at the bilateral ICA cavernous segments, estimated at 65% on the right, 75% on the left. 3. A 3 to 4 mm hyperdense focus at the mid brain remains indeterminate, could be a small focus of  hemorrhage or vascular abnormality. Close follow-up/further evaluation recommended. 4. Possible mucosal lesion in the trachea versus lobulated mucus.   The findings were discussed by telephone with Dr. Sanchez at 1235, 1248, 08/01/2020  This report was finalized on 8/1/2020 12:56 PM by Dr. Jayjay Corbett M.D.      Mri Brain Without Contrast    Result Date: 8/1/2020   No acute infarct. No findings to suggest hemorrhage at the midbrain, although the exam is somewhat limited by motion artifact; the CT density of concern on the earlier CT may represent vascular anomaly. Follow-up evaluation with enhanced MRI may be helpful when/if possible, and close follow-up CT could be obtained to ensure stability of the CT finding of concern.  This report was finalized on 8/1/2020 3:43 PM by Dr. Jayjay Corbett M.D.      Xr Chest 1 View    Result Date: 8/1/2020  Small likely atelectasis or scarring. Follow-up as clinically indicated.  This report was finalized on 8/1/2020 1:34 PM by Dr. Jayjay Corbett M.D.      Ct Cerebral Perfusion With & Without Contrast    Result Date: 8/1/2020    1. No perfusion abnormality to suggest completed or threatened acute infarct. 2. No arterial cutoff. Narrowing at the bilateral ICA cavernous segments, estimated at 65% on the right, 75% on the left. 3. A 3 to 4 mm hyperdense focus at the mid brain remains indeterminate, could be a small focus of hemorrhage or vascular abnormality. Close follow-up/further evaluation recommended. 4. Possible mucosal lesion in the trachea versus lobulated mucus.   The findings were discussed by telephone with Dr. Sanchez at 1235, 1248, 08/01/2020  This report was finalized on 8/1/2020 12:56 PM by Dr. Jayjay Corbett M.D.      ECG 12 Lead   Final Result   HEART RATE= 89  bpm   RR Interval= 672  ms   DE Interval= 192  ms   P Horizontal Axis=   deg   P Front Axis= 85  deg   QRSD Interval= 92  ms   QT Interval= 465  ms   QRS Axis= 14  deg   T Wave Axis= 140   deg   - ABNORMAL ECG -   Sinus rhythm   Atrial premature complex   Probable left atrial enlargement   Abnormal T, consider ischemia, lateral leads   Prolonged QT interval   NO PRIOR TRACING AVAILABLE FOR COMPARISON   Electronically Signed By: Ventura Turner ([facility] 01-Aug-2020 18:42:51   Date and Time of Study: 2020-08-01 12:47:03            Assessment:  Active Hospital Problems    Diagnosis POA   • **Unresponsive [R41.89] Unknown   • Acute metabolic encephalopathy [G93.41] Yes   • COPD (chronic obstructive pulmonary disease) (CMS/[facility]) [J44.9] Yes   • Diabetes mellitus (CMS/[facility]) [E11.9] Yes   • Hypertension [I10] Unknown   • ESRD (end stage renal disease) (CMS/[facility]) [N18.6] Yes   • UTI (urinary tract infection) [N39.0] Unknown   • Elevated troponin [R79.89] Unknown   • Anemia due to chronic kidney disease [N18.9, D63.1] Unknown       Medical decision making:  Altered mental status manifesting as decreased level of responsiveness following dialysis with documented hypotension with similar episode of less severity occurred in the past after dialysis and associated hypotension.  This likely represent hypotensive encephalopathy as patient improved after IV fluids and restoration of his blood pressure.  Plan is to monitor for any evolving neurological status neurochecks and neurology consultation and speech evaluation.  Keeping him n.p.o. until speech evaluates the patient and provide him with seizure precautions.  Diabetes mellitus-continue with Accu-Cheks and sliding scale coverage and avoid hypoglycemic episodes.  Keep him n.p.o. until speech evaluates the patient and his mental status completely clears.  Significant agitation and disruptive behavior-provide him with as needed restraint to protect himself from self-injurious behavior and protect the caregiver around him.  Access evaluation is warranted.  Possible UTI-patient has received ceftriaxone in the emergency room follow-up on urine culture.  Elevated  troponin significance in this situation is unclear given his end-stage renal disease-monitor his troponin levels and consider cardiology evaluation if it is trending upward.  COPD-provide him with as needed nebulizer treatment and continuous pulse ox monitoring.  Anemia likely due to chronic disease plan is to monitor.  End-stage renal disease on hemodialysis-consulted nephrology.  Rainer Stevenson MD   8/1/2020  19:15  Much of this encounter note is an electronic transcription/translation of spoken language to printed text. The electronic translation of spoken language may permit erroneous, or at times, nonsensical words or phrases to be inadvertently transcribed; Although I have reviewed the note for such errors, some may still exist      Electronically signed by Rainer Stevenson MD at 08/02/20 0530          Emergency Department Notes      Richard Willard RN at 08/01/20 1202        Pt presents to ED via EMS from dialysis center. Pt is a at [facility]. Pt was getting his dialysis treatment today, and finished his dose when staff said he quit responding to them. Upon EMS arrival pt was hypotensive, and will respond to painful stimuli. Facility gave him 1,000 ML of NS. EMS gave 100 ml of D-10, and two rounds of narcan. Pt is not verbal at this time, and facility reports he is typically A&Ox3.      Richard Willard RN  08/01/20 1205      Electronically signed by Richard Willard RN at 08/01/20 1205     Shantanu Funes MD at 08/01/20 1207           EMERGENCY DEPARTMENT ENCOUNTER    Room Number:  21/21  Date of encounter:  8/1/2020  PCP: Matilde Hi MD  Historian: [facility] report    Patient was placed in face mask during triage process. Patient was wearing facemask when I entered the room and throughout our encounter. I wore full protective equipment throughout this patient encounter including a face mask, eye protection, and gloves. Hand hygiene was performed before donning protective  equipment and again following doffing of PPE after leaving the room.      HPI:  Chief Complaint: Altered mental status  A complete HPI/ROS/PMH/PSH/SH/FH are unobtainable due to: AMS  Context: Lucien Avalos is a 75 y.o. male who presents to the ED for evaluation of altered mental status that was noted at completion of his dialysis today.  Patient is apparently new to the care facility where he lives and to the dialysis center.  They noted that he was at least talking at the initiation of dialysis and felt like he was sleeping.  When he completed dialysis he attempted to wake him and he was unresponsive.  He reportedly received 1 L normal saline at the dialysis center following dialysis as well as an amp of D50 in route by EMS for glucose of 90.  Remainder of history unobtainable      MEDICAL HISTORY REVIEW  Unavailable    PAST MEDICAL HISTORY  Active Ambulatory Problems     Diagnosis Date Noted   • No Active Ambulatory Problems     Resolved Ambulatory Problems     Diagnosis Date Noted   • No Resolved Ambulatory Problems     Past Medical History:   Diagnosis Date   • Anxiety    • CHF (congestive heart failure) (CMS/[facility])    • Chronic pain    • COPD (chronic obstructive pulmonary disease) (CMS/[facility])    • Diabetes mellitus (CMS/[facility])    • GERD (gastroesophageal reflux disease)    • Hyperlipidemia    • Hypertension    • Hypotension    • Insomnia    • Mood disorder (CMS/[facility])    • Renal failure    • Respiratory failure (CMS/[facility])          PAST SURGICAL HISTORY  History reviewed. No pertinent surgical history.      FAMILY HISTORY  History reviewed. No pertinent family history.      SOCIAL HISTORY  Social History     Socioeconomic History   • Marital status: Unknown     Spouse name: Not on file   • Number of children: Not on file   • Years of education: Not on file   • Highest education level: Not on file   Tobacco Use   • Smoking status: Unknown If Ever Smoked         ALLERGIES  Patient has no known allergies.        REVIEW  OF SYSTEMS  Review of Systems     All systems reviewed and negative except for those discussed in HPI.       PHYSICAL EXAM    I have reviewed the triage vital signs and nursing notes.    ED Triage Vitals [08/01/20 1206]   Temp Heart Rate Resp BP SpO2   -- 82 15 140/82 99 %      Temp src Heart Rate Source Patient Position BP Location FiO2 (%)   -- Monitor Sitting Right arm --       Physical Exam    Physical Exam   Constitutional: No distress.  Nonverbal, does not respond to verbal or tactile stimuli.  Does not follow exam requests.  HENT:  Head: Normocephalic and atraumatic.   Oropharynx: Mucous membranes are moist.   Eyes: No scleral icterus. No conjunctival pallor.  Pupils equal round reactive 3 to 4 mm.  Neck:  Neck supple.   Cardiovascular: Normal rate, regular rhythm and intact distal pulses.  Pulmonary/Chest: No respiratory distress.  Diminished breath sounds bilaterally.  Mild rhonchi bibasilar.    Abdominal: Soft. There is no tenderness. There is no rebound and no guarding.   Musculoskeletal: Withdrawals weakly from painful stimuli in all 4 extremities but seems to be slightly weaker on the right than the left.  Right upper extremity dialysis catheter with clamp in place.  neurological: Currently nonverbal and nonresponsive.  Withdraws weakly from painful stimuli in all 4 extremities though left seems stronger than right.  Skin: Skin is pink, warm, and dry. No pallor.   Psychiatric: Unable to assess  Nursing note and vitals reviewed.    LAB RESULTS  Recent Results (from the past 24 hour(s))   POC Glucose Once    Collection Time: 08/01/20 12:13 PM   Result Value Ref Range    Glucose 134 (H) 70 - 130 mg/dL   Comprehensive Metabolic Panel    Collection Time: 08/01/20 12:14 PM   Result Value Ref Range    Glucose 152 (H) 65 - 99 mg/dL    BUN 18 8 - 23 mg/dL    Creatinine 4.06 (H) 0.76 - 1.27 mg/dL    Sodium 136 136 - 145 mmol/L    Potassium 4.0 3.5 - 5.2 mmol/L    Chloride 96 (L) 98 - 107 mmol/L    CO2 28.0 22.0 -  29.0 mmol/L    Calcium 7.8 (L) 8.6 - 10.5 mg/dL    Total Protein 6.7 6.0 - 8.5 g/dL    Albumin 3.20 (L) 3.50 - 5.20 g/dL    ALT (SGPT) 15 1 - 41 U/L    AST (SGOT) 19 1 - 40 U/L    Alkaline Phosphatase 66 39 - 117 U/L    Total Bilirubin 1.0 0.0 - 1.2 mg/dL    eGFR Non African Amer 15 (L) >60 mL/min/1.73    Globulin 3.5 gm/dL    A/G Ratio 0.9 g/dL    BUN/Creatinine Ratio 4.4 (L) 7.0 - 25.0    Anion Gap 12.0 5.0 - 15.0 mmol/L   Protime-INR    Collection Time: 08/01/20 12:14 PM   Result Value Ref Range    Protime 13.9 11.7 - 14.2 Seconds    INR 1.08 0.90 - 1.10   aPTT    Collection Time: 08/01/20 12:14 PM   Result Value Ref Range    PTT 25.0 22.7 - 35.4 seconds   Troponin    Collection Time: 08/01/20 12:14 PM   Result Value Ref Range    Troponin T 0.153 (C) 0.000 - 0.030 ng/mL   Type & Screen    Collection Time: 08/01/20 12:14 PM   Result Value Ref Range    ABO Type O     RH type Positive     Antibody Screen Negative     T&S Expiration Date 8/4/2020 11:59:59 PM    Light Blue Top    Collection Time: 08/01/20 12:14 PM   Result Value Ref Range    Extra Tube hold for add-on    Green Top (Gel)    Collection Time: 08/01/20 12:14 PM   Result Value Ref Range    Extra Tube Hold for add-ons.    Lavender Top    Collection Time: 08/01/20 12:14 PM   Result Value Ref Range    Extra Tube hold for add-on    Gold Top - SST    Collection Time: 08/01/20 12:14 PM   Result Value Ref Range    Extra Tube Hold for add-ons.    CBC Auto Differential    Collection Time: 08/01/20 12:14 PM   Result Value Ref Range    WBC 7.82 3.40 - 10.80 10*3/mm3    RBC 3.60 (L) 4.14 - 5.80 10*6/mm3    Hemoglobin 10.5 (L) 13.0 - 17.7 g/dL    Hematocrit 33.9 (L) 37.5 - 51.0 %    MCV 94.2 79.0 - 97.0 fL    MCH 29.2 26.6 - 33.0 pg    MCHC 31.0 (L) 31.5 - 35.7 g/dL    RDW 15.6 (H) 12.3 - 15.4 %    RDW-SD 54.6 (H) 37.0 - 54.0 fl    MPV 12.0 6.0 - 12.0 fL    Platelets 94 (L) 140 - 450 10*3/mm3    Neutrophil % 77.4 (H) 42.7 - 76.0 %    Lymphocyte % 6.8 (L) 19.6 - 45.3 %      Monocyte % 12.7 (H) 5.0 - 12.0 %    Eosinophil % 0.9 0.3 - 6.2 %    Basophil % 0.9 0.0 - 1.5 %    Immature Grans % 1.3 (H) 0.0 - 0.5 %    Neutrophils, Absolute 6.06 1.70 - 7.00 10*3/mm3    Lymphocytes, Absolute 0.53 (L) 0.70 - 3.10 10*3/mm3    Monocytes, Absolute 0.99 (H) 0.10 - 0.90 10*3/mm3    Eosinophils, Absolute 0.07 0.00 - 0.40 10*3/mm3    Basophils, Absolute 0.07 0.00 - 0.20 10*3/mm3    Immature Grans, Absolute 0.10 (H) 0.00 - 0.05 10*3/mm3    nRBC 0.0 0.0 - 0.2 /100 WBC   Magnesium    Collection Time: 08/01/20 12:14 PM   Result Value Ref Range    Magnesium 1.8 1.6 - 2.4 mg/dL   Acetaminophen Level    Collection Time: 08/01/20 12:14 PM   Result Value Ref Range    Acetaminophen <5.0 (L) 10.0 - 30.0 mcg/mL   Ethanol    Collection Time: 08/01/20 12:14 PM   Result Value Ref Range    Ethanol <10 0 - 10 mg/dL    Ethanol % <0.010 %   Salicylate Level    Collection Time: 08/01/20 12:14 PM   Result Value Ref Range    Salicylate <0.3 <=30.0 mg/dL   Ammonia    Collection Time: 08/01/20  1:07 PM   Result Value Ref Range    Ammonia 64 (H) 16 - 60 umol/L   Respiratory Panel PCR w/COVID-19(SARS-CoV-2) ARIELA/SNEHA/[facility] In-House, NP Swab in [facility], 8-12 Hr TAT - Swab, Nasopharynx    Collection Time: 08/01/20  1:50 PM   Result Value Ref Range    ADENOVIRUS, PCR Not Detected Not Detected    Coronavirus 229E Not Detected Not Detected    Coronavirus HKU1 Not Detected Not Detected    Coronavirus NL63 Not Detected Not Detected    Coronavirus OC43 Not Detected Not Detected    COVID19 Not Detected Not Detected - Ref. Range    Human Metapneumovirus Not Detected Not Detected    Human Rhinovirus/Enterovirus Not Detected Not Detected    Influenza A PCR Not Detected Not Detected    Influenza A H1 Not Detected Not Detected    Influenza A H1 2009 PCR Not Detected Not Detected    Influenza A H3 Not Detected Not Detected    Influenza B PCR Not Detected Not Detected    Parainfluenza Virus 1 Not Detected Not Detected    Parainfluenza Virus 2 Not  Detected Not Detected    Parainfluenza Virus 3 Not Detected Not Detected    Parainfluenza Virus 4 Not Detected Not Detected    RSV, PCR Not Detected Not Detected    Bordetella pertussis pcr Not Detected Not Detected    Bordetella parapertussis PCR Not Detected Not Detected    Chlamydophila pneumoniae PCR Not Detected Not Detected    Mycoplasma pneumo by PCR Not Detected Not Detected   Urine Drug Screen - Urine, Catheter    Collection Time: 08/01/20  2:01 PM   Result Value Ref Range    Amphet/Methamphet, Screen Negative Negative    Barbiturates Screen, Urine Negative Negative    Benzodiazepine Screen, Urine Negative Negative    Cocaine Screen, Urine Negative Negative    Opiate Screen Negative Negative    THC, Screen, Urine Negative Negative    Methadone Screen, Urine Negative Negative    Oxycodone Screen, Urine Negative Negative   Urinalysis With Microscopic If Indicated (No Culture) - Urine, Catheter    Collection Time: 08/01/20  2:01 PM   Result Value Ref Range    Color, UA Yellow Yellow, Straw    Appearance, UA Clear Clear    pH, UA 8.0 5.0 - 8.0    Specific Gravity, UA 1.015 1.005 - 1.030    Glucose,  mg/dL (1+) (A) Negative    Ketones, UA Negative Negative    Bilirubin, UA Negative Negative    Blood, UA Small (1+) (A) Negative    Protein, UA >=300 mg/dL (3+) (A) Negative    Leuk Esterase, UA Moderate (2+) (A) Negative    Nitrite, UA Negative Negative    Urobilinogen, UA 0.2 E.U./dL 0.2 - 1.0 E.U./dL   Urinalysis, Microscopic Only - Urine, Catheter    Collection Time: 08/01/20  2:01 PM   Result Value Ref Range    RBC, UA 6-12 (A) None Seen, 0-2 /HPF    WBC, UA 13-20 (A) None Seen, 0-2 /HPF    Bacteria, UA None Seen None Seen /HPF    Squamous Epithelial Cells, UA 3-6 (A) None Seen, 0-2 /HPF    Hyaline Casts, UA None Seen None Seen /LPF    Methodology Manual Light Microscopy        Ordered the above labs and independently reviewed the results.        RADIOLOGY  Ct Angiogram Head    Result Date: 8/1/2020  CT  ANGIOGRAM HEAD AND NECK AND CT PERFUSION STUDY  CLINICAL HISTORY: Altered mental status.  Radiation dose reduction techniques were utilized, including automated exposure control and exposure modulation based on body size. CT scan of the head was obtained with 3 mm axial images without the use of IV contrast. The patient underwent a CT perfusion study with a dynamic bolus of IV contrast. Standard perfusion maps were constructed. The patient then underwent a CT angiogram of the head and neck with 1 mm axial images following the administration of IV contrast. Sagittal, coronal, and 3-dimensional reconstructed images were obtained.  Percent stenosis was assessed in accordance with NASCET criteria.  FINDINGS:  NONCONTRAST HEAD CT: On the noncontrast head CT, a 3-4 mm hyperdense focus at the midbrain, for example axial image 17 could be a small focus of blood or vascular anomaly, further evaluation with MRI may be helpful, close interval follow-up can characterize change. Moderate periventricular hypodensities suggest chronic small vessel ischemic change in a patient this age. Arterial calcifications are seen at the base the brain.  No enhancing lesions of brain are noted following contrast material administration.   CT PERFUSION STUDY:  No CBF (under 30%) deficit or perfusion abnormality is demonstrated where the T MAX is greater than 6 seconds. The calculated mismatch volume was 0 cc.  CTA HEAD and neck: The CT angiogram of the head and neck demonstrates calcifications in the bilateral cervical and intracranial carotid arteries, with estimated 65% narrowing at the cavernous segment of the right ICA, 75% narrowing at the origin of the cavernous segment of the left ICA otherwise without high-grade stenosis (0-49% stenosis). About 20% narrowing at the origin of the left cervical ICA. Otherwise, no hemodynamically significant focal stenosis, aneurysm, or dissection in the cervical carotid or vertebral arteries, or in the  arteries at the base of the brain. The left P1 segment is diminutive, the left PCA being at least partially supplied by the anterior circulation. The lower aspect of the left vertebral artery is obscured by attenuation artifact from densely opacified left subclavian vein, precluding its assessment.  Lobulated mucous versus mucosal lesion measuring 1.7 cm in the posterior right aspect of the trachea at the level of the thoracic inlet, axial image 65, follow-up or direct visualization recommended.  Cervical spondyloarthropathy is present with uncovertebral joint and facet hypertrophy result in bilateral neuroforaminal narrowing, more prominent on the right at C4/5, C5/6, and on the left at C3/4, C5/6.          1. No perfusion abnormality to suggest completed or threatened acute infarct. 2. No arterial cutoff. Narrowing at the bilateral ICA cavernous segments, estimated at 65% on the right, 75% on the left. 3. A 3 to 4 mm hyperdense focus at the mid brain remains indeterminate, could be a small focus of hemorrhage or vascular abnormality. Close follow-up/further evaluation recommended. 4. Possible mucosal lesion in the trachea versus lobulated mucus.   The findings were discussed by telephone with Dr. Sanchez at 1235, 1248, 08/01/2020  This report was finalized on 8/1/2020 12:56 PM by Dr. Jayjay Corbett M.D.      Ct Angiogram Neck    Result Date: 8/1/2020  CT ANGIOGRAM HEAD AND NECK AND CT PERFUSION STUDY  CLINICAL HISTORY: Altered mental status.  Radiation dose reduction techniques were utilized, including automated exposure control and exposure modulation based on body size. CT scan of the head was obtained with 3 mm axial images without the use of IV contrast. The patient underwent a CT perfusion study with a dynamic bolus of IV contrast. Standard perfusion maps were constructed. The patient then underwent a CT angiogram of the head and neck with 1 mm axial images following the administration of IV contrast.  Sagittal, coronal, and 3-dimensional reconstructed images were obtained.  Percent stenosis was assessed in accordance with NASCET criteria.  FINDINGS:  NONCONTRAST HEAD CT: On the noncontrast head CT, a 3-4 mm hyperdense focus at the midbrain, for example axial image 17 could be a small focus of blood or vascular anomaly, further evaluation with MRI may be helpful, close interval follow-up can characterize change. Moderate periventricular hypodensities suggest chronic small vessel ischemic change in a patient this age. Arterial calcifications are seen at the base the brain.  No enhancing lesions of brain are noted following contrast material administration.   CT PERFUSION STUDY:  No CBF (under 30%) deficit or perfusion abnormality is demonstrated where the T MAX is greater than 6 seconds. The calculated mismatch volume was 0 cc.  CTA HEAD and neck: The CT angiogram of the head and neck demonstrates calcifications in the bilateral cervical and intracranial carotid arteries, with estimated 65% narrowing at the cavernous segment of the right ICA, 75% narrowing at the origin of the cavernous segment of the left ICA otherwise without high-grade stenosis (0-49% stenosis). About 20% narrowing at the origin of the left cervical ICA. Otherwise, no hemodynamically significant focal stenosis, aneurysm, or dissection in the cervical carotid or vertebral arteries, or in the arteries at the base of the brain. The left P1 segment is diminutive, the left PCA being at least partially supplied by the anterior circulation. The lower aspect of the left vertebral artery is obscured by attenuation artifact from densely opacified left subclavian vein, precluding its assessment.  Lobulated mucous versus mucosal lesion measuring 1.7 cm in the posterior right aspect of the trachea at the level of the thoracic inlet, axial image 65, follow-up or direct visualization recommended.  Cervical spondyloarthropathy is present with uncovertebral  joint and facet hypertrophy result in bilateral neuroforaminal narrowing, more prominent on the right at C4/5, C5/6, and on the left at C3/4, C5/6.          1. No perfusion abnormality to suggest completed or threatened acute infarct. 2. No arterial cutoff. Narrowing at the bilateral ICA cavernous segments, estimated at 65% on the right, 75% on the left. 3. A 3 to 4 mm hyperdense focus at the mid brain remains indeterminate, could be a small focus of hemorrhage or vascular abnormality. Close follow-up/further evaluation recommended. 4. Possible mucosal lesion in the trachea versus lobulated mucus.   The findings were discussed by telephone with Dr. Sanchez at 1235, 1248, 08/01/2020  This report was finalized on 8/1/2020 12:56 PM by Dr. Jayjay Corbett M.D.      Mri Brain Without Contrast    Result Date: 8/1/2020  MRI BRAIN WO CONTRAST-  INDICATIONS: Altered mental status, possible hemorrhage  TECHNIQUE: Multiplanar multisequence noncontrast magnetic resonance imaging of the brain.  COMPARISON: CT from 08/01/2020  FINDINGS:  Several sequences are significantly degraded by motion artifact, limiting the assessment.  The diffusion-weighted images show no restricted diffusion to suggest acute infarct.  The midline structures appear unremarkable.  The brain parenchyma shows moderate periventricular white matter T2 FLAIR signal hyperintensities suggesting chronic small vessel ischemic change in a patient this age.  Flow voids in the major arteries at the base of the brain appear unremarkable. Left vertebral artery is dominant.  The paranasal sinuses, mastoid air cells, and orbits appear unremarkable.       No acute infarct. No findings to suggest hemorrhage at the midbrain, although the exam is somewhat limited by motion artifact; the CT density of concern on the earlier CT may represent vascular anomaly. Follow-up evaluation with enhanced MRI may be helpful when/if possible, and close follow-up CT could be obtained to  ensure stability of the CT finding of concern.  This report was finalized on 8/1/2020 3:43 PM by Dr. Jayjay Corbett M.D.      Xr Chest 1 View    Result Date: 8/1/2020  XR CHEST 1 VW-  HISTORY: Male who is 75 years-old,  stroke  TECHNIQUE: Frontal view of the chest  COMPARISON: None available  FINDINGS: Heart is mildly enlarged. Mild prominence of vascular and interstitial markings. Linear likely atelectasis or scarring the left midlung. No pleural effusion or pneumothorax. No acute osseous process.      Small likely atelectasis or scarring. Follow-up as clinically indicated.  This report was finalized on 8/1/2020 1:34 PM by Dr. Jayjay Corbett M.D.      Ct Cerebral Perfusion With & Without Contrast    Result Date: 8/1/2020  CT ANGIOGRAM HEAD AND NECK AND CT PERFUSION STUDY  CLINICAL HISTORY: Altered mental status.  Radiation dose reduction techniques were utilized, including automated exposure control and exposure modulation based on body size. CT scan of the head was obtained with 3 mm axial images without the use of IV contrast. The patient underwent a CT perfusion study with a dynamic bolus of IV contrast. Standard perfusion maps were constructed. The patient then underwent a CT angiogram of the head and neck with 1 mm axial images following the administration of IV contrast. Sagittal, coronal, and 3-dimensional reconstructed images were obtained.  Percent stenosis was assessed in accordance with NASCET criteria.  FINDINGS:  NONCONTRAST HEAD CT: On the noncontrast head CT, a 3-4 mm hyperdense focus at the midbrain, for example axial image 17 could be a small focus of blood or vascular anomaly, further evaluation with MRI may be helpful, close interval follow-up can characterize change. Moderate periventricular hypodensities suggest chronic small vessel ischemic change in a patient this age. Arterial calcifications are seen at the base the brain.  No enhancing lesions of brain are noted following contrast  material administration.   CT PERFUSION STUDY:  No CBF (under 30%) deficit or perfusion abnormality is demonstrated where the T MAX is greater than 6 seconds. The calculated mismatch volume was 0 cc.  CTA HEAD and neck: The CT angiogram of the head and neck demonstrates calcifications in the bilateral cervical and intracranial carotid arteries, with estimated 65% narrowing at the cavernous segment of the right ICA, 75% narrowing at the origin of the cavernous segment of the left ICA otherwise without high-grade stenosis (0-49% stenosis). About 20% narrowing at the origin of the left cervical ICA. Otherwise, no hemodynamically significant focal stenosis, aneurysm, or dissection in the cervical carotid or vertebral arteries, or in the arteries at the base of the brain. The left P1 segment is diminutive, the left PCA being at least partially supplied by the anterior circulation. The lower aspect of the left vertebral artery is obscured by attenuation artifact from densely opacified left subclavian vein, precluding its assessment.  Lobulated mucous versus mucosal lesion measuring 1.7 cm in the posterior right aspect of the trachea at the level of the thoracic inlet, axial image 65, follow-up or direct visualization recommended.  Cervical spondyloarthropathy is present with uncovertebral joint and facet hypertrophy result in bilateral neuroforaminal narrowing, more prominent on the right at C4/5, C5/6, and on the left at C3/4, C5/6.          1. No perfusion abnormality to suggest completed or threatened acute infarct. 2. No arterial cutoff. Narrowing at the bilateral ICA cavernous segments, estimated at 65% on the right, 75% on the left. 3. A 3 to 4 mm hyperdense focus at the mid brain remains indeterminate, could be a small focus of hemorrhage or vascular abnormality. Close follow-up/further evaluation recommended. 4. Possible mucosal lesion in the trachea versus lobulated mucus.   The findings were discussed by  telephone with Dr. Sanchez at 2823, 2406, 08/01/2020  This report was finalized on 8/1/2020 12:56 PM by Dr. Jayjay Corbett M.D.        I ordered the above noted radiological studies. Reviewed by me and discussed with radiologist.  See dictation for official radiology interpretation.      PROCEDURES    Procedures        MEDICATIONS GIVEN IN ER    Medications   sodium chloride 0.9 % flush 10 mL (has no administration in time range)   cefTRIAXone (ROCEPHIN) IVPB 1 g (has no administration in time range)   iopamidol (ISOVUE-300) 61 % injection 150 mL (150 mL Intravenous Given by Other 8/1/20 1226)   levETIRAcetam in NaCl 0.75% (KEPPRA) IVPB 1,000 mg (0 mg Intravenous Stopped 8/1/20 1401)         PROGRESS, DATA ANALYSIS, CONSULTS, AND MEDICAL DECISION MAKING    My differential diagnosis for altered mental status includes but is not limited to:  Hypoglycemia, hyperglycemia, DKA, overdose, ethanol intoxication, thiamine deficiency, niacin deficiency, hypothymia, hyperviscosity, Jovan’s disease, hyponatremia, hypernatremia, liver failure, kidney failure, hyper or hypothyroid, no insufficiency, hypoxia, hypercarbia, carbon monoxide poisoning, postanoxic encephalopathy, ischemic stroke, intracranial bleed, subarachnoid hemorrhage, brain tumor, closed head injury, epidural hematoma, epidural hematoma, seizure activity, postictal state, syncopal episode, disseminated encephalomyelitis, central pontine myelinolysis, post cardiac arrest, bacterial meningitis, viral meningitis, fungal meningitis, encephalitis, brain abscess, subdural empyema, hysteria, catatonic state, malingering, hypertensive encephalopathy, vasculitis, TTP, DIC    Total critical care time: Approximately 35 minutes    Due to a high probability of clinically significant, life threatening deterioration, the patient required my highest level of preparedness to intervene emergently and I personally spent this critical care time directly and personally managing  the patient. This critical care time included obtaining a history; examining the patient; vital sign monitoring; ordering and review of studies; arranging urgent treatment with development of a management plan; evaluation of patient's response to treatment; frequent reassessment; and, discussions with other providers.    This critical care time was performed to assess and manage the high probability of imminent, life-threatening deterioration that could result in multi-organ failure. It was exclusive of separately billable procedures and treating other patients and teaching time.    Please see MDM section and the rest of the note for further information on patient assessment and treatment.      All labs have been independently reviewed by me.  All radiology studies have been reviewed by me and discussed with radiologist dictating the report.   EKG's independently viewed and interpreted by me.  Discussion below represents my analysis of pertinent findings related to patient's condition, differential diagnosis, treatment plan and final disposition.      ED Course as of Aug 01 1706   Sat Aug 01, 2020   1218 Team D called.  Limited history of this case was discussed with stroke neurologist, Dr. Avelar.  Move forward with CTA head neck and perfusion.    [RS]   1218 Per nursing home report last known normal was 0610 when he left the nursing facility to go to dialysis.  Baseline is reportedly alert and oriented with walking and talking.    [RS]   1240 Dialysis nursing report-last known normal 0730    [RS]   1249 EKG           EKG time: 1247  Rhythm/Rate: Sinus, 90  P waves and ME: TYRONE within normal limits with prominent P wave  QRS, axis: Narrow QRS complex with slow R wave progression  ST and T waves: No STEMI; lateral T wave inversions with mildly prolonged QT    Interpreted Contemporaneously by me, independently viewed  Comparison: Unavailable      [RS]   1250 Critical troponin noted in this nonresponsive patient who  cannot give me any history who is also a dialysis patient with likely chronically elevated creatinine.  No STEMI on EKG.    [RS]   1318 T result reviewed with stroke neurology Dr. CHERY  Recommend stat MRI and Keppra IV.    [RS]   1325 Long discussion about the patient's current status, concerns about his future as well as his recent statements.  She notes the patient had tried to give up dialysis, has frequently expressed being tired and desiring not to continue further life-saving measures.  She is in agreement with the MRI today but would like to make him DNR/DNI.  She does request call back with MRI results decide whether she would like for him to go direct to palliative care versus further medical treatment depending on diagnosis.    [RS]   1421 Patient did reportedly have a verbal interaction with the nurses during catheterization.  It was brief and he has not set anything since.    [RS]   1559 MRI result reviewed with stroke neurology.  Recommends 24-hour observation to see if he wakes for possible seizure.  Patient sister updated with laboratory, radiologic and MRI results.  She is agreeable plan for admission to the regular hospital to evaluate for possibility of improvement and then can decide about palliative care thereafter.    [RS]   1640 Patient reviewed with Dr. Stevenson, A is agreeable except the patient for admission.    [RS]   1706 Ceftriaxone added for abnormal urine.  Urine culture ordered.    [RS]      ED Course User Index  [RS] Shantanu Funes MD       AS OF 17:06 VITALS:    BP - 139/79  HR - 86  TEMP - 96.1 °F (35.6 °C) (Tympanic)  O2 SATS - 98%        DIAGNOSIS  Final diagnoses:   Acute metabolic encephalopathy   ESRD (end stage renal disease) on dialysis (CMS/[facility])   Elevated troponin   Acute UTI         DISPOSITION  ADMISSION    Discussed treatment plan and reason for admission with pt/family and admitting physician.  Pt/family voiced understanding of the plan for admission for further  "testing/treatment as needed.            Shantanu Funes MD  08/01/20 1706       Shantanu Funes MD  08/01/20 1820      Electronically signed by Shantanu Funes MD at 08/01/20 1820     Shantell Mercer, RN at 08/01/20 1240        Confirmed with [facility] that pt's last known normal was 007:30 this morning     Shantell Mercer, RN  08/01/20 1241      Electronically signed by Shantell Mercer, RN at 08/01/20 1241     Brynn Paez RN at 08/01/20 1347        Pt wearing mask throughout encouter. This RN wearing mask and goggles throughout encounter.       Brynn Paez RN  08/01/20 1347      Electronically signed by Brynn Paez RN at 08/01/20 1347     Samira Terrazas RN at 08/01/20 1847        Pt stating \"I need to go to the bathroom\". Pt had BM. Pt cleansed and brief changed.      aSmira Terrazas RN  08/01/20 1848      Electronically signed by Samira Terrazas RN at 08/01/20 1848       {Outbreak/Travel/Exposure Documentation......;  Question Available Choices Patient Response   Outbreak Screen: Do you currently have a new onset of the following symptoms?        Fever/Chils, Cough, Shortness of air, Loss of taste or smell, No, Unknown  (!) Cough (08/01/20 1205)   Outbreak Screen: In the last 14 days, have you had contact with anyone who is ill, has show any of the symptoms listed above and/or has been diagnosis with the 2019 Novel Coronavirus? This includes any immediate household members but excludes any patients with whom you have been in contact within your normal work duties wearing proper PPE, if you are a healthcare worker.  Yes, No, Unknown              No (08/01/20 1205)   Outbreak Screen: Who was notified?    Free text  (not recorded)   Travel Screen: Have you traveled in the last month? If so, to what country have you traveled? If [place] what state? Yes, No, Unknown  List of all countries  List of all States No (08/01/20 1205)  (not recorded)  (not recorded)   Infection Risk: Do you currently " have the following symptoms?  (If cough is selected, the Tuberculosis Screen is performed.) Cough, Fever, Rash, No (!) Cough (08/01/20 1205)   Tuberculosis Screen: Do you have any of the following Tuberculosis Risks?  · Have you lived or spent time with anyone who had or may have TB?  · Have you lived in or visited any of the following areas for more than one month: [place], [place], [place], [place] or [place], the [place] or [place]?  · Do you have HIV/AIDS?  · Have you lived in or worked in a nursing home, homeless shelter, correctional facility, or substance abuse treatment facility?   · No    If Yes do you have any of the following symptoms? Yes responses display to the right    If Yes, symptoms listed are:  Cough greater than or equal to 3 weeks, Loss of appetite, Unexplained weight loss, Night sweats, Bloody sputum or hemoptysis, Hoarseness, Fever, Fatigue, Chest pain, No No (08/01/20 1205)  (not recorded)   Exposure Screen: Have you been exposed to any of these contagious diseases in the last month? Measles, Chickenpox, Meningitis, Pertussis, Whooping Cough, No No (08/01/20 1205)       Lines, Drains & Airways    Active LDAs     Name:   Placement date:   Placement time:   Site:   Days:    Peripheral IV 08/01/20 Left Antecubital   08/01/20    --    Antecubital   2                Current Facility-Administered Medications   Medication Dose Route Frequency Provider Last Rate Last Dose   • acetaminophen (TYLENOL) tablet 650 mg  650 mg Oral Q4H PRN Rainer Stevenson MD        Or   • acetaminophen (TYLENOL) 160 MG/5ML solution 650 mg  650 mg Oral Q4H PRN Rainer Stevenson MD        Or   • acetaminophen (TYLENOL) suppository 650 mg  650 mg Rectal Q4H PRN Rainer Stevenson MD       • cefTRIAXone (ROCEPHIN) IVPB 1 g  1 g Intravenous Q24H Rainer Stevenson  mL/hr at 08/02/20 2152 1 g at 08/02/20 2152   • haloperidol lactate (HALDOL) injection 2 mg  2 mg Intravenous Q2H PRN Naveen Olivares MD       • LORazepam (ATIVAN)  injection 1 mg  1 mg Intravenous Q2H PRN Naveen Olivares MD   1 mg at 08/03/20 0540   • nitroglycerin (NITROSTAT) SL tablet 0.4 mg  0.4 mg Sublingual Q5 Min PRN Rainer Stevenson MD       • ondansetron (ZOFRAN) injection 4 mg  4 mg Intravenous Q6H PRN Rainer Stevenson MD       • sodium chloride 0.9 % flush 10 mL  10 mL Intravenous PRN Rainer Stevenson MD       • sodium chloride 0.9 % flush 10 mL  10 mL Intravenous Q12H Rainer Stevenson MD   10 mL at 08/03/20 0959   • sodium chloride 0.9 % flush 10 mL  10 mL Intravenous PRN Rainer Stevenson MD               Lab Results (last 24 hours)     Procedure Component Value Units Date/Time    Renal Function Panel [[id number]]  (Abnormal) Collected:  08/03/20 0940    Specimen:  Blood Updated:  08/03/20 1031     Glucose 149 mg/dL      BUN 38 mg/dL      Creatinine 7.16 mg/dL      Sodium 131 mmol/L      Potassium 4.2 mmol/L      Chloride 94 mmol/L      CO2 24.1 mmol/L      Calcium 8.3 mg/dL      Albumin 3.00 g/dL      Phosphorus 6.8 mg/dL      Anion Gap 12.9 mmol/L      BUN/Creatinine Ratio 5.3     eGFR Non African Amer 8 mL/min/1.73      Comment: <15 Indicative of kidney failure.        eGFR   Amer --     Comment: <15 Indicative of kidney failure.       Narrative:       GFR Normal >60  Chronic Kidney Disease <60  Kidney Failure <15      Magnesium [[id number]]  (Normal) Collected:  08/03/20 0940    Specimen:  Blood Updated:  08/03/20 1031     Magnesium 2.0 mg/dL     CBC & Differential [[id number]] Collected:  08/03/20 0940    Specimen:  Blood Updated:  08/03/20 0959    Narrative:       The following orders were created for panel order CBC & Differential.  Procedure                               Abnormality         Status                     ---------                               -----------         ------                     CBC Auto Differential[[id number]]                            In process                   Please view results for these tests on the individual orders.    CBC Auto  Differential [[id number]] Collected:  08/03/20 0940    Specimen:  Blood Updated:  08/03/20 0959    POC Creatinine [[id number]]  (Abnormal) Collected:  08/01/20 1229    Specimen:  Blood Updated:  08/03/20 0746     Creatinine 4.00 mg/dL      Comment: Serial Number: [id number]:  [id number]       POC Glucose Once [[id number]]  (Abnormal) Collected:  08/03/20 0731    Specimen:  Blood Updated:  08/03/20 0732     Glucose 153 mg/dL     POC Glucose Once [[id number]]  (Normal) Collected:  08/02/20 2043    Specimen:  Blood Updated:  08/02/20 2044     Glucose 124 mg/dL     Urine Culture - Urine, Urine, Catheter [[id number]]  (Normal) Collected:  08/01/20 1401    Specimen:  Urine, Catheter Updated:  08/02/20 1612     Urine Culture No growth            Orders (active)      Start     Ordered    08/03/20 0702  Inpatient Psychiatrist Consult  IN AM     Specialty:  Psychiatry  Provider:  Luis F Myers III, MD    08/02/20 1638    08/03/20 0600  CBC & Differential  Morning Draw      08/02/20 1502    08/02/20 1505  haloperidol lactate (HALDOL) injection 2 mg  Every 2 Hours PRN     Note to Pharmacy:  Max of 6 mg in 24 hours    08/02/20 1507    08/02/20 1505  LORazepam (ATIVAN) injection 1 mg  Every 2 Hours PRN     Note to Pharmacy:  Max of 4 mg total in 24 hours, monitor respirations    08/02/20 1507    08/02/20 0125  Restraints Non-Violent or Non-Self Destructive  Calendar Day      08/02/20 0125    08/02/20 0034  SLP Consult: Eval & Treat Other; md order to evaluate  Once      08/02/20 0034    08/01/20 2100  cefTRIAXone (ROCEPHIN) IVPB 1 g  Every 24 Hours      08/01/20 1915 08/01/20 2100  sodium chloride 0.9 % flush 10 mL  Every 12 Hours Scheduled      08/01/20 1915 08/01/20 2000  Vital Signs  Every 4 Hours      08/01/20 1915 08/01/20 1914  ondansetron (ZOFRAN) injection 4 mg  Every 6 Hours PRN      08/01/20 1915 08/01/20 1913  acetaminophen (TYLENOL) tablet 650 mg  Every 4 Hours PRN      08/01/20 1915 08/01/20  1913  acetaminophen (TYLENOL) 160 MG/5ML solution 650 mg  Every 4 Hours PRN      08/01/20 1915 08/01/20 1913  acetaminophen (TYLENOL) suppository 650 mg  Every 4 Hours PRN      08/01/20 1915 08/01/20 1913  NPO Diet  Diet Effective Now      08/01/20 1915 08/01/20 1912  Intake & Output  Every Shift      08/01/20 1915 08/01/20 1912  Weigh Patient  Once      08/01/20 1915 08/01/20 1912  Oxygen Therapy- Nasal Cannula; Titrate for SPO2: 90% - 95%  Continuous      08/01/20 1915 08/01/20 1912  Insert Peripheral IV  Once      08/01/20 1915 08/01/20 1912  Maintain Sequential Compression Device  Continuous      08/01/20 1915 08/01/20 1912  Telemetry - Maintain IV Access  Continuous      08/01/20 1915 08/01/20 1912  Continuous Cardiac Monitoring  Continuous      08/01/20 1915 08/01/20 1912  May Be Off Telemetry for Tests  Continuous      08/01/20 1915 08/01/20 1912  ACLS Protocol For Life Threatening Dysrhythmias (Unless Code Status Indicates Otherwise)  Continuous      08/01/20 1915 08/01/20 1912  Notify Provider if ACLS Protocol Activated  Until Discontinued      08/01/20 1915 08/01/20 1911  nitroglycerin (NITROSTAT) SL tablet 0.4 mg  Every 5 Minutes PRN      08/01/20 1915 08/01/20 1911  sodium chloride 0.9 % flush 10 mL  As Needed      08/01/20 1915 08/01/20 1346  SLP Consult: Eval & Treat RN Dysphagia Screen Failed  Once      08/01/20 1345    08/01/20 1326  Code Status and Medical Interventions:  Continuous      08/01/20 1325    08/01/20 1214  Insert large-bore peripheral IV - Right AC preferred  Once      08/01/20 1213    08/01/20 1213  sodium chloride 0.9 % flush 10 mL  As Needed      08/01/20 1213    Unscheduled  Straight cath  As Needed      08/01/20 1336    Unscheduled  Telemetry - Pulse Oximetry  Continuous PRN     Comments:  If Patient Develops Unresponsiveness, Acute Dyspnea, Cyanosis or Suspected Hypoxemia Start Continuous Pulse Ox Monitoring, Apply Oxygen & Notify  Provider    20    Unscheduled  Oxygen Therapy- Nasal Cannula; Titrate for SPO2: 90% - 95%  Continuous PRN     Comments:  If Patient Develops Unresponsiveness, Acute Dyspnea, Cyanosis or Suspected Hypoxemia Start Continuous Pulse Ox Monitoring, Apply Oxygen & Notify Provider    20    Unscheduled  ECG 12 Lead  As Needed     Comments:  Nurse to Release if Patient Expericences Acute Chest Pain or Dysrhythmias    20    Unscheduled  Potassium  As Needed     Comments:  For Ventricular Arrhythmias      20    Unscheduled  Magnesium  As Needed     Comments:  For Ventricular Arrhythmias      20    Unscheduled  Troponin  As Needed     Comments:  For Chest Pain      20    Unscheduled  Digoxin Level  As Needed     Comments:  For Atrial Arrhythmias      20    Unscheduled  Blood Gas, Arterial  As Needed     Comments:  Per O2 PolicyNotify Physician      20                     Physician Progress Notes       Vince Otto MD at 20 1619                              [facility]     LOS: 1 day     Name: Lucien Avalos  Age: 75 y.o.  Sex: male  :  1945  MRN: [id number]         Primary Care Physician: Matilde Hi MD    NPO Diet    Subjective     Patient is seen at bedside, he is lying in bed.  He appears confused.    Objective   Temp:  [98 °F (36.7 °C)-98.5 °F (36.9 °C)] 98 °F (36.7 °C)  Heart Rate:  [] 115  Resp:  [16] 16  BP: (125-152)/(63-76) 133/67  SpO2:  [95 %-99 %] 95 %  on   ;   Device (Oxygen Therapy): room air  Body mass index is 24.76 kg/m².    Physical Exam    General:   Appears confused  Head and ENT: normocephalic and atraumatic.  Lungs: symmetric expansion and equal air entry bilaterally.  Heart: regular rate, rhythm, no murmurs.  Abdomen: soft, nontender, bowel sounds present.  Extremities:  No clubbing, cyanosis or edema.  Neurologic:   Unable to evaluate  Psychiatry:   Unable to evaluate  Skin:  Warm and no rash.    Reviewed medications and new clinical results        Results from last 7 days   Lab Units 08/02/20  0941 08/01/20  1214   WBC 10*3/mm3 5.19 7.82   HEMOGLOBIN g/dL 9.6* 10.5*   PLATELETS 10*3/mm3 90* 94*     Results from last 7 days   Lab Units 08/02/20  0941 08/01/20  1214   SODIUM mmol/L 130* 136   POTASSIUM mmol/L 3.7 4.0   CHLORIDE mmol/L 93* 96*   CO2 mmol/L 23.8 28.0   BUN mg/dL 29* 18   CREATININE mg/dL 5.62* 4.06*   CALCIUM mg/dL 8.1* 7.8*   GLUCOSE mg/dL 148* 152*     Lab Results   Component Value Date    ANIONGAP 13.2 08/02/2020     Glucose   Date/Time Value Ref Range Status   08/01/2020 1213 134 (H) 70 - 130 mg/dL Final     Hemoglobin A1C   Date Value Ref Range Status   08/02/2020 5.87 (H) 4.80 - 5.60 % Final     Estimated Creatinine Clearance: 11.9 mL/min (A) (by C-G formula based on SCr of 5.62 mg/dL (H)).    Lab Results   Component Value Date    COVID19 Not Detected 08/01/2020     Assessment/Plan   Active Hospital Problems    Diagnosis  POA   • **Unresponsive [R41.89]  Unknown   • Acute metabolic encephalopathy [G93.41]  Yes   • COPD (chronic obstructive pulmonary disease) (CMS/[facility]) [J44.9]  Yes   • Diabetes mellitus (CMS/[facility]) [E11.9]  Yes   • Hypertension [I10]  Unknown   • ESRD (end stage renal disease) (CMS/[facility]) [N18.6]  Yes   • UTI (urinary tract infection) [N39.0]  Unknown   • Elevated troponin [R79.89]  Unknown   • Anemia due to chronic kidney disease [N18.9, D63.1]  Unknown      Resolved Hospital Problems   No resolved problems to display.     75 y.o. male      Assessment and plan  1. Acute metabolic encephalopathy, continue to monitor for mental status changes.  Neurology evaluation has been requested.  2.  Increased agitation, patient currently in restraints for safety.  If he continues to worsen we will consider Psychiatry evaluation.  3.  Urinary tract infection, he has received Rocephin, follow urine cultures.  4.  End-stage renal disease,  continue dialysis per nephrology recommendations.  5.  COPD, chronic and stable.  6.  Hyponatremia, management per nephrology recommendations.  7.  Further plans based on hospital course.    Vince Otto MD   20  16:19    Electronically signed by Vince Otto MD at 20 1621          Consult Notes      Naveen Olivares MD at 20 1458      Consult Orders    1. Inpatient Neurology Consult General [[id number]] ordered by Rainer Stevenson MD at 202                  Patient Identification:  NAME:  Lucien Avalos  Age:  75 y.o.   Sex:  male   :  1945   MRN:  [id number]       Chief complaint: He does not have one, reason for consult unresponsiveness followed by extreme agitation    History of present illness: Patient is a 75-year-old right-handed white male with a history of hypertension diabetes GERD and end-stage renal disease he has had dialysis following this he had a syncopal episode associated symptoms he had low blood pressure.  Since then he has returned to normal has however been very agitated at times he does not appear to be hallucinating but just is mean and cursing at people using racial slurs etc..  Modifying factors so far nothing has worked to stop this behavior.  Context patient who does take Restoril and Norco at home.  Locations not pertinent duration is been over the last 24 hours  PPE used at all time I had a mask on gloves goggles down and I had him wear a mask as well.  No aerosolization sat was not within 6 feet of him for longer than 10 minutes during the exam        Past surgical history:  History reviewed. No pertinent surgical history.    Allergies:  Patient has no known allergies.      Hospital medications:    cefTRIAXone 1 g Intravenous Q24H   sodium chloride 10 mL Intravenous Q12H       dextrose 5 % and sodium chloride 0.45 % 100 mL/hr Last Rate: 100 mL/hr (20)     •  acetaminophen **OR** acetaminophen **OR** acetaminophen  •  nitroglycerin  •   ondansetron  •  sodium chloride  •  sodium chloride    Review of systems:    He would not answer any of this no family is present I reviewed his chart fully    Objective:  Vitals Ranges:   Temp:  [98 °F (36.7 °C)-98.5 °F (36.9 °C)] 98 °F (36.7 °C)  Heart Rate:  [] 115  Resp:  [16] 16  BP: (125-152)/(63-79) 133/67      Physical Exam:  Despite being very agitated and mean earlier in the day he is now pleasant and states he wants to eat he is oriented x2 only he does not know where he is fund of knowledge poor attention span concentration good recent remote memory cannot be tested language I believe is normal he is not a phasic he counts fingers at 3 feet pupils 2 constricting to 1-1/2 unable to visualize disc retinas visual fields he would not follow face palate and tongue are symmetrical he would not follow other cranial nerve testing motor not a good effort but I believe it is 5- out of 5 x4 some distal atrophy but no fasciculations resting tremor reflexes absent throughout toes downgoing bilaterally sensation coordination station and gait completely impossible in this patient who began to argue more and more about when he can get something to eat heart is tachycardic without murmur neck supple without bruits extremities no clubbing cyanosis edema visual acuity he counts fingers at 3 feet    Results review:   I reviewed the patient's new clinical results.        Assessment and Plan:       Unresponsive    Acute metabolic encephalopathy    COPD (chronic obstructive pulmonary disease) (CMS/[facility])    Diabetes mellitus (CMS/H[facility])    Hypertension    ESRD (end stage renal disease) (CMS/[facility])    UTI (urinary tract infection)    Elevated troponin    Anemia due to chronic kidney disease  First of all, this patient has had unresponsiveness at the dialysis center at the completion of his dialysis with associated hypotension.  This diagnosis is pretty straightforward.  Nonetheless he has been extremely agitated cursing racial  "slurs telling new doctors that he is going to \"kick their ass\" etc..  For me he is pretty calm and is wanting something to eat and we will start him out with some applesauce.  His MRI scan by my independent eyeball review is normal with no acute stroke.  Although he has been on Restoril and as needed 5 mg Norco at home I do not think he is going through a drug withdrawal syndrome.  His QT interval is 465 and I will use PRN Ativan for his agitation.  If that is not so helpful I will use low-dose Haldol it is the friendly is QT interval neuroleptic there is and I do not believe there is any other class of medications that will help this gentleman.  He has metabolic encephalopathy on top of vascular dementia related to hypertension diabetes and chronic renal failure, all superimposed almost certainly on top of a personality disorder.      Naveen Olivares MD  08/02/20  14:58    Electronically signed by Naveen Olivares MD at 08/02/20 1632     Emory Mansfield MD at 08/02/20 1441      Consult Orders    1. Inpatient Nephrology Consult [[id number]] ordered by Rainer Stevenson MD at 08/01/20 1915                  Referring Provider: Dr. Rainer Stevenson  Reason for Consultation: ESRD    Subjective     Chief complaint   Chief Complaint   Patient presents with   • Altered Mental Status   • Hypotension       History of present illness:  76 yo WM with ESRD on HD TTS (reportedly [facility]; he identifies Dr. Donnell Reyes as his primary nephrologist), admitted yesterday following his HD treatment due to lethargy and decrease in responsiveness.  Renal consulted to provide HD.  Full PMH outlined below.  ROS not reliably obtainable as patient is confused, disoriented, and a bit confrontational.  He is in restraints; reportedly had been verbally abusive to staff throughout the evening yesterday and night.  · Reportedly had hypotension and change in mental status following his HD treatment yesterday, for which saline bolus given; " "cannot tell me where he receives HD and is not sure of his schedule (MWF versus TTS)  · No shortness of breath on room air lying flat; no chest pain; no leg swelling  · States that he had had abdominal pain that has since resolved; not aware of any diarrhea  · No fever    History reviewed. No pertinent surgical history.  History reviewed. No pertinent family history.      Allergies:  Patient has no known allergies.    Review of Systems  Not reliably obtainable; see HPI for pertinent information    Objective     Vital Signs  Temp:  [98 °F (36.7 °C)-98.5 °F (36.9 °C)] 98 °F (36.7 °C)  Heart Rate:  [] 115  Resp:  [16] 16  BP: (125-152)/(63-79) 133/67    Flowsheet Rows      First Filed Value   Admission Height  170.2 cm (67\") Documented at 08/01/2020 1247   Admission Weight  71.7 kg (158 lb 1.6 oz) Documented at 08/01/2020 1210           No intake/output data recorded.  I/O last 3 completed shifts:  In: 913.3 [I.V.:813.3; IV Piggyback:100]  Out: 800 [Urine:800]    Intake/Output Summary (Last 24 hours) at 8/2/2020 1441  Last data filed at 8/2/2020 0600  Gross per 24 hour   Intake 913.33 ml   Output 800 ml   Net 113.33 ml       Physical Exam:  NAD; pleasant but argumentative; not oriented; looks older than stated age  Chronically ill-appearing  MMM; temporal wasting; head is atraumatic   No eye discharge; no scleral icterus  No JVD; no carotid bruits  Coarse anteriorly; not labored  RR, tachycardic, no rub  Soft, NT, +D, BS+  No edema; poor muscle tone  +clubbing  No asterixis  Moves all extremities   Speech is fluent but disorganized  Irritable mood; odd affect    Results Review:  Results from last 7 days   Lab Units 08/02/20  0941 08/01/20  1214   SODIUM mmol/L 130* 136   POTASSIUM mmol/L 3.7 4.0   CHLORIDE mmol/L 93* 96*   CO2 mmol/L 23.8 28.0   BUN mg/dL 29* 18   CREATININE mg/dL 5.62* 4.06*   CALCIUM mg/dL 8.1* 7.8*   BILIRUBIN mg/dL 0.7 1.0   ALK PHOS U/L 59 66   ALT (SGPT) U/L 14 15   AST (SGOT) U/L 19 19   "   GLUCOSE mg/dL 148* 152*       Estimated Creatinine Clearance: 11.9 mL/min (A) (by C-G formula based on SCr of 5.62 mg/dL (H)).    Results from last 7 days   Lab Units 08/01/20  1214   MAGNESIUM mg/dL 1.8       Results from last 7 days   Lab Units 08/02/20  0941 08/01/20  1214   WBC 10*3/mm3 5.19 7.82   HEMOGLOBIN g/dL 9.6* 10.5*   PLATELETS 10*3/mm3 90* 94*       Results from last 7 days   Lab Units 08/01/20  1214   INR  1.08       Active Medications    cefTRIAXone 1 g Intravenous Q24H   sodium chloride 10 mL Intravenous Q12H       dextrose 5 % and sodium chloride 0.45 % 100 mL/hr Last Rate: 100 mL/hr (08/01/20 2152)       Assessment/Plan   Assessment  1.  ESRD: Central volume fine by exam, though chest x-ray yesterday did suggest some vascular prominence.  Hyponatremia on hypotonic fluids.  Potassium is normal; normal anion gap.  Last HD was yesterday  2.  Unresponsiveness: he is fully awake and is quite agitated.  Remains in restraints after being combative yesterday  3.  Possible UTI: urine is difficult to interpret in setting of ESRD and his meager urine output.  Only has 13-20 WBCs/hpf; urine culture remains NGTD   4.  Anemia of ESRD  5.  Thrombocytopenia  6.  DM2  7.  COPD  8.  Elevated troponin      Unresponsive    Acute metabolic encephalopathy    COPD (chronic obstructive pulmonary disease) (CMS/[facility])    Diabetes mellitus (CMS/[facility])    Hypertension    ESRD (end stage renal disease) (CMS/[facility])    UTI (urinary tract infection)    Elevated troponin    Anemia due to chronic kidney disease      Plan  1.  Continue HD on TTS schedule  2.  Stop IVF    I discussed the patient's findings and my recommendations with the patient and with Shantell, his nurse  Emory Mansfield MD  08/02/20  14:41              Electronically signed by Emory Mansfield MD at 08/02/20 1501         Wenceslao Kwong, RN   Registered Nurse      Plan of Care   Signed   Date of Service:  08/03/20 0408   Creation Time:  08/03/20 0408     "        Signed             Patient has rested the majority of the night without incidence. He has only screamed out 2x after I had already been in his room for several minutes and he wanted me to stay and talk with him. He went back to sleep without incidence. He did state \"don't knock me out\" several times. No other issues noted.                      Shantell Bishop, RN   Registered Nurse      Plan of Care   Signed   Date of Service:  08/02/20 1716   Creation Time:  08/02/20 1716            Signed             Bilateral soft wrist restraints continued to limit mobility. Pt is easily agitated and can become very angry, as well as being verbally abusive and threatens physical abuse at times. Orders for Ativan and Haldol PRN placed, Ativan give once and it did help to relax the pt. Pt's sister/POA mentioned his depressive mood and recent suicidal ideation, Psychiatry consult has been placed to see in AM. Still NPO and awaiting SLP eval, I left a voicemail for SLP but no response. Per Dr Olivares, pt can have applesauce to start. VSS. Will continue to monitor.                 Shantell Bishop, RN   Registered Nurse      Nursing Note   Signed   Date of Service:  08/02/20 1627   Creation Time:  08/02/20 1627            Signed             I spoke with pt's sister, who is his POA. She stated that the pt was sent to [facility] last weekend for suicidal ideation after he threated to hang himself with the call light at his SNF. She states that he is very depressed and she would like for him to be evaluated. I spoke with Dr. Olivares and will place a consult for Psychiatry. Will monitor.                 Gema Armstrong, RN   Registered Nurse      Plan of Care   Signed   Date of Service:  08/02/20 0503   Creation Time:  08/02/20 0503            Signed                Problem: Patient Care Overview  Goal: Plan of Care Review  Flowsheets (Taken 8/2/2020 8240)  Progress: improving  Plan of Care Reviewed With: patient  Outcome Summary: pt " admitted this shift for unresponsiveness, pt has been alert but confused since admission,pt extremly verbally abusive, yelling at staff with vulgar words, pt also stated he would 'beat his doctor up' while MD was at bedside, pt got out of bed, refused to get back in bed after redirectin from staff, used several curse words with staff, threatening this nurse with physical contact several times, this nurse got assistance from other staff and pt balled fist up and said 'get back or else', security was called, pt was asked to go back to bed for pt safety and asked to replace monitor leads as doctor ordered, pt declined, order given from MD for soft restraints, pt was placed back in bed with help from security officers and placed inmd ordered restraints, pt continued to yell profanities at staff and out in hallway, pt threw call light at staff and stated 'u give that thing back and i will throw it again' , pt call light placed by pt hand,  ,pt did not throw [place] light any additional times, spoke with sister and POA and she states that ths behavior was close to pt normal behavior but pt does not normally continue for hours such as he has here tonight, pt remains in restraints, pt offered bathroom privileges often, will continue to monitor

## 2020-08-03 NOTE — PROGRESS NOTES
[facility]               ASSOCIATES     LOS: 2 days     Name: Lucien Avalos  Age: 75 y.o.  Sex: male  :  1945  MRN: [id number]         Primary Care Physician: Matilde Hi MD    Diet Soft Texture; Ground; Thin    Subjective    Patient seen.    Objective   Temp:  [98 °F (36.7 °C)-98.5 °F (36.9 °C)] 98 °F (36.7 °C)  Heart Rate:  [84-95] 84  Resp:  [16-20] 17  BP: (142-159)/(68-86) 142/68  SpO2:  [90 %-94 %] 94 %  on   ;   Device (Oxygen Therapy): room air  Body mass index is 24.76 kg/m².    Physical Exam    General:   Appears confused  Head and ENT: normocephalic and atraumatic.  Lungs: symmetric expansion and equal air entry bilaterally.  Heart: regular rate, rhythm, no murmurs.  Abdomen: soft, nontender, bowel sounds present.  Extremities:  No clubbing, cyanosis or edema.  Neurologic:   Unable to evaluate  Psychiatry:  Unable to evaluate  Skin:  Warm and no rash.    Reviewed medications and new clinical results        Results from last 7 days   Lab Units 20  0940 20  0941 20  1214   WBC 10*3/mm3 6.79 5.19 7.82   HEMOGLOBIN g/dL 9.3* 9.6* 10.5*   PLATELETS 10*3/mm3 89* 90* 94*     Results from last 7 days   Lab Units 20  0940 20  0941 20  1229 20  1214   SODIUM mmol/L 131* 130*  --  136   POTASSIUM mmol/L 4.2 3.7  --  4.0   CHLORIDE mmol/L 94* 93*  --  96*   CO2 mmol/L 24.1 23.8  --  28.0   BUN mg/dL 38* 29*  --  18   CREATININE mg/dL 7.16* 5.62* 4.00* 4.06*   CALCIUM mg/dL 8.3* 8.1*  --  7.8*   GLUCOSE mg/dL 149* 148*  --  152*     Lab Results   Component Value Date    ANIONGAP 12.9 2020     Glucose   Date/Time Value Ref Range Status   2020 1628 161 (H) 70 - 130 mg/dL Final   2020 1101 180 (H) 70 - 130 mg/dL Final   2020 0731 153 (H) 70 - 130 mg/dL Final   2020 2043 124 70 - 130 mg/dL Final   2020 1213 134 (H) 70 - 130 mg/dL Final     Hemoglobin A1C   Date Value Ref Range Status   2020  5.87 (H) 4.80 - 5.60 % Final     Estimated Creatinine Clearance: 9.3 mL/min (A) (by C-G formula based on SCr of 7.16 mg/dL (H)).    Lab Results   Component Value Date    COVID19 Not Detected 08/01/2020     Assessment/Plan   Active Hospital Problems    Diagnosis  POA   • **Unresponsive [R41.89]  Unknown   • Acute metabolic encephalopathy [G93.41]  Yes   • COPD (chronic obstructive pulmonary disease) (CMS/[facility]) [J44.9]  Yes   • Diabetes mellitus (CMS/[facility]) [E11.9]  Yes   • Hypertension [I10]  Unknown   • ESRD (end stage renal disease) (CMS/[facility]) [N18.6]  Yes   • UTI (urinary tract infection) [N39.0]  Unknown   • Elevated troponin [R79.89]  Unknown   • Anemia due to chronic kidney disease [N18.9, D63.1]  Unknown      Resolved Hospital Problems   No resolved problems to display.     75 y.o. male    Assessment and plan  1. Acute metabolic encephalopathy, continue to monitor for mental status changes.  Neurology evaluation has been requested.  2.  Increased agitation, patient currently in restraints for safety.    3.  Urinary tract infection, he has received Rocephin, follow urine cultures.  4.  End-stage renal disease, continue dialysis per nephrology recommendations.  5.  COPD, chronic and stable.  6.  Hyponatremia, management per nephrology recommendations.  7.  Further plans based on hospital course.      Vince Otto MD   08/03/20  19:14

## 2020-08-03 NOTE — PLAN OF CARE
"Patient has rested the majority of the night without incidence. He has only screamed out 2x after I had already been in his room for several minutes and he wanted me to stay and talk with him. He went back to sleep without incidence. He did state \"don't knock me out\" several times. No other issues noted.      Problem: Patient Care Overview  Goal: Plan of Care Review  Outcome: Ongoing (interventions implemented as appropriate)  Flowsheets  Taken 8/2/2020 1716 by Shantell Bishop, RN  Progress: no change  Plan of Care Reviewed With: patient  Taken 8/2/2020 0454 by Gema Armstrong, RN  Outcome Summary: pt admitted this shift for unresponsiveness, pt has been alert but confused since admission,pt extremly verbally abusive, yelling at staff with cuss words, pt also stated he would beat his doctor up while MD was at bedside, pt got out of bed, refused to get back in bed after redirectin from staff, used several surse words with staff, threatening this nurse with physical contact several times, this nurse got assistance from other staff and pt balled fist up and said 'get back or else', security was called, pt was asked to go back to bed for pt safety and asked to replace monitor leads as doctor ordered, pt declined, order given from MD for restraints, pt was placed back in bed with help from security officers and placed inmd ordered restraints, pt continued to yell profanities at staff and out in hallway, pt threw call light at staff and stated 'u give that thing back and i will throw it again' , pt call light placed by pt hand,  spoke with sister and POA and she states that ths behavior was close to pt normal behavior but pt does not normally continue for hours suchas he has here tonight, pt remains in restraints, pt offred bathroom privileges often, will continue to monitor  Goal: Individualization and Mutuality  Outcome: Ongoing (interventions implemented as appropriate)  Goal: Discharge Needs Assessment  Outcome: Ongoing " (interventions implemented as appropriate)  Goal: Interprofessional Rounds/Family Conf  Outcome: Ongoing (interventions implemented as appropriate)     Problem: Fall Risk (Adult)  Goal: Identify Related Risk Factors and Signs and Symptoms  Outcome: Ongoing (interventions implemented as appropriate)  Goal: Absence of Fall  Outcome: Ongoing (interventions implemented as appropriate)     Problem: Restraint, Nonbehavioral (Nonviolent)  Goal: Rationale and Justification  Outcome: Ongoing (interventions implemented as appropriate)  Goal: Nonbehavioral (Nonviolent) Restraint: Absence of Injury/Harm  Outcome: Ongoing (interventions implemented as appropriate)  Goal: Nonbehavioral (Nonviolent) Restraint: Achievement of Discontinuation Criteria  Outcome: Ongoing (interventions implemented as appropriate)  Goal: Nonbehavioral (Nonviolent) Restraint: Preservation of Dignity and Wellbeing  Outcome: Ongoing (interventions implemented as appropriate)     Problem: Skin Injury Risk (Adult)  Goal: Identify Related Risk Factors and Signs and Symptoms  Outcome: Ongoing (interventions implemented as appropriate)  Goal: Skin Health and Integrity  Outcome: Ongoing (interventions implemented as appropriate)

## 2020-08-03 NOTE — CONSULTS
"IDENTIFYING INFORMATION: The patient is a 75-year-old white male admitted in transfer from a local nursing facility with recent mental status changes.  He has been found to have a urinary tract infection.    CHIEF COMPLAINT: None given    INFORMANT: Patient and chart    RELIABILITY: Fair    HISTORY OF PRESENT ILLNESS: The patient is a 75-year-old white male who is a resident at [facility].  He is dialysis dependent.  The patient was admitted on 8/1/2020 after he he was minimally responsive following hemodialysis.  As his hospital course progressed the patient became more agitated and threatening staff with physical violence.  When seen today the patient is pleasant and cooperative.  He states that he is seen a psychiatrist in the past for \"psychological problems\" but does not elaborate.  Family reports that he has a probable early dementia but no formal diagnosis is been made.  They have also reported that he does have a history of irritable behavior but is generally easily redirectable.    PAST PSYCHIATRIC HISTORY: As above    PAST MEDICAL HISTORY: Significant for end-stage renal disease, just of heart failure, COPD, diabetes mellitus, GERD, hypotension, hyperlipidemia    MEDICATIONS:   Current Facility-Administered Medications   Medication Dose Route Frequency Provider Last Rate Last Dose   • acetaminophen (TYLENOL) tablet 650 mg  650 mg Oral Q4H PRN Rainer Stevenson MD        Or   • acetaminophen (TYLENOL) 160 MG/5ML solution 650 mg  650 mg Oral Q4H PRN Rainer Stevenson MD        Or   • acetaminophen (TYLENOL) suppository 650 mg  650 mg Rectal Q4H PRN Rainer Stevenson MD       • cefTRIAXone (ROCEPHIN) IVPB 1 g  1 g Intravenous Q24H Rainer Stevenson  mL/hr at 08/02/20 2152 1 g at 08/02/20 2152   • nitroglycerin (NITROSTAT) SL tablet 0.4 mg  0.4 mg Sublingual Q5 Min PRN Rainer Stevenson MD       • OLANZapine (zyPREXA) injection 5 mg  5 mg Intramuscular Q8H PRN Luis F Myers III, MD       • ondansetron " "(ZOFRAN) injection 4 mg  4 mg Intravenous Q6H PRN Rainer Stevenson MD       • sodium chloride 0.9 % flush 10 mL  10 mL Intravenous PRN Rainer Stevenson MD       • sodium chloride 0.9 % flush 10 mL  10 mL Intravenous Q12H Rainer Stevenson MD   10 mL at 08/03/20 0959   • sodium chloride 0.9 % flush 10 mL  10 mL Intravenous PRN Rainer Stevenson MD             ALLERGIES: None    FAMILY HISTORY: Noncontributory    SOCIAL HISTORY: Patient resides at a local nursing facility.  There is no reported use of alcohol tobacco or street drugs.    MENTAL STATUS EXAM: Patient is an elderly white male appearing his stated age.  He is in no apparent physical distress at the time of examination.  He is awake and alert and oriented to person place year and can identify the [place] president.  His mood is calm his affect constricted.  Speech is impoverished but generally relevant coherent.  The patient does not comply with formal testing of memory and cognition.  He denies suicidal homicidal ideation and denies any psychotic symptoms.  His judgment and insight show mild impairment.    ASSETS/LIABILITIES: To be assessed    DIAGNOSTIC IMPRESSION: Delirium secondary to urinary tract infection, multiple medical problems described previously, possible early dementia    PLAN: I have added PRN Zyprexa to be given for agitation.  The patient's fluctuating level of consciousness is consistent with a delirium.  I may have caught him during a \"lucid period\" at the time of today's interview.  I would, however, expect that his mentation should return to baseline with resolution of his urinary tract infection.  If it does not, consideration may need to be given to a [facility] admission.  "

## 2020-08-04 NOTE — DISCHARGE PLACEMENT REQUEST
"Allen Avalos (75 y.o. Male)     Date of Birth Social Security Number Address Home Phone MRN    1945  [place] [address] [facility] [address] [phone] [id number]    [employer] Marital Status          None Unknown       Admission Date Admission Type Admitting Provider Attending Provider Department, Room/Bed    8/1/20 Emergency Rainer Stevenson MD Kapila, Abhishek, MD [address], [address]591/1    Discharge Date Discharge Disposition Discharge Destination                       Attending Provider:  Vince Otto MD    Allergies:  No Known Allergies    Isolation:  None   Infection:  None   Code Status:  No CPR    Ht:  170.2 cm (67\")   Wt:  71.7 kg (158 lb 1.6 oz)    Admission Cmt:  None   Principal Problem:  Unresponsive [R41.89]                 Active Insurance as of 8/1/2020     Primary Coverage     Payor Plan Insurance Group Employer/Plan Group    [facility] WELL[employer] MEDICARE REPLACEMENT [id number]     Payor Plan Address Payor Plan Phone Number Payor Plan Fax Number Effective Dates    [address] [address] [phone]  8/1/2020 - None Entered    [facility] [id number]       Subscriber Name Subscriber Birth Date Member ID       Allen Avalos 1945 [id number]           Secondary Coverage     Payor Plan Insurance Group Employer/Plan Group    [place]      Payor Plan Address Payor Plan Phone Number Payor Plan Fax Number Effective Dates    [address] [phone]  8/1/2020 - None Entered    [facility] [id number]       Subscriber Name Subscriber Birth Date Member ID       ALLEN AVALOS 1945 [id number]                 Emergency Contacts      (Rel.) Home Phone Work Phone Mobile Phone    ROHINI ZHAO (Sister) [phone] -- --              "

## 2020-08-04 NOTE — NURSING NOTE
"Walked into patients room for morning report. patient began cursing and telling me to \"suck my erik you cunt\" \"you're a fucking whore.\" I explained to the patient that he was not going to talk to any of the staff in this way. Patient states \"I'll talk to you anyway I want to fucking talk to you, you bitch.\" Charge nurse and security called. Charge nurse experienced the same verbal assault from the patient. IM Zyprexa given and MD paged. This RN was washing her hands and patient states \"Why are you washing your hands you should just stroke one out with her.\" The charge nurse and I stepped into the hallway to wait for security. Security arrived and patient states \"here comes the boys in blue, fuck you and fuck you.\" Security tells the patient that he can't talk to the nurses that way. Patient continues to curse and yell out sexually inappropriate statements. Patient can be heard in the hallway, despite door being closed. Patient continues to yell out whore, cunt, and other explicit statements. As of the time of the note, this RN is waiting for a return call from psych.   "

## 2020-08-04 NOTE — NURSING NOTE
GRIFFIN called me and asked that I call Dr. Meyrs and request oral scheduled zyprexa. Call placed to the access center. Evelyn called me back from access and states that Dr. Myers will be in in the morning and can address this in the morning. I explained that this request is from another provider and I was told that Dr. Myers will not return any calls or pages to the units only to [facility]. Evelyn states that Dr. Myers might respond if an MD calls him directly, but that she isn't going to page him. I also asked about the patient being transferred to [facility]U tomorrow and Evelyn reported that she was not aware of any transfer as he has not been accepted or medically cleared. I informed her that the patient has been medically cleared and that he is appropriate for CMU at this time. GRIFFIN paged to update on the situation. RN to continue to monitor.

## 2020-08-04 NOTE — PLAN OF CARE
Problem: Patient Care Overview  Goal: Plan of Care Review  Flowsheets (Taken 8/4/2020 0527)  Progress: no change  Plan of Care Reviewed With: patient  Note:   Restraints maintained, denies pain, Zyprexa given once this shift for agitation, sister updated by phone, snacks offered but refused stating he would only stop shouting once he received a tuna sandwich cereal given as an alternative, no sticks on right arm, dialysis planned for today, will continue to monitor.     Problem: Restraint, Nonbehavioral (Nonviolent)  Goal: Rationale and Justification  Flowsheets (Taken 8/4/2020 0527)  Rationale and Justification: prevent harm to self     Problem: Restraint, Nonbehavioral (Nonviolent)  Goal: Nonbehavioral (Nonviolent) Restraint: Absence of Injury/Harm  Flowsheets (Taken 8/4/2020 0527)  Nonbehavioral (Nonviolent) Restraint: Absence of Injury/Harm: met     Problem: Restraint, Nonbehavioral (Nonviolent)  Goal: Nonbehavioral (Nonviolent) Restraint: Achievement of Discontinuation Criteria  Flowsheets (Taken 8/4/2020 0527)  Nonbehavioral (Nonviolent) Restraint: Achievement of Discontinuation Criteria: not met     Problem: Restraint, Nonbehavioral (Nonviolent)  Goal: Nonbehavioral (Nonviolent) Restraint: Preservation of Dignity and Wellbeing  Flowsheets (Taken 8/4/2020 0527)  Nonbehavioral (Nonviolent) Restraint: Preservation of Dignity and Wellbeing: met     Problem: Skin Injury Risk (Adult)  Goal: Identify Related Risk Factors and Signs and Symptoms  Flowsheets (Taken 8/4/2020 0527)  Related Risk Factors (Skin Injury Risk): mechanical forces;mobility impaired;moisture

## 2020-08-04 NOTE — NURSING NOTE
Called to patient's room by primary nurse due to verbally belligerent behavior. Upon entering room, patient noted to be name calling using profanity towards nurse, sexually inappropriate comments made towards nursing staff, and yelling inappropriate profane comments. Advised patient that he could not talk to nursing staff in that manner, patient stated that he would continue with verbal abuse of nursing staff. Security notified. House manger notified.

## 2020-08-04 NOTE — PROGRESS NOTES
[facility]               [facility]     LOS: 3 days     Name: Lucien Avalos  Age: 75 y.o.  Sex: male  :  1945  MRN: [id number]         Primary Care Physician: Matilde Hi MD    Diet Soft Texture; Ground; Thin    Subjective    Patient agitated and confused at times. apparently has not been very appropriate with staff.     ROS: unobtainable due to confusion     Objective   Temp:  [97.1 °F (36.2 °C)-98.2 °F (36.8 °C)] 98.2 °F (36.8 °C)  Heart Rate:  [83-95] 83  Resp:  [17-19] 18  BP: ()/(55-79) 97/55  SpO2:  [92 %-100 %] 100 %  on   ;   Device (Oxygen Therapy): room air  Body mass index is 24.76 kg/m².    Physical Exam   Constitutional: He appears well-developed and well-nourished. No distress.   Cardiovascular: Normal rate and regular rhythm.   Pulmonary/Chest: Breath sounds normal. No respiratory distress.   Abdominal: Soft. Bowel sounds are normal. He exhibits no distension.   Neurological: He is alert.   oriented to person,  place and time   Nursing note and vitals reviewed.      Reviewed medications and new clinical results        Results from last 7 days   Lab Units 20  0520  0940 20  0941 20  1214   WBC 10*3/mm3 6.00 6.79 5.19 7.82   HEMOGLOBIN g/dL 9.1* 9.3* 9.6* 10.5*   PLATELETS 10*3/mm3 93* 89* 90* 94*     Results from last 7 days   Lab Units 20  0544 20  0940 20  0941 20  1229 20  1214   SODIUM mmol/L 134* 131* 130*  --  136   POTASSIUM mmol/L 4.6 4.2 3.7  --  4.0   CHLORIDE mmol/L 97* 94* 93*  --  96*   CO2 mmol/L 25.1 24.1 23.8  --  28.0   BUN mg/dL 47* 38* 29*  --  18   CREATININE mg/dL 7.66* 7.16* 5.62* 4.00* 4.06*   CALCIUM mg/dL 8.4* 8.3* 8.1*  --  7.8*   GLUCOSE mg/dL 122* 149* 148*  --  152*     Lab Results   Component Value Date    ANIONGAP 11.9 2020     Glucose   Date/Time Value Ref Range Status   2020 1148 164 (H) 70 - 130 mg/dL Final   2020 2301 139 (H) 70 - 130  mg/dL Final   08/03/2020 1628 161 (H) 70 - 130 mg/dL Final   08/03/2020 1101 180 (H) 70 - 130 mg/dL Final   08/03/2020 0731 153 (H) 70 - 130 mg/dL Final   08/02/2020 2043 124 70 - 130 mg/dL Final     Hemoglobin A1C   Date Value Ref Range Status   08/02/2020 5.87 (H) 4.80 - 5.60 % Final     Estimated Creatinine Clearance: 8.7 mL/min (A) (by C-G formula based on SCr of 7.66 mg/dL (H)).    Lab Results   Component Value Date    COVID19 Not Detected 08/01/2020     Assessment/Plan   Active Hospital Problems    Diagnosis  POA   • **Unresponsive [R41.89]  Unknown   • Acute metabolic encephalopathy [G93.41]  Yes   • COPD (chronic obstructive pulmonary disease) (CMS/[facility]) [J44.9]  Yes   • Diabetes mellitus (CMS/[facility]) [E11.9]  Yes   • Hypertension [I10]  Unknown   • ESRD (end stage renal disease) (CMS/[facility]) [N18.6]  Yes   • UTI (urinary tract infection) [N39.0]  Unknown   • Elevated troponin [R79.89]  Unknown   • Anemia due to chronic kidney disease [N18.9, D63.1]  Unknown      Resolved Hospital Problems   No resolved problems to display.     75 y.o. male    Assessment and plan  1. Acute metabolic encephalopathy, continue to monitor for mental status changes. Neurology recommends ativan and haldol as needed. psychiatry saw and recommends PRN  zyprexa for agitation and probably needs to be scheduled. will ask Dr. Myers. RAMÓN to [facility] hopefully tomorrow.  2.  Increased agitation, patient currently in restraints for safety.    3.  Urinary tract infection, he has received Rocephin, urine cultures negative -- DC rocephin.  4.  End-stage renal disease, continue dialysis per nephrology recommendations.  5.  COPD, chronic and stable.  6.  Hyponatremia, improving.  7.  Further plans based on hospital course.      Ashley Bagley, APRN   08/04/20  13:55

## 2020-08-04 NOTE — PLAN OF CARE
See previous RN notes; Plan is for patient to go to [facility] after eval from psych tomorrow. HD completed in room today. RN to continue to monitor.

## 2020-08-04 NOTE — PROGRESS NOTES
Continued Stay Note  [facility]     Patient Name: Lucien Avalos  MRN: [id number]  Today's Date: 8/4/2020    Admit Date: 8/1/2020    Discharge Plan     Row Name 08/04/20 1445       Plan    Plan  [facility]    Patient/Family in Agreement with Plan  yes    Plan Comments  Spoke with LAWANDA Raza/[facility] who says AMADA Garcia/[facility] plans for the patient to recieve hemodialysis today at the bedside on [address], and then discharge tomorrow (8/5/2020) to [facility]. Notified the patient's primary nurse/LAWANDA Nielson. Call placed to [facility]'s CCP/Domenica to update her; await her call back. CCP following closely. --LAWANDA Mattson    Row Name 08/04/20 3343       Plan    Plan  Return to [facility]    Plan Comments  IMM 8/1/2020.  Unable to meet with patient to discuss discharge needs due to his confusion and agitation.  Spoke with Aquilino Ferrera sister [phone]; she confirmed patient discharge plan is to return to [facility] where he can get personal care and dialysis services.  Spoke with Nallely; she confirms facility can accept back. Will need to evaluate transportation needs.  Will continue to monitor for new or changing discharge needs.        Discharge Codes    No documentation.             Milly Bartlett RN

## 2020-08-04 NOTE — PROGRESS NOTES
"   LOS: 3 days    Patient Care Team:  PersonMatilde MD as PCP - General (Internal Medicine)    Chief Complaint:    Chief Complaint   Patient presents with   • Altered Mental Status   • Hypotension     Follow-up end-stage renal  Subjective     Interval History:   Agitated and very inappropriate early this am.  Received Zyprexa.  Now calm in restraints.  RN reports he ate well for breakfast and lunch.  Patient tells me he feels like bowels need to move. Not soa.   DW RN.  Psych contacted regarding outburst this am.      Review of Systems:   As noted    Objective     Vital Signs  Temp:  [97.1 °F (36.2 °C)-98.2 °F (36.8 °C)] 98.2 °F (36.8 °C)  Heart Rate:  [83-95] 83  Resp:  [17-19] 18  BP: ()/(55-79) 97/55    Flowsheet Rows      First Filed Value   Admission Height  170.2 cm (67\") Documented at 08/01/2020 1247   Admission Weight  71.7 kg (158 lb 1.6 oz) Documented at 08/01/2020 1210          No intake/output data recorded.  I/O last 3 completed shifts:  In: 340 [P.O.:240; IV Piggyback:100]  Out: -   No intake or output data in the 24 hours ending 08/04/20 1416    Physical Exam:  General Appearance: Sleeping soundly, but does awaken.  Calm, knows he is in the hospital.    Skin: warm and dry  HEENT: Pupils round reactive to light, nonicteric sclerae  Neck: No JVD.  Lungs: Clear to auscultation, no wheezing .  Heart: RRR, normal S1 and S2, no S3, no rub  Abdomen: soft, nontender,+ bs.  Extremities:RUE AVF patent .          Results Review:    Results from last 7 days   Lab Units 08/04/20  0544 08/03/20  0940 08/02/20  0941  08/01/20  1214   SODIUM mmol/L 134* 131* 130*  --  136   POTASSIUM mmol/L 4.6 4.2 3.7  --  4.0   CHLORIDE mmol/L 97* 94* 93*  --  96*   CO2 mmol/L 25.1 24.1 23.8  --  28.0   BUN mg/dL 47* 38* 29*  --  18   CREATININE mg/dL 7.66* 7.16* 5.62*   < > 4.06*   CALCIUM mg/dL 8.4* 8.3* 8.1*  --  7.8*   BILIRUBIN mg/dL  --   --  0.7  --  1.0   ALK PHOS U/L  --   --  59  --  66   ALT (SGPT) U/L  --   " --  14  --  15   AST (SGOT) U/L  --   --  19  --  19   GLUCOSE mg/dL 122* 149* 148*  --  152*    < > = values in this interval not displayed.       Estimated Creatinine Clearance: 8.7 mL/min (A) (by C-G formula based on SCr of 7.66 mg/dL (H)).    Results from last 7 days   Lab Units 08/04/20  0544 08/03/20  0940 08/01/20  1214   MAGNESIUM mg/dL  --  2.0 1.8   PHOSPHORUS mg/dL 8.1* 6.8*  --              Results from last 7 days   Lab Units 08/04/20  0544 08/03/20  0940 08/02/20  0941 08/01/20  1214   WBC 10*3/mm3 6.00 6.79 5.19 7.82   HEMOGLOBIN g/dL 9.1* 9.3* 9.6* 10.5*   PLATELETS 10*3/mm3 93* 89* 90* 94*       Results from last 7 days   Lab Units 08/01/20  1214   INR  1.08         Imaging Results (Last 24 Hours)     ** No results found for the last 24 hours. **          sodium chloride 10 mL Intravenous Q12H          Medication Review:   Current Facility-Administered Medications   Medication Dose Route Frequency Provider Last Rate Last Dose   • acetaminophen (TYLENOL) tablet 650 mg  650 mg Oral Q4H PRN Rainer Stevenson MD        Or   • acetaminophen (TYLENOL) 160 MG/5ML solution 650 mg  650 mg Oral Q4H PRN Rainer Stevenson MD        Or   • acetaminophen (TYLENOL) suppository 650 mg  650 mg Rectal Q4H PRN Rainer Stevenson MD       • nitroglycerin (NITROSTAT) SL tablet 0.4 mg  0.4 mg Sublingual Q5 Min PRN Rainer Stevenson MD       • OLANZapine (zyPREXA) injection 10 mg  10 mg Intramuscular Q8H PRN Luis F Myers III, MD       • ondansetron (ZOFRAN) injection 4 mg  4 mg Intravenous Q6H PRN Rainer Stevenson MD       • sodium chloride 0.9 % flush 10 mL  10 mL Intravenous PRN Rainer Stevenson MD       • sodium chloride 0.9 % flush 10 mL  10 mL Intravenous Q12H Rainer Stevenson MD   10 mL at 08/04/20 0857   • sodium chloride 0.9 % flush 10 mL  10 mL Intravenous PRN Rainer Stevenson MD           Assessment/Plan   1.  ESRD, on dialysis every TTS.  HD today in his room while in restraints.   2.  Altered mental state  3.  Anemia and  thrombocytopenia, hemoglobin today is 9.1 and platelet 93,000.  4.  Diabetes mellitus type 2  5.  Pyuria.  No growth on urine culture.   6.  Altered mental status.  Would scheduled zyprexa be helpful?  7.  COPD    Plan:  1. Dialysis today in his room.         Jasmin Dooley MD  08/04/20  14:16

## 2020-08-04 NOTE — PROGRESS NOTES
Discharge Planning Assessment  [facility]     Patient Name: Lucien Avalos  MRN: [id number]  Today's Date: 8/4/2020    Admit Date: 8/1/2020    Discharge Needs Assessment     Row Name 08/04/20 1328       Living Environment    Lives With  facility resident    Name(s) of Who Lives With Patient  [facility]    Current Living Arrangements  extended care facility    Primary Care Provided by  other (see comments)    Provides Primary Care For  other (see comments)    Quality of Family Relationships  supportive    Able to Return to Prior Arrangements  yes       Resource/Environmental Concerns    Resource/Environmental Concerns  none       Transition Planning    Patient/Family Anticipates Transition to  long term care facility    Patient/Family Anticipated Services at Transition  none    Transportation Anticipated  health plan transportation       Discharge Needs Assessment    Concerns to be Addressed  discharge planning    Provided Post Acute Provider List?  N/A    N/A Provider List Comment  Plans to return to [facility]    Patient's Choice of Community Agency(s)  Plans to return to [facility]     Current Discharge Risk  chronically ill;cognitively impaired        Discharge Plan     Row Name 08/04/20 1332       Plan    Plan  Return to [facility]    Plan Comments  IMM 8/1/2020.  Unable to meet with patient to discuss discharge needs due to his confusion and agitation.  Spoke with Aquilino Ferrera sister [phone]; she confirmed patient discharge plan is to return to [facility] where he can get personal care and dialysis services.  Spoke with Nallely; she confirms facility can accept back. Will need to evaluate transportation needs.  Will continue to monitor for new or changing discharge needs.        Destination      Service Provider Request Status Selected Services Address Phone Number Fax Number    [facility] Accepted N/A [id number] [address] [phone]  [phone]      Durable Medical Equipment      Coordination has not been started for this encounter.      Dialysis/Infusion      Coordination has not been started for this encounter.      Home Medical Care      Coordination has not been started for this encounter.      Therapy      Coordination has not been started for this encounter.      Community Resources      Coordination has not been started for this encounter.          Demographic Summary     Row Name 08/04/20 1326       General Information    Admission Type  inpatient    Arrived From  emergency department    Required Notices Provided  Important Message from Medicare    Referral Source  admission list    Reason for Consult  discharge planning    Preferred Language  English     Used During This Interaction  no       Contact Information    Permission Granted to Share Info With  family/designee Aquilino Ferrera sister [phone]        Functional Status     Row Name 08/04/20 1326       Functional Status    Usual Activity Tolerance  poor    Current Activity Tolerance  poor       Functional Status, IADL    Medications  completely dependent    Meal Preparation  completely dependent    Housekeeping  completely dependent    Laundry  completely dependent    Shopping  completely dependent       Mental Status    General Appearance  unkempt       Mental Status Summary    Recent Changes in Mental Status/Cognitive Functioning  no changes        Psychosocial    No documentation.       Abuse/Neglect    No documentation.       Legal    No documentation.       Substance Abuse    No documentation.       Patient Forms    No documentation.           Alina Sierra RN

## 2020-08-05 NOTE — PLAN OF CARE
Problem: Patient Care Overview  Goal: Plan of Care Review  Flowsheets (Taken 8/5/2020 0534)  Progress: improving  Plan of Care Reviewed With: patient  Note:   Patient alert but disoriented to time and place, restraints maintained, dialysis ended half complete per patient request, nausea and vomiting twice this shift PRN Zofran given IV, blood pressure lows after dialysis patient asymptomatic, abdominal pain intermittent, continues to yell out into chamberlain frequently awake seeking staff vulgar language/insults throughout the night however approximately 4 am patient calm and offering apologies for his behavior, Access consult planned for today, will continue to monitor patient.     Problem: Fall Risk (Adult)  Goal: Identify Related Risk Factors and Signs and Symptoms  Flowsheets (Taken 8/5/2020 0534)  Related Risk Factors (Fall Risk): age-related changes; sleep pattern alteration; objects hard to reach; environment unfamiliar; confusion/agitation; bladder function altered; depression/anxiety     Problem: Restraint, Nonbehavioral (Nonviolent)  Goal: Rationale and Justification  Flowsheets (Taken 8/5/2020 0534)  Rationale and Justification: prevent harm to self     Problem: Restraint, Nonbehavioral (Nonviolent)  Goal: Nonbehavioral (Nonviolent) Restraint: Absence of Injury/Harm  Flowsheets (Taken 8/5/2020 0534)  Nonbehavioral (Nonviolent) Restraint: Absence of Injury/Harm: met     Problem: Restraint, Nonbehavioral (Nonviolent)  Goal: Nonbehavioral (Nonviolent) Restraint: Achievement of Discontinuation Criteria  Flowsheets (Taken 8/5/2020 0534)  Nonbehavioral (Nonviolent) Restraint: Achievement of Discontinuation Criteria: not met     Problem: Restraint, Nonbehavioral (Nonviolent)  Goal: Nonbehavioral (Nonviolent) Restraint: Preservation of Dignity and Wellbeing  Flowsheets (Taken 8/5/2020 0534)  Nonbehavioral (Nonviolent) Restraint: Preservation of Dignity and Wellbeing: met     Problem: Skin Injury Risk (Adult)  Goal:  Identify Related Risk Factors and Signs and Symptoms  Flowsheets (Taken 8/4/2020 7573)  Related Risk Factors (Skin Injury Risk): mechanical forces;mobility impaired;moisture

## 2020-08-05 NOTE — NURSING NOTE
"Pt seen by AC in P591 for follow-up/re-evaluation after suggestion by Dr. Myers that pt may be appropriate for transfer to [facility] if he had not yet returned to baseline bx. Per report of pt's RN, Dolores, and review of nursing notes over past 24 hours, pt has been frequently agitated- verbally abusive toward staff (name-calling, cursing, making threats of violence) and has displayed some physical aggression (combative bx; attempting to kick staff, resisting staff while staff were helping him ambulate to and from restroom). Pt placed in soft wrist restraints at approximately 1125 today for safety.     Upon approach, pt was alone in room with TV on, sitting up in bed, eyes closed, no apparent distress. Pt was restrained at his wrists. Pt was responsive to his name, as he opened his eyes when RN spoke to him. AC RN introduced self, explained role. Pt was oriented x3. Pt's affect was overall flat, though irritable at times. Pt was calm and cooperative throughout assessment, answered questions overall appropriately (rambling at times, giving a few inaccurate details), and was quite talkative throughout the interview. Pt stated he remembered speaking with Dr. Myers previously, stating, \"he was that Green[place]ic selena, or he seemed Greenlandic\". With regard to his behaviors from earlier today and throughout his hospitalization so far, pt stated, \"I was just talking, I wouldn't hurt nobody\" and \"I've been with my girlfriend for 30 years and I never laid a hand on her\", \"I get upset, I say things I don't mean. You can look at my records, I've never hurt anybody\". Pt went on to explain sources of his irritability as his multiple chronic health conditions; \"I get a little upset sometimes. Dialysis, I got no kidney, heart trouble. I just got a lot going on.\"     Pt currently resides at [facility], stating he has \"been there for a while\". When asked if he likes living here, he stated, \"Yeah, it's ok. The people " "are nice.\" His sister, Kaylin, is his POA (\"She takes care of the paperwork\") and he spoke favorably and extensively about her (\"She's been helping me, but she has problems of her own, she's had rectal cancer\"). Pt spoke of his girlfriend, Dahlia, whom he's been with for approximately \"30 years\" thus far. 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     Pt denied any SI at this time, and denied any desire to hurt anyone else (again stating, \"I never wanted to hurt anybody\"). He rated his current level of depression as \"5\"/10, and denied experiencing any anxiety at this time. Per review of pt's hx, pt reportedly was taken to [facility] approximately 2 weeks ago due to SI with threats to hang himself with a call-light cord.    Per Dr. Myers, plan is for pt to transfer to [facility] tomorrow (8/6/20) following dialysis tx (pt receives dialysis 3x/week- Tuesdays, Thursdays, Saturdays) for medication adjustment/further evaluation/observation for pt's bxs return to baseline before being discharged back to [facility]. Once on [facility], pt will likely need a sitter, especially when being transported off unit to receive dialysis tx. Pt is a fall-risk, as he uses a walker and is unsteady on his feet at times.   "

## 2020-08-05 NOTE — PROGRESS NOTES
[facility]               ASSOCIATES     LOS: 4 days     Name: Lucien Avalos  Age: 75 y.o.  Sex: male  :  1945  MRN: [id number]         Primary Care Physician: Matilde Hi MD    Diet Soft Texture; Ground; Thin    Subjective    oriented x4 with me and calm but per nursing staff right before my encounter was verbally abusive and combative. he unfortunately had to be placed back in non violent restraints.     ROS: denies chest pain, palpitations, SOA, fever, chills, nausea and vomiting.     Objective   Temp:  [97.7 °F (36.5 °C)-99.2 °F (37.3 °C)] 98.4 °F (36.9 °C)  Heart Rate:  [] 106  Resp:  [18-19] 18  BP: ()/(50-79) 106/74  SpO2:  [91 %-100 %] 95 %  on   ;   Device (Oxygen Therapy): room air  Body mass index is 24.76 kg/m².    Physical Exam   Constitutional: He is oriented to person, place, and time. He appears well-developed and well-nourished. No distress.   Cardiovascular: Normal rate and regular rhythm.   Pulmonary/Chest: Breath sounds normal. No respiratory distress.   Abdominal: Soft. Bowel sounds are normal. He exhibits no distension.   Neurological: He is alert and oriented to person, place, and time.   Nursing note and vitals reviewed.      Reviewed medications and new clinical results        Results from last 7 days   Lab Units 20  0510 20  0544 20  0940 20  0941 20  1214   WBC 10*3/mm3 6.22 6.00 6.79 5.19 7.82   HEMOGLOBIN g/dL 8.9* 9.1* 9.3* 9.6* 10.5*   PLATELETS 10*3/mm3 89* 93* 89* 90* 94*     Results from last 7 days   Lab Units 20  0510 20  0544 20  0940 20  0941 20  1229 20  1214   SODIUM mmol/L 135* 134* 131* 130*  --  136   POTASSIUM mmol/L 4.2 4.6 4.2 3.7  --  4.0   CHLORIDE mmol/L 100 97* 94* 93*  --  96*   CO2 mmol/L 22.4 25.1 24.1 23.8  --  28.0   BUN mg/dL 40* 47* 38* 29*  --  18   CREATININE mg/dL 7.19* 7.66* 7.16* 5.62* 4.00* 4.06*   CALCIUM mg/dL 7.9* 8.4* 8.3*  8.1*  --  7.8*   GLUCOSE mg/dL 157* 122* 149* 148*  --  152*     Lab Results   Component Value Date    ANIONGAP 12.6 08/05/2020     Glucose   Date/Time Value Ref Range Status   08/05/2020 1205 150 (H) 70 - 130 mg/dL Final   08/05/2020 0724 161 (H) 70 - 130 mg/dL Final   08/05/2020 0003 153 (H) 70 - 130 mg/dL Final   08/04/2020 1637 193 (H) 70 - 130 mg/dL Final   08/04/2020 1148 164 (H) 70 - 130 mg/dL Final   08/03/2020 2301 139 (H) 70 - 130 mg/dL Final   08/03/2020 1628 161 (H) 70 - 130 mg/dL Final   08/03/2020 1101 180 (H) 70 - 130 mg/dL Final     No results found for: HGBA1C  Estimated Creatinine Clearance: 9.3 mL/min (A) (by C-G formula based on SCr of 7.19 mg/dL (H)).    Lab Results   Component Value Date    COVID19 Not Detected 08/01/2020     Assessment/Plan   Active Hospital Problems    Diagnosis  POA   • **Unresponsive [R41.89]  Unknown   • Acute metabolic encephalopathy [G93.41]  Yes   • COPD (chronic obstructive pulmonary disease) (CMS/[facility]) [J44.9]  Yes   • Diabetes mellitus (CMS/[facility]) [E11.9]  Yes   • Hypertension [I10]  Unknown   • ESRD (end stage renal disease) (CMS/[facility]) [N18.6]  Yes   • UTI (urinary tract infection) [N39.0]  Unknown   • Elevated troponin [R79.89]  Unknown   • Anemia due to chronic kidney disease [N18.9, D63.1]  Unknown      Resolved Hospital Problems   No resolved problems to display.     75 y.o. male    Assessment and plan  1. Acute metabolic encephalopathy, continue to monitor for mental status changes. Will go ahead and change PRN zypreza to scheduled, nightly. would like for psychiatry to evaluate the patient again with hopeful discharge to [facility] tomorrow.  2.  Increased agitation, patient currently in restraints for safety.    3.  Urinary tract infection, he has received Rocephin, urine cultures negative -- DC rocephin.  4.  End-stage renal disease, continue dialysis per nephrology recommendations. if patient is transferred to [facility] he will have to have his mood stabilized before  dialysis will accept. he is allowed to be in restraints but cannot be belligerent.   5.  COPD, chronic and stable.  6.  Hyponatremia, improving.  7.  Further plans based on hospital course.    Full code  SCDs  dispo: [facility] tomorrow, he is medically stable.       Ashley Bagley, APRN   08/05/20  13:19

## 2020-08-05 NOTE — PROGRESS NOTES
"   LOS: 4 days    Patient Care Team:  PersonMatilde MD as PCP - General (Internal Medicine)    Chief Complaint:    Chief Complaint   Patient presents with   • Altered Mental Status   • Hypotension     Follow-up end-stage renal  Subjective     Interval History:   The patient had dialysis yesterday but he insisted to stop it short yesterday and he said he did not want dialysis but this morning he said he really did not mean to do that they should have not listen to him.  He still in restraints but he is much more alert and awake, denies any chest pain or shortness of air, no orthopnea or PND, no nausea or vomiting.      Review of Systems:   As noted    Objective     Vital Signs  Temp:  [97.7 °F (36.5 °C)-99.2 °F (37.3 °C)] 98.4 °F (36.9 °C)  Heart Rate:  [] 106  Resp:  [18-19] 18  BP: ()/(50-79) 106/74    Flowsheet Rows      First Filed Value   Admission Height  170.2 cm (67\") Documented at 08/01/2020 1247   Admission Weight  71.7 kg (158 lb 1.6 oz) Documented at 08/01/2020 1210          No intake/output data recorded.  No intake/output data recorded.  No intake or output data in the 24 hours ending 08/05/20 1012    Physical Exam:  General Appearance: Awake, alert and oriented x2, no acute distress, appears to be chronically ill, he has soft wrist restraints  Skin: warm and dry  HEENT: Pupils round reactive to light, nonicteric sclerae  Neck: No JVD, no carotid  Lungs: CTA, unlabored breathing effort  Heart: RRR, normal S1 and S2, no S3, no rub  Abdomen: soft, nontender,  present bowel sounds to auscultation  : no palpable bladder,  Extremities: no edema, cyanosis or clubbing, functional AV fistula in the right upper  Neuro: Normal speech and mental status, moving all extremities        Results Review:    Results from last 7 days   Lab Units 08/05/20  0510 08/04/20  0544 08/03/20  0940 08/02/20  0941  08/01/20  1214   SODIUM mmol/L 135* 134* 131* 130*  --  136   POTASSIUM mmol/L 4.2 4.6 4.2 3.7  -- "  4.0   CHLORIDE mmol/L 100 97* 94* 93*  --  96*   CO2 mmol/L 22.4 25.1 24.1 23.8  --  28.0   BUN mg/dL 40* 47* 38* 29*  --  18   CREATININE mg/dL 7.19* 7.66* 7.16* 5.62*   < > 4.06*   CALCIUM mg/dL 7.9* 8.4* 8.3* 8.1*  --  7.8*   BILIRUBIN mg/dL  --   --   --  0.7  --  1.0   ALK PHOS U/L  --   --   --  59  --  66   ALT (SGPT) U/L  --   --   --  14  --  15   AST (SGOT) U/L  --   --   --  19  --  19   GLUCOSE mg/dL 157* 122* 149* 148*  --  152*    < > = values in this interval not displayed.       Estimated Creatinine Clearance: 9.3 mL/min (A) (by C-G formula based on SCr of 7.19 mg/dL (H)).    Results from last 7 days   Lab Units 08/05/20  0510 08/04/20  0544 08/03/20  0940 08/01/20  1214   MAGNESIUM mg/dL  --   --  2.0 1.8   PHOSPHORUS mg/dL 6.0* 8.1* 6.8*  --              Results from last 7 days   Lab Units 08/05/20  0510 08/04/20  0544 08/03/20  0940 08/02/20  0941 08/01/20  1214   WBC 10*3/mm3 6.22 6.00 6.79 5.19 7.82   HEMOGLOBIN g/dL 8.9* 9.1* 9.3* 9.6* 10.5*   PLATELETS 10*3/mm3 89* 93* 89* 90* 94*       Results from last 7 days   Lab Units 08/01/20  1214   INR  1.08         Imaging Results (Last 24 Hours)     ** No results found for the last 24 hours. **          insulin lispro 0-7 Units Subcutaneous TID AC   sodium chloride 10 mL Intravenous Q12H          Medication Review:   Current Facility-Administered Medications   Medication Dose Route Frequency Provider Last Rate Last Dose   • acetaminophen (TYLENOL) tablet 650 mg  650 mg Oral Q4H PRN Rainer Stevenson MD   650 mg at 08/05/20 0429    Or   • acetaminophen (TYLENOL) 160 MG/5ML solution 650 mg  650 mg Oral Q4H PRN Rainer Stevenson MD        Or   • acetaminophen (TYLENOL) suppository 650 mg  650 mg Rectal Q4H PRN Rainer Stevenson MD       • albumin human 25 % IV SOLN 25 g  25 g Intravenous 4x Daily PRN Connor Vazquez MD       • dextrose (D50W) 25 g/ 50mL Intravenous Solution 25 g  25 g Intravenous Q15 Min PRN Vince Otto MD       • dextrose (GLUTOSE)  oral gel 15 g  15 g Oral Q15 Min PRN Vince Otto MD       • glucagon (human recombinant) (GLUCAGEN DIAGNOSTIC) injection 1 mg  1 mg Subcutaneous Q15 Min PRN Vince Otto MD       • insulin lispro (humaLOG) injection 0-7 Units  0-7 Units Subcutaneous TID AC Vince Otto MD   2 Units at 08/05/20 0854   • nitroglycerin (NITROSTAT) SL tablet 0.4 mg  0.4 mg Sublingual Q5 Min PRN Rainer Stevenson MD       • OLANZapine (zyPREXA) injection 10 mg  10 mg Intramuscular Q8H PRN Luis F Myers III, MD       • ondansetron (ZOFRAN) injection 4 mg  4 mg Intravenous Q6H PRN Rainer Stevenson MD   4 mg at 08/05/20 0402   • sodium chloride 0.9 % flush 10 mL  10 mL Intravenous PRN Rainer Stevenson MD       • sodium chloride 0.9 % flush 10 mL  10 mL Intravenous Q12H Rainer Stevenson MD   10 mL at 08/05/20 0854   • sodium chloride 0.9 % flush 10 mL  10 mL Intravenous PRN Rainer Stevenson MD           Assessment/Plan   1.  End-stage renal disease, on maintenance hemodialysis on dialysis every Tuesday, Thursday and Saturday, he appears to be euvolemic, had partial dialysis treatment because he insisted on discontinuation yesterday but he is stating that he wants to continue to dialysis and he does not want to consult any more short.    2.  Altered mental state, significantly improved  3.  Anemia and thrombocytopenia, hemoglobin today is 8.9 and the platelet is 89,000  4.  Diabetes mellitus type 2  5.  Possible UTI, difficult to interpret with ESRD and his low urine output  6.  COPD    Plan:  1.  Continue the same treatment  2.  Hemodialysis tomorrow  3.  Surveillance labs          Zeus Paulson MD  08/05/20  10:12

## 2020-08-05 NOTE — PROGRESS NOTES
Continued Stay Note  [facility]     Patient Name: Lucien Avalos  MRN: [id number]  Today's Date: 8/5/2020    Admit Date: 8/1/2020    Discharge Plan     Row Name 08/05/20 1613       Plan    Plan  [facility]?     Plan Comments  Multiple calls with Marie with [facility] today to discuss possible transfer to [facility].  Clarified that patient does not have to dialysis in his room over in [facility], per dialysis staff, they frequently have [facility] patients that are accompanied by a sitter during their dialysis session.  Waiting or clinical staff to see patient this afternoon to confirm that they will be able to accept in [facility].  Will continue to monitor for new or changing discharge needs.        Discharge Codes    No documentation.             Alina Sierra RN

## 2020-08-05 NOTE — SIGNIFICANT NOTE
08/05/20 1100   Rehab Treatment   Discipline speech language pathologist   Reason Treatment Not Performed other (see comments)  (Discussed with RN. No overt s/s aspiration with breakfast this date. SLP discussed with patient, patient denies difficulty with PO. Patient attempted to walk to bathroom, nursing aid arrived to assist patient. SLP to continue to follow for diet tolerance.)

## 2020-08-05 NOTE — PAYOR COMM NOTE
"P: [phone]  F: [phone]    CONTINUED STAY REVIEW    REF #[id number]    ID #[id number]        Allen Avalos (75 y.o. Male)     Date of Birth Social Security Number Address Home Phone MRN    1945  [place] [address] [place] [address] [phone] [id number]    Buddhism Marital Status          None Unknown       Admission Date Admission Type Admitting Provider Attending Provider Department, Room/Bed    8/1/20 Emergency Rainer Stevenson MD Kapila, Abhishek, MD [address], P591/1    Discharge Date Discharge Disposition Discharge Destination                       Attending Provider:  Vince Otto MD    Allergies:  No Known Allergies    Isolation:  None   Infection:  None   Code Status:  No CPR    Ht:  170.2 cm (67\")   Wt:  71.7 kg (158 lb 1.6 oz)    Admission Cmt:  None   Principal Problem:  Unresponsive [R41.89]                 Active Insurance as of 8/1/2020     Primary Coverage     Payor Plan Insurance Group Employer/Plan Group    [facility]AC[facility] [employer] MEDICARE REPLACEMENT [id number]     Payor Plan Address Payor Plan Phone Number Payor Plan Fax Number Effective Dates    [address] [address] [phone]  8/1/2020 - None Entered    [facility] [id number]       Subscriber Name Subscriber Birth Date Member ID       Allen Avalos 1945 [id number]           Secondary Coverage     Payor Plan Insurance Group Employer/Plan Group    [place]      Payor Plan Address Payor Plan Phone Number Payor Plan Fax Number Effective Dates    [address] [phone]  8/1/2020 - None Entered    [facility] [id number]       Subscriber Name Subscriber Birth Date Member ID       ALLEN AVALOS 1945 [id number]                 Emergency Contacts      (Rel.) Home Phone Work Phone Mobile Phone    ROHINI ZHAO (Sister) [phone] -- --            Lines, Drains & Airways    Active LDAs     Name:   Placement date:   " Placement time:   Site:   Days:    Peripheral IV 08/01/20 Left Antecubital   08/01/20    --    Antecubital   4                Current Facility-Administered Medications   Medication Dose Route Frequency Provider Last Rate Last Dose   • acetaminophen (TYLENOL) tablet 650 mg  650 mg Oral Q4H PRN Rainer Stevenson MD   650 mg at 08/05/20 0429    Or   • acetaminophen (TYLENOL) 160 MG/5ML solution 650 mg  650 mg Oral Q4H PRN Rainer Stevenson MD        Or   • acetaminophen (TYLENOL) suppository 650 mg  650 mg Rectal Q4H PRN Rainer Stevenson MD       • albumin human 25 % IV SOLN 25 g  25 g Intravenous 4x Daily PRN Connor Vazquez MD       • dextrose (D50W) 25 g/ 50mL Intravenous Solution 25 g  25 g Intravenous Q15 Min PRN Vince Otto MD       • dextrose (GLUTOSE) oral gel 15 g  15 g Oral Q15 Min PRN Vince Otto MD       • glucagon (human recombinant) (GLUCAGEN DIAGNOSTIC) injection 1 mg  1 mg Subcutaneous Q15 Min PRN Vince Otto MD       • insulin lispro (humaLOG) injection 0-7 Units  0-7 Units Subcutaneous TID AC Vince Otto MD   2 Units at 08/05/20 1216   • nitroglycerin (NITROSTAT) SL tablet 0.4 mg  0.4 mg Sublingual Q5 Min PRN Rainer Stevenson MD       • OLANZapine (zyPREXA) injection 10 mg  10 mg Intramuscular Q8H PRN Luis F Myers III, MD       • ondansetron (ZOFRAN) injection 4 mg  4 mg Intravenous Q6H PRN Rainer Stevenson MD   4 mg at 08/05/20 0402   • sodium chloride 0.9 % flush 10 mL  10 mL Intravenous PRN Rainer Stevenson MD       • sodium chloride 0.9 % flush 10 mL  10 mL Intravenous Q12H Rainer Stevenson MD   10 mL at 08/05/20 0854   • sodium chloride 0.9 % flush 10 mL  10 mL Intravenous PRN Rainer Stevenson MD                Physician Progress Notes      Zeus Paulson MD at 08/05/20 1012             LOS: 4 days    Patient Care Team:  Matilde Hi MD as PCP - General (Internal Medicine)    Chief Complaint:    Chief Complaint   Patient presents with   • Altered Mental Status   •  "Hypotension     Follow-up end-stage renal  Subjective     Interval History:   The patient had dialysis yesterday but he insisted to stop it short yesterday and he said he did not want dialysis but this morning he said he really did not mean to do that they should have not listen to him.  He still in restraints but he is much more alert and awake, denies any chest pain or shortness of air, no orthopnea or PND, no nausea or vomiting.      Review of Systems:   As noted    Objective     Vital Signs  Temp:  [97.7 °F (36.5 °C)-99.2 °F (37.3 °C)] 98.4 °F (36.9 °C)  Heart Rate:  [] 106  Resp:  [18-19] 18  BP: ()/(50-79) 106/74    Flowsheet Rows      First Filed Value   Admission Height  170.2 cm (67\") Documented at 08/01/2020 1247   Admission Weight  71.7 kg (158 lb 1.6 oz) Documented at 08/01/2020 1210          No intake/output data recorded.  No intake/output data recorded.  No intake or output data in the 24 hours ending 08/05/20 1012    Physical Exam:  General Appearance: Awake, alert and oriented x2, no acute distress, appears to be chronically ill, he has soft wrist restraints  Skin: warm and dry  HEENT: Pupils round reactive to light, nonicteric sclerae  Neck: No JVD, no carotid  Lungs: CTA, unlabored breathing effort  Heart: RRR, normal S1 and S2, no S3, no rub  Abdomen: soft, nontender,  present bowel sounds to auscultation  : no palpable bladder,  Extremities: no edema, cyanosis or clubbing, functional AV fistula in the right upper  Neuro: Normal speech and mental status, moving all extremities        Results Review:    Results from last 7 days   Lab Units 08/05/20  0510 08/04/20  0544 08/03/20  0940 08/02/20  0941  08/01/20  1214   SODIUM mmol/L 135* 134* 131* 130*  --  136   POTASSIUM mmol/L 4.2 4.6 4.2 3.7  --  4.0   CHLORIDE mmol/L 100 97* 94* 93*  --  96*   CO2 mmol/L 22.4 25.1 24.1 23.8  --  28.0   BUN mg/dL 40* 47* 38* 29*  --  18   CREATININE mg/dL 7.19* 7.66* 7.16* 5.62*   < > 4.06*   "   CALCIUM mg/dL 7.9* 8.4* 8.3* 8.1*  --  7.8*   BILIRUBIN mg/dL  --   --   --  0.7  --  1.0   ALK PHOS U/L  --   --   --  59  --  66   ALT (SGPT) U/L  --   --   --  14  --  15   AST (SGOT) U/L  --   --   --  19  --  19   GLUCOSE mg/dL 157* 122* 149* 148*  --  152*    < > = values in this interval not displayed.       Estimated Creatinine Clearance: 9.3 mL/min (A) (by C-G formula based on SCr of 7.19 mg/dL (H)).    Results from last 7 days   Lab Units 08/05/20 0510 08/04/20  0544 08/03/20  0940 08/01/20  1214   MAGNESIUM mg/dL  --   --  2.0 1.8   PHOSPHORUS mg/dL 6.0* 8.1* 6.8*  --              Results from last 7 days   Lab Units 08/05/20 0510 08/04/20  0544 08/03/20  0940 08/02/20  0941 08/01/20  1214   WBC 10*3/mm3 6.22 6.00 6.79 5.19 7.82   HEMOGLOBIN g/dL 8.9* 9.1* 9.3* 9.6* 10.5*   PLATELETS 10*3/mm3 89* 93* 89* 90* 94*       Results from last 7 days   Lab Units 08/01/20  1214   INR  1.08         Imaging Results (Last 24 Hours)     ** No results found for the last 24 hours. **          insulin lispro 0-7 Units Subcutaneous TID AC   sodium chloride 10 mL Intravenous Q12H          Medication Review:   Current Facility-Administered Medications   Medication Dose Route Frequency Provider Last Rate Last Dose   • acetaminophen (TYLENOL) tablet 650 mg  650 mg Oral Q4H PRN Rainer Stevenson MD   650 mg at 08/05/20 0429    Or   • acetaminophen (TYLENOL) 160 MG/5ML solution 650 mg  650 mg Oral Q4H PRN Rainer Stevenson MD        Or   • acetaminophen (TYLENOL) suppository 650 mg  650 mg Rectal Q4H PRN Rainer Stevenson MD       • albumin human 25 % IV SOLN 25 g  25 g Intravenous 4x Daily PRN Al-Solaiman, Yaser, MD       • dextrose (D50W) 25 g/ 50mL Intravenous Solution 25 g  25 g Intravenous Q15 Min PRN Vince Otto MD       • dextrose (GLUTOSE) oral gel 15 g  15 g Oral Q15 Min PRN Vince Otto MD       • glucagon (human recombinant) (GLUCAGEN DIAGNOSTIC) injection 1 mg  1 mg Subcutaneous Q15 Min PRN Vince Otto MD        • insulin lispro (humaLOG) injection 0-7 Units  0-7 Units Subcutaneous TID AC Vince Otto MD   2 Units at 08/05/20 0854   • nitroglycerin (NITROSTAT) SL tablet 0.4 mg  0.4 mg Sublingual Q5 Min PRN Rainer Stevenson MD       • OLANZapine (zyPREXA) injection 10 mg  10 mg Intramuscular Q8H PRN Luis F Myers III, MD       • ondansetron (ZOFRAN) injection 4 mg  4 mg Intravenous Q6H PRN Rainer Stevenson MD   4 mg at 08/05/20 0402   • sodium chloride 0.9 % flush 10 mL  10 mL Intravenous PRN Rainer Stevenson MD       • sodium chloride 0.9 % flush 10 mL  10 mL Intravenous Q12H Rainer Stevenson MD   10 mL at 08/05/20 0854   • sodium chloride 0.9 % flush 10 mL  10 mL Intravenous PRN Rainer Stevenson MD           Assessment/Plan   1.  End-stage renal disease, on maintenance hemodialysis on dialysis every Tuesday, Thursday and Saturday, he appears to be euvolemic, had partial dialysis treatment because he insisted on discontinuation yesterday but he is stating that he wants to continue to dialysis and he does not want to consult any more short.    2.  Altered mental state, significantly improved  3.  Anemia and thrombocytopenia, hemoglobin today is 8.9 and the platelet is 89,000  4.  Diabetes mellitus type 2  5.  Possible UTI, difficult to interpret with ESRD and his low urine output  6.  COPD    Plan:  1.  Continue the same treatment  2.  Hemodialysis tomorrow  3.  Surveillance labs          Zeus Paulson MD  08/05/20  10:12      Electronically signed by Zeus Paulson MD at 08/05/20 1014           Dolores Forte, RN   Registered Nurse      Nursing Note   Signed   Date of Service:  08/05/20 1115   Creation Time:  08/05/20 1159            Signed             Pt yelled for staff. Nurse and nursing asst to rm. Pt very agitated and verbally abusive towards staff. Pt assisted to bathroom. Pt continues to be verbally abusive with staff and threatens to punch nurse in the face. Pt then resisted with staff, while  attemping to get him back in bed, trying to push staff away. Security was called, but staff were able to get pt back to bed and soft wrist restraints reapplied.  Brynn YBARRA notified.                  Eve Jacques, RN   Registered Nurse      Plan of Care   Signed   Date of Service:  08/05/20 0557   Creation Time:  08/05/20 0557            Signed                Problem: Patient Care Overview  Goal: Plan of Care Review  Flowsheets (Taken 8/5/2020 0534)  Progress: improving  Plan of Care Reviewed With: patient  Note:   Patient alert but disoriented to time and place, restraints maintained, dialysis ended half complete per patient request, nausea and vomiting twice this shift PRN Zofran given IV, blood pressure lows after dialysis patient asymptomatic, abdominal pain intermittent, continues to yell out into chamberlain frequently awake seeking staff vulgar language/insults throughout the night however approximately 4 am patient calm and offering apologies for his behavior, Access consult planned for today, will continue to monitor patient.

## 2020-08-05 NOTE — NURSING NOTE
Pt yelled for staff. Nurse and nursing asst to rm. Pt very agitated and verbally abusive towards staff. Pt assisted to bathroom. Pt continues to be verbally abusive with staff and threatens to punch nurse in the face. Pt then resisted with staff, while attemping to get him back in bed, trying to push staff away. Security was called, but staff were able to get pt back to bed and soft wrist restraints reapplied.  Brynn YBARRA notified.

## 2020-08-06 NOTE — PROGRESS NOTES
Continued Stay Note  [facility]     Patient Name: Lucien Avalos  MRN: [id number]  Today's Date: 8/6/2020    Admit Date: 8/1/2020    Discharge Plan     Row Name 08/06/20 1622       Plan    Plan  TBD    Plan Comments  Discharge plan since early this AM was patient was to go to Dialysis then transfer to [facility]. At 4PM today notification from [facility] that they will not be able to [facility] per Dr. Myers.  Patient cannot return to [facility] until out of restraints and is sitter free.  Discussed with Valeria MENDIOLA she will discuss further with [facility] manager. Will continue to monitor for new or changing discharge needs.        Discharge Codes    No documentation.       Expected Discharge Date and Time     Expected Discharge Date Expected Discharge Time    Aug 6, 2020             Alina Sierra RN

## 2020-08-06 NOTE — NURSING NOTE
Pt returned to floor from dialysis. Sitter accompanied pt. Sunny soft wrist in place. VSS. No complaints from pt at this time

## 2020-08-06 NOTE — PROGRESS NOTES
"   LOS: 5 days    Patient Care Team:  PersonMatilde MD as PCP - General (Internal Medicine)    Chief Complaint:    Chief Complaint   Patient presents with   • Altered Mental Status   • Hypotension     Follow-up end-stage renal  Subjective     Interval History:   On dialysis.  Calm,  Conversant.   In restraints with sitter at bedside. Wearing mask.   Says he is frustrated and wants to go home.  Knows he made threats of suicide yesterday.   Bowels moving .Eating well. Not soa.         Review of Systems:   As noted    Objective     Vital Signs  Temp:  [98.4 °F (36.9 °C)-99.6 °F (37.6 °C)] 99.3 °F (37.4 °C)  Heart Rate:  [] 98  Resp:  [16-18] 16  BP: ()/(52-72) 112/58    Flowsheet Rows      First Filed Value   Admission Height  170.2 cm (67\") Documented at 08/01/2020 1247   Admission Weight  71.7 kg (158 lb 1.6 oz) Documented at 08/01/2020 1210          I/O this shift:  In: 240 [P.O.:240]  Out: -   No intake/output data recorded.    Intake/Output Summary (Last 24 hours) at 8/6/2020 1302  Last data filed at 8/6/2020 0849  Gross per 24 hour   Intake 240 ml   Output --   Net 240 ml       Physical Exam:  General Appearance: On dialysis.  Qb 400. BP 96 systolic  Goal 600 .    Skin: warm and dry  HEENT: Pupils round reactive to light, nonicteric sclerae  Neck: No JVD.  Lungs: Clear to auscultation, no wheezing .  Heart: RRR, normal S1 and S2, no S3, no rub  Abdomen: soft, nontender,+ bs.  Extremities:RUE AVF patent .          Results Review:    Results from last 7 days   Lab Units 08/06/20  0604 08/05/20  0510 08/04/20  0544  08/02/20  0941  08/01/20  1214   SODIUM mmol/L 133* 135* 134*   < > 130*  --  136   POTASSIUM mmol/L 4.4 4.2 4.6   < > 3.7  --  4.0   CHLORIDE mmol/L 99 100 97*   < > 93*  --  96*   CO2 mmol/L 21.9* 22.4 25.1   < > 23.8  --  28.0   BUN mg/dL 56* 40* 47*   < > 29*  --  18   CREATININE mg/dL 8.29* 7.19* 7.66*   < > 5.62*   < > 4.06*   CALCIUM mg/dL 7.9* 7.9* 8.4*   < > 8.1*  --  7.8* "   BILIRUBIN mg/dL  --   --   --   --  0.7  --  1.0   ALK PHOS U/L  --   --   --   --  59  --  66   ALT (SGPT) U/L  --   --   --   --  14  --  15   AST (SGOT) U/L  --   --   --   --  19  --  19   GLUCOSE mg/dL 142* 157* 122*   < > 148*  --  152*    < > = values in this interval not displayed.       Estimated Creatinine Clearance: 8 mL/min (A) (by C-G formula based on SCr of 8.29 mg/dL (H)).    Results from last 7 days   Lab Units 08/06/20  0604 08/05/20  0510 08/04/20  0544 08/03/20  0940  08/01/20  1214   MAGNESIUM mg/dL  --   --   --  2.0  --  1.8   PHOSPHORUS mg/dL 6.8* 6.0* 8.1* 6.8*   < >  --     < > = values in this interval not displayed.             Results from last 7 days   Lab Units 08/06/20 0604 08/05/20  0510 08/04/20  0544 08/03/20  0940 08/02/20  0941   WBC 10*3/mm3 8.30 6.22 6.00 6.79 5.19   HEMOGLOBIN g/dL 8.9* 8.9* 9.1* 9.3* 9.6*   PLATELETS 10*3/mm3 78* 89* 93* 89* 90*       Results from last 7 days   Lab Units 08/01/20  1214   INR  1.08         Imaging Results (Last 24 Hours)     ** No results found for the last 24 hours. **          insulin lispro 0-7 Units Subcutaneous TID AC   OLANZapine zydis 10 mg Oral BID   sodium chloride 10 mL Intravenous Q12H          Medication Review:   Current Facility-Administered Medications   Medication Dose Route Frequency Provider Last Rate Last Dose   • acetaminophen (TYLENOL) tablet 650 mg  650 mg Oral Q4H PRN Rainer Stevenson MD   650 mg at 08/05/20 0429    Or   • acetaminophen (TYLENOL) 160 MG/5ML solution 650 mg  650 mg Oral Q4H PRN Rainer Stevenson MD        Or   • acetaminophen (TYLENOL) suppository 650 mg  650 mg Rectal Q4H PRN Rainer Stevenson MD       • albumin human 25 % IV SOLN 12.5 g  12.5 g Intravenous PRN Zeus Paulson MD       • albumin human 25 % IV SOLN 12.5 g  12.5 g Intravenous PRN Zeus Paulson MD       • albumin human 25 % IV SOLN 25 g  25 g Intravenous 4x Daily PRN Connor Vazquez MD       • dextrose (D50W) 25 g/ 50mL Intravenous  Solution 25 g  25 g Intravenous Q15 Min PRN Vince Otto MD       • dextrose (GLUTOSE) oral gel 15 g  15 g Oral Q15 Min PRN Vince Otto MD       • glucagon (human recombinant) (GLUCAGEN DIAGNOSTIC) injection 1 mg  1 mg Subcutaneous Q15 Min PRN Vince Otto MD       • insulin lispro (humaLOG) injection 0-7 Units  0-7 Units Subcutaneous TID AC Vince Otto MD   2 Units at 08/05/20 1730   • nitroglycerin (NITROSTAT) SL tablet 0.4 mg  0.4 mg Sublingual Q5 Min PRN Rainer Stevenson MD       • OLANZapine zydis (ZyPREXA) disintegrating tablet 10 mg  10 mg Oral BID Luis F Myers III, MD       • ondansetron (ZOFRAN) injection 4 mg  4 mg Intravenous Q6H PRN Rianer Stevenson MD   4 mg at 08/05/20 0402   • sodium chloride 0.9 % bolus 1,000 mL  1,000 mL Intravenous PRN Zeus Paulson MD       • sodium chloride 0.9 % bolus 1,000 mL  1,000 mL Intravenous PRN Zeus Paulson MD       • sodium chloride 0.9 % flush 10 mL  10 mL Intravenous PRN Rainer Stevenson MD       • sodium chloride 0.9 % flush 10 mL  10 mL Intravenous Q12H Rainer Stevenson MD   10 mL at 08/06/20 0831   • sodium chloride 0.9 % flush 10 mL  10 mL Intravenous PRN Rainer Stevenson MD           Assessment/Plan   1.  ESRD.  Dialysis today.  Minimal volume off .    2.  Altered mental state.  Psych addressing .  Scheduled zyprexa. [facility] after dialysis today .  3.  Anemia and thrombocytopenia.  Hg stable .  4.  Diabetes mellitus type 2  5.  Pyuria.  No growth on urine culture.       Plan:  1. Dialysis today.        Jasmin Dooley MD  08/06/20  13:02

## 2020-08-06 NOTE — NURSING NOTE
"Patient making suicidal comments to staff. Spoke with patient at length and also patient's RN, Eve. Patient states he is done with this \"shithole world\" and is ready to go. States he will have his girlfriend Dahlia \"leave a pill\" for him. States \"I will do it but I don't know where or when\". Asked about plan and patient states \"i'm not telling you\". States he doesn't want to have dialysis tomorrow. Patient appears very frustrated with his quality of life and being in a nursing home. Patient is in non-violent bilateral wrist restraints. Patient voicing wanting to die and leave this world several times and then states \"I wouldn't hurt myself\". Patient makes conflicting statements. Spoke with Dr. Myers and Dayanna Franklin,  re plan of care. Dr. Myers ordered sitter with patient and ordered to give the prn zyprexa.   "

## 2020-08-06 NOTE — CONSULTS
"The patient is unavailable for interview today as he is in dialysis.  Events have been noted.  It is notable that the patient did consent to dialysis this morning after  \"suicidal\" threats made yesterday afternoon.    I have increased the patient's Zyprexa dose to 10 mg twice daily, in hopes of addressing some of his problematic behaviors, though I suspect that these may be related to a difficult pre-existing personality style and thus may not necessarily be amenable to pharmacotherapeutic intervention.      "

## 2020-08-06 NOTE — DISCHARGE SUMMARY
Patient Name: Lucien Avalos  : 1945  MRN: [id number]    Date of Admission: 2020  Date of Discharge:  2020  Primary Care Physician: Matilde Hi MD      Chief Complaint:   Altered Mental Status and Hypotension      Discharge Diagnoses     Active Hospital Problems    Diagnosis  POA   • **Unresponsive [R41.89]  Unknown   • Acute metabolic encephalopathy [G93.41]  Yes   • COPD (chronic obstructive pulmonary disease) (CMS/formerly [facility]) [J44.9]  Yes   • Diabetes mellitus (CMS/formerly [facility]) [E11.9]  Yes   • Hypertension [I10]  Unknown   • ESRD (end stage renal disease) (CMS/formerly [facility]) [N18.6]  Yes   • UTI (urinary tract infection) [N39.0]  Unknown   • Elevated troponin [R79.89]  Unknown   • Anemia due to chronic kidney disease [N18.9, D63.1]  Unknown      Resolved Hospital Problems   No resolved problems to display.        Hospital Course     Mr. Avalos is a 75 y.o. male with a history of diabetes, hypertension, ESRD on HD, anemia and COPD who presented to [facility] initially unresponsive.  Please see the admitting history and physical for further details.  He was found to have likely vascular dementia and was admitted to the hospital for further evaluation and treatment.  Neurology and psychiatry saw in consultation.  Nephrology saw in consultation who managed patient's hemodialysis.  Patient remained pretty agitated throughout hospital stay requiring nonviolent restraints.  He was started on as needed Zyprexa that was switched to twice daily scheduled.  The patient became suicidal on 2020.  Patient deemed acceptable to be discharged to [facility] for further evaluation and treatment.  He is medically stable and will be discharged over there today.     Day of Discharge     patient suicidal overnight. will be transferred to [facility] today. tolerated dialysis.     Denies chest pain, palpitations, SOA, edema, fever, chills, nausea vomiting.    Physical Exam:  Temp:  [98 °F (36.7 °C)-99.6 °F (37.6 °C)] 98 °F  (36.7 °C)  Heart Rate:  [] 100  Resp:  [16-18] 16  BP: ()/(52-72) 109/57  Body mass index is 24.76 kg/m².  Physical Exam   Constitutional: He appears well-developed and well-nourished.   elderly, frail   HENT:   Head: Normocephalic and atraumatic.   Eyes: Conjunctivae and EOM are normal.   Neck: Neck supple. No JVD present.   Cardiovascular: Normal rate and regular rhythm.   Pulmonary/Chest: Effort normal and breath sounds normal.   Abdominal: Soft. Bowel sounds are normal. He exhibits no distension. There is no tenderness.   Musculoskeletal: Normal range of motion. He exhibits no edema.   Neurological: He is alert.   Skin: Skin is warm and dry.   Psychiatric: He has a normal mood and affect. His behavior is normal.   Nursing note and vitals reviewed.      Consultants     Consult Orders (all) (From admission, onward)     Start     Ordered    08/05/20 1325  Inpatient Psychiatrist Consult  Once     Specialty:  Psychiatry  Provider:  Luis F Myers III, MD    08/05/20 1325    08/03/20 0702  Inpatient Psychiatrist Consult  IN AM     Specialty:  Psychiatry  Provider:  Luis F Myers III, MD    08/02/20 1638    08/01/20 2123  Inpatient Neurology Consult General  Once     Specialty:  Neurology  Provider:  Naveen Olivares MD    08/01/20 2125 08/01/20 1914  Inpatient Nephrology Consult  Once     Specialty:  Nephrology  Provider:  Emory Mansfield MD    08/01/20 1915 08/01/20 1913  Inpatient Neurology Consult General  Once,   Status:  Canceled     Specialty:  Neurology  Provider:  Erasmo York MD    08/01/20 1915 08/01/20 1558  [facility] (on-call MD unless specified) Details  Once     Specialty:  Hospitalist  Provider:  (Not yet assigned)    08/01/20 1557    08/01/20 1214  Inpatient Neurology Consult Stroke  Once     Specialty:  Neurology  Provider:  (Not yet assigned)    08/01/20 1213    08/01/20 1214  Inpatient Neurology Consult Stroke  Once     Specialty:   Neurology  Provider:  (Not yet assigned)    08/01/20 1213              Procedures     Imaging Results (All)     Procedure Component Value Units Date/Time    MRI Brain Without Contrast [[id number]] Collected:  08/01/20 1514     Updated:  08/01/20 1546    Narrative:       MRI BRAIN WO CONTRAST-     INDICATIONS: Altered mental status, possible hemorrhage     TECHNIQUE: Multiplanar multisequence noncontrast magnetic resonance  imaging of the brain.     COMPARISON: CT from 08/01/2020     FINDINGS:     Several sequences are significantly degraded by motion artifact,  limiting the assessment.     The diffusion-weighted images show no restricted diffusion to suggest  acute infarct.     The midline structures appear unremarkable.     The brain parenchyma shows moderate periventricular white matter T2  FLAIR signal hyperintensities suggesting chronic small vessel ischemic  change in a patient this age.     Flow voids in the major arteries at the base of the brain appear  unremarkable. Left vertebral artery is dominant.     The paranasal sinuses, mastoid air cells, and orbits appear  unremarkable.       Impression:          No acute infarct. No findings to suggest hemorrhage at the midbrain,  although the exam is somewhat limited by motion artifact; the CT density  of concern on the earlier CT may represent vascular anomaly. Follow-up  evaluation with enhanced MRI may be helpful when/if possible, and close  follow-up CT could be obtained to ensure stability of the CT finding of  concern.     This report was finalized on 8/1/2020 3:43 PM by Dr. Jayjay Corbett M.D.       XR Chest 1 View [[id number]] Collected:  08/01/20 1325     Updated:  08/01/20 1337    Narrative:       XR CHEST 1 VW-     HISTORY: Male who is 75 years-old,  stroke     TECHNIQUE: Frontal view of the chest     COMPARISON: None available     FINDINGS: Heart is mildly enlarged. Mild prominence of vascular and  interstitial markings. Linear likely atelectasis or  scarring the left  midlung. No pleural effusion or pneumothorax. No acute osseous process.       Impression:       Small likely atelectasis or scarring. Follow-up as  clinically indicated.     This report was finalized on 8/1/2020 1:34 PM by Dr. Jayjay Corbett M.D.       CT Angiogram Head [[id number]] Collected:  08/01/20 1242     Updated:  08/01/20 1259    Narrative:       CT ANGIOGRAM HEAD AND NECK AND CT PERFUSION STUDY     CLINICAL HISTORY: Altered mental status.     Radiation dose reduction techniques were utilized, including automated  exposure control and exposure modulation based on body size. CT scan of  the head was obtained with 3 mm axial images without the use  of IV contrast. The patient underwent a CT  perfusion study with a dynamic bolus of IV contrast. Standard perfusion  maps were constructed. The patient then underwent a CT angiogram of the  head and neck with 1 mm axial images following the administration of IV  contrast. Sagittal, coronal, and 3-dimensional reconstructed images were  obtained.  Percent stenosis was assessed in accordance with NASCET  criteria.     FINDINGS:     NONCONTRAST HEAD CT: On the noncontrast head CT, a 3-4 mm hyperdense  focus at the midbrain, for example axial image 17 could be a small focus  of blood or vascular anomaly, further evaluation with MRI may be  helpful, close interval follow-up can characterize change. Moderate  periventricular hypodensities suggest chronic small vessel ischemic  change in a patient this age. Arterial calcifications are seen at the  base the brain.     No enhancing lesions of brain are noted following contrast material  administration.        CT PERFUSION STUDY:  No CBF (under 30%) deficit or perfusion abnormality  is demonstrated where the T MAX is greater than 6 seconds. The  calculated mismatch volume was 0 cc.     CTA HEAD and neck: The CT angiogram of the head and neck demonstrates  calcifications in the bilateral cervical and  intracranial carotid  arteries, with estimated 65% narrowing at the cavernous segment of the  right ICA, 75% narrowing at the origin of the cavernous segment of the  left ICA otherwise without high-grade stenosis (0-49% stenosis). About  20% narrowing at the origin of the left cervical ICA. Otherwise, no  hemodynamically significant focal stenosis, aneurysm, or dissection in  the cervical carotid or vertebral arteries, or in the arteries at the  base of the brain. The left P1 segment is diminutive, the left PCA being  at least partially supplied by the anterior circulation. The lower  aspect of the left vertebral artery is obscured by attenuation artifact  from densely opacified left subclavian vein, precluding its assessment.     Lobulated mucous versus mucosal lesion measuring 1.7 cm in the posterior  right aspect of the trachea at the level of the thoracic inlet, axial  image 65, follow-up or direct visualization recommended.     Cervical spondyloarthropathy is present with uncovertebral joint and  facet hypertrophy result in bilateral neuroforaminal narrowing, more  prominent on the right at C4/5, C5/6, and on the left at C3/4, C5/6.             Impression:             1. No perfusion abnormality to suggest completed or threatened acute  infarct.  2. No arterial cutoff. Narrowing at the bilateral ICA cavernous  segments, estimated at 65% on the right, 75% on the left.  3. A 3 to 4 mm hyperdense focus at the mid brain remains indeterminate,  could be a small focus of hemorrhage or vascular abnormality. Close  follow-up/further evaluation recommended.  4. Possible mucosal lesion in the trachea versus lobulated mucus.        The findings were discussed by telephone with Dr. Sanchez at 1235,  1248, 08/01/2020     This report was finalized on 8/1/2020 12:56 PM by Dr. Jayjay Corbett M.D.       CT Cerebral Perfusion With & Without Contrast [[id number]] Collected:  08/01/20 1242     Updated:  08/01/20 125     Narrative:       CT ANGIOGRAM HEAD AND NECK AND CT PERFUSION STUDY     CLINICAL HISTORY: Altered mental status.     Radiation dose reduction techniques were utilized, including automated  exposure control and exposure modulation based on body size. CT scan of  the head was obtained with 3 mm axial images without the use  of IV contrast. The patient underwent a CT  perfusion study with a dynamic bolus of IV contrast. Standard perfusion  maps were constructed. The patient then underwent a CT angiogram of the  head and neck with 1 mm axial images following the administration of IV  contrast. Sagittal, coronal, and 3-dimensional reconstructed images were  obtained.  Percent stenosis was assessed in accordance with NASCET  criteria.     FINDINGS:     NONCONTRAST HEAD CT: On the noncontrast head CT, a 3-4 mm hyperdense  focus at the midbrain, for example axial image 17 could be a small focus  of blood or vascular anomaly, further evaluation with MRI may be  helpful, close interval follow-up can characterize change. Moderate  periventricular hypodensities suggest chronic small vessel ischemic  change in a patient this age. Arterial calcifications are seen at the  base the brain.     No enhancing lesions of brain are noted following contrast material  administration.        CT PERFUSION STUDY:  No CBF (under 30%) deficit or perfusion abnormality  is demonstrated where the T MAX is greater than 6 seconds. The  calculated mismatch volume was 0 cc.     CTA HEAD and neck: The CT angiogram of the head and neck demonstrates  calcifications in the bilateral cervical and intracranial carotid  arteries, with estimated 65% narrowing at the cavernous segment of the  right ICA, 75% narrowing at the origin of the cavernous segment of the  left ICA otherwise without high-grade stenosis (0-49% stenosis). About  20% narrowing at the origin of the left cervical ICA. Otherwise, no  hemodynamically significant focal stenosis, aneurysm, or  dissection in  the cervical carotid or vertebral arteries, or in the arteries at the  base of the brain. The left P1 segment is diminutive, the left PCA being  at least partially supplied by the anterior circulation. The lower  aspect of the left vertebral artery is obscured by attenuation artifact  from densely opacified left subclavian vein, precluding its assessment.     Lobulated mucous versus mucosal lesion measuring 1.7 cm in the posterior  right aspect of the trachea at the level of the thoracic inlet, axial  image 65, follow-up or direct visualization recommended.     Cervical spondyloarthropathy is present with uncovertebral joint and  facet hypertrophy result in bilateral neuroforaminal narrowing, more  prominent on the right at C4/5, C5/6, and on the left at C3/4, C5/6.             Impression:             1. No perfusion abnormality to suggest completed or threatened acute  infarct.  2. No arterial cutoff. Narrowing at the bilateral ICA cavernous  segments, estimated at 65% on the right, 75% on the left.  3. A 3 to 4 mm hyperdense focus at the mid brain remains indeterminate,  could be a small focus of hemorrhage or vascular abnormality. Close  follow-up/further evaluation recommended.  4. Possible mucosal lesion in the trachea versus lobulated mucus.        The findings were discussed by telephone with Dr. Sanchez at 1235,  1248, 08/01/2020     This report was finalized on 8/1/2020 12:56 PM by Dr. Jayjay Corbett M.D.       CT Angiogram Neck [[id number]] Collected:  08/01/20 1242     Updated:  08/01/20 1259    Narrative:       CT ANGIOGRAM HEAD AND NECK AND CT PERFUSION STUDY     CLINICAL HISTORY: Altered mental status.     Radiation dose reduction techniques were utilized, including automated  exposure control and exposure modulation based on body size. CT scan of  the head was obtained with 3 mm axial images without the use  of IV contrast. The patient underwent a CT  perfusion study with a dynamic  bolus of IV contrast. Standard perfusion  maps were constructed. The patient then underwent a CT angiogram of the  head and neck with 1 mm axial images following the administration of IV  contrast. Sagittal, coronal, and 3-dimensional reconstructed images were  obtained.  Percent stenosis was assessed in accordance with NASCET  criteria.     FINDINGS:     NONCONTRAST HEAD CT: On the noncontrast head CT, a 3-4 mm hyperdense  focus at the midbrain, for example axial image 17 could be a small focus  of blood or vascular anomaly, further evaluation with MRI may be  helpful, close interval follow-up can characterize change. Moderate  periventricular hypodensities suggest chronic small vessel ischemic  change in a patient this age. Arterial calcifications are seen at the  base the brain.     No enhancing lesions of brain are noted following contrast material  administration.        CT PERFUSION STUDY:  No CBF (under 30%) deficit or perfusion abnormality  is demonstrated where the T MAX is greater than 6 seconds. The  calculated mismatch volume was 0 cc.     CTA HEAD and neck: The CT angiogram of the head and neck demonstrates  calcifications in the bilateral cervical and intracranial carotid  arteries, with estimated 65% narrowing at the cavernous segment of the  right ICA, 75% narrowing at the origin of the cavernous segment of the  left ICA otherwise without high-grade stenosis (0-49% stenosis). About  20% narrowing at the origin of the left cervical ICA. Otherwise, no  hemodynamically significant focal stenosis, aneurysm, or dissection in  the cervical carotid or vertebral arteries, or in the arteries at the  base of the brain. The left P1 segment is diminutive, the left PCA being  at least partially supplied by the anterior circulation. The lower  aspect of the left vertebral artery is obscured by attenuation artifact  from densely opacified left subclavian vein, precluding its assessment.     Lobulated mucous versus  mucosal lesion measuring 1.7 cm in the posterior  right aspect of the trachea at the level of the thoracic inlet, axial  image 65, follow-up or direct visualization recommended.     Cervical spondyloarthropathy is present with uncovertebral joint and  facet hypertrophy result in bilateral neuroforaminal narrowing, more  prominent on the right at C4/5, C5/6, and on the left at C3/4, C5/6.             Impression:             1. No perfusion abnormality to suggest completed or threatened acute  infarct.  2. No arterial cutoff. Narrowing at the bilateral ICA cavernous  segments, estimated at 65% on the right, 75% on the left.  3. A 3 to 4 mm hyperdense focus at the mid brain remains indeterminate,  could be a small focus of hemorrhage or vascular abnormality. Close  follow-up/further evaluation recommended.  4. Possible mucosal lesion in the trachea versus lobulated mucus.        The findings were discussed by telephone with Dr. Sanchez at 1235,  1248, 08/01/2020     This report was finalized on 8/1/2020 12:56 PM by Dr. Jayjay Corbett M.D.             Pertinent Labs     Results from last 7 days   Lab Units 08/06/20  0604 08/05/20 0510 08/04/20  0544 08/03/20  0940   WBC 10*3/mm3 8.30 6.22 6.00 6.79   HEMOGLOBIN g/dL 8.9* 8.9* 9.1* 9.3*   PLATELETS 10*3/mm3 78* 89* 93* 89*     Results from last 7 days   Lab Units 08/06/20  0604 08/05/20 0510 08/04/20  0544 08/03/20  0940   SODIUM mmol/L 133* 135* 134* 131*   POTASSIUM mmol/L 4.4 4.2 4.6 4.2   CHLORIDE mmol/L 99 100 97* 94*   CO2 mmol/L 21.9* 22.4 25.1 24.1   BUN mg/dL 56* 40* 47* 38*   CREATININE mg/dL 8.29* 7.19* 7.66* 7.16*   GLUCOSE mg/dL 142* 157* 122* 149*   Estimated Creatinine Clearance: 8 mL/min (A) (by C-G formula based on SCr of 8.29 mg/dL (H)).  Results from last 7 days   Lab Units 08/06/20  0604 08/05/20  0510 08/04/20  0544 08/03/20  0940 08/02/20  0941 08/01/20  1214   ALBUMIN g/dL 2.60* 2.80* 2.90* 3.00* 2.80* 3.20*   BILIRUBIN mg/dL  --   --   --    --  0.7 1.0   ALK PHOS U/L  --   --   --   --  59 66   AST (SGOT) U/L  --   --   --   --  19 19   ALT (SGPT) U/L  --   --   --   --  14 15     Results from last 7 days   Lab Units 08/06/20  0604 08/05/20  0510 08/04/20  0544 08/03/20  0940  08/01/20  1214   CALCIUM mg/dL 7.9* 7.9* 8.4* 8.3*   < > 7.8*   ALBUMIN g/dL 2.60* 2.80* 2.90* 3.00*   < > 3.20*   MAGNESIUM mg/dL  --   --   --  2.0  --  1.8   PHOSPHORUS mg/dL 6.8* 6.0* 8.1* 6.8*  --   --     < > = values in this interval not displayed.     Results from last 7 days   Lab Units 08/01/20  1307   AMMONIA umol/L 64*     Results from last 7 days   Lab Units 08/01/20  1729 08/01/20  1214   TROPONIN T ng/mL 0.129* 0.153*           Invalid input(s): LDLCALC  Results from last 7 days   Lab Units 08/01/20  1401   URINECX  No growth       Test Results Pending at Discharge       Discharge Details        Discharge Medications      New Medications      Instructions Start Date   OLANZapine zydis 10 MG disintegrating tablet  Commonly known as:  ZyPREXA   10 mg, Translingual, 2 times daily         Continue These Medications      Instructions Start Date   albuterol sulfate  (90 Base) MCG/ACT inhaler  Commonly known as:  PROVENTIL HFA;VENTOLIN HFA;PROAIR HFA   2 puffs, Inhalation, Every 6 Hours PRN      atorvastatin 40 MG tablet  Commonly known as:  LIPITOR   40 mg, Oral, Daily      clopidogrel 75 MG tablet  Commonly known as:  PLAVIX   75 mg, Oral, Daily      FLUoxetine 20 MG capsule  Commonly known as:  PROzac   20 mg, Oral, Daily      HYDROcodone-acetaminophen 5-325 MG per tablet  Commonly known as:  NORCO   1 tablet, Oral, Every 6 Hours PRN      hydrOXYzine 50 MG tablet  Commonly known as:  ATARAX   50 mg, Oral, Every 12 Hours      insulin detemir 100 UNIT/ML injection  Commonly known as:  LEVEMIR   30 Units, Subcutaneous, Daily      ipratropium-albuterol  MCG/ACT inhaler  Commonly known as:  COMBIVENT RESPIMAT   2 puffs, Inhalation, Every 8 Hours PRN         losartan 25 MG tablet  Commonly known as:  COZAAR   25 mg, Oral, Daily      midodrine 10 MG tablet  Commonly known as:  PROAMATINE   10 mg, Oral, Daily, Give before dialysis, on dialysis day (Mon, Wed, Fri)      pantoprazole 40 MG EC tablet  Commonly known as:  PROTONIX   40 mg, Oral, 2 times daily      PROMOD PO   30 mL, Oral, Daily      sucralfate 1 g tablet  Commonly known as:  CARAFATE   1 g, Oral, 4 Times Daily      temazepam 15 MG capsule  Commonly known as:  RESTORIL   15 mg, Oral, Nightly      Velphoro 500 MG chewable tablet  Generic drug:  Sucroferric Oxyhydroxide   1 tablet, Oral, 3 Times Daily             No Known Allergies      Discharge Disposition:  [facility] or [facility] (DC - External)    Discharge Diet:  Diet Order   Procedures   • Diet Soft Texture; Ground; Thin; Safe Tray       Discharge Activity:   Activity Instructions     Activity as Tolerated            CODE STATUS:    Code Status and Medical Interventions:   Ordered at: 08/01/20 1325     Limited Support to NOT Include:    Intubation    NIPPV (Non-Invasive Positive Pressure Support)     Level Of Support Discussed With:    Next of Kin (If No Surrogate)     Code Status:    No CPR     Medical Interventions (Level of Support Prior to Arrest):    Limited     Comments:    Sister Jb Ewing       No future appointments.  Additional Instructions for the Follow-ups that You Need to Schedule     Discharge Follow-up with PCP   As directed       Currently Documented PCP:    Matilde Hi MD    PCP Phone Number:    [phone]     Follow Up Details:  1-2 weeks           Follow-up Information     Matilde Hi MD .    Specialty:  Internal Medicine  Why:  1-2 weeks  Contact information:  [address] [facility] [address]  [place] [address]  [phone]                   Additional Instructions for the Follow-ups that You Need to Schedule     Discharge Follow-up with PCP   As directed       Currently Documented PCP:    Matilde Hi MD     PCP Phone Number:    [phone]     Follow Up Details:  1-2 weeks           Time Spent on Discharge:  Greater than 30 minutes      AMADA Darden  [facility]  08/06/20  4:02 PM

## 2020-08-06 NOTE — PLAN OF CARE
Problem: Restraint, Nonbehavioral (Nonviolent)  Goal: Rationale and Justification  Flowsheets (Taken 8/5/2020 1934 by Dolores Forte, RN)  Rationale and Justification: failure of less restrictive safety measures;other (see comments)     Problem: Restraint, Nonbehavioral (Nonviolent)  Goal: Nonbehavioral (Nonviolent) Restraint: Absence of Injury/Harm  Flowsheets (Taken 8/6/2020 0628)  Nonbehavioral (Nonviolent) Restraint: Absence of Injury/Harm: met     Problem: Restraint, Nonbehavioral (Nonviolent)  Goal: Nonbehavioral (Nonviolent) Restraint: Achievement of Discontinuation Criteria  Flowsheets (Taken 8/6/2020 0628)  Nonbehavioral (Nonviolent) Restraint: Achievement of Discontinuation Criteria: not met     Problem: Restraint, Nonbehavioral (Nonviolent)  Goal: Nonbehavioral (Nonviolent) Restraint: Preservation of Dignity and Wellbeing  Flowsheets (Taken 8/6/2020 0628)  Nonbehavioral (Nonviolent) Restraint: Preservation of Dignity and Wellbeing: met     Problem: Skin Injury Risk (Adult)  Goal: Identify Related Risk Factors and Signs and Symptoms  Flowsheets (Taken 8/4/2020 0527)  Related Risk Factors (Skin Injury Risk): mechanical forces;mobility impaired;moisture     Problem: Suicide Risk (Adult)  Goal: Identify Related Risk Factors and Signs and Symptoms  Flowsheets (Taken 8/6/2020 0628)  Related Risk Factors (Suicide Risk): threat to self-concept; stressful life event; chronic/terminal illness  Signs and Symptoms (Suicide Risk): anxiety; loss of control; impulsivity; despair     Problem: Patient Care Overview  Goal: Plan of Care Review  Flowsheets  Taken 8/6/2020 0630  Progress: no change  Taken 8/5/2020 2010  Plan of Care Reviewed With: patient  Note:   VSS, room air, sitter at bedside, restraints maintained, suicide precautions in place, sleeping between care, will continue to monitor

## 2020-08-07 NOTE — DISCHARGE SUMMARY
Patient Name: Lucien Avalos  : 1945  MRN: [id number]    Date of Admission: 2020  Date of Discharge:  2020  Primary Care Physician: Matilde Hi MD      Chief Complaint:   Altered Mental Status and Hypotension      Discharge Diagnoses     Active Hospital Problems    Diagnosis  POA   • **Unresponsive [R41.89]  Unknown   • Acute metabolic encephalopathy [G93.41]  Yes   • COPD (chronic obstructive pulmonary disease) (CMS/[facility]) [J44.9]  Yes   • Diabetes mellitus (CMS/[facility]) [E11.9]  Yes   • Hypertension [I10]  Unknown   • ESRD (end stage renal disease) (CMS/[facility]) [N18.6]  Yes   • UTI (urinary tract infection) [N39.0]  Unknown   • Elevated troponin [R79.89]  Unknown   • Anemia due to chronic kidney disease [N18.9, D63.1]  Unknown      Resolved Hospital Problems   No resolved problems to display.        Hospital Course     Mr. Avalos is a 75 y.o. male with a history of diabetes, hypertension, ESRD on HD, anemia and COPD who presented to [facility] initially unresponsive.  Please see the admitting history and physical for further details.  He was found to have likely vascular dementia and was admitted to the hospital for further evaluation and treatment.  Neurology and psychiatry saw in consultation.  Nephrology saw in consultation who managed patient's hemodialysis.  Patient remained pretty agitated throughout hospital stay requiring nonviolent restraints.  He was started on as needed Zyprexa that was switched to twice daily scheduled.  The patient became suicidal on 2020.  Patient deemed acceptable to be discharged to [facility] for further evaluation and treatment.  He is medically stable and will be discharged over there today.     ADDENDUM: no new events or changes overnight. patient discharge delayed due to lack of staff provided in the [facility]. He is stable to discharge today.     Day of Discharge     patient suicidal overnight. will be transferred to [facility] today. tolerated dialysis.       Denies chest pain, palpitations, SOA, edema, fever, chills, nausea vomiting.    Physical Exam:  Temp:  [98 °F (36.7 °C)-99.3 °F (37.4 °C)] 98.6 °F (37 °C)  Heart Rate:  [] 103  Resp:  [16-17] 16  BP: ()/(53-62) 101/62  Body mass index is 24.76 kg/m².  Physical Exam   Constitutional: He appears well-developed and well-nourished.   elderly, frail   HENT:   Head: Normocephalic and atraumatic.   Eyes: Conjunctivae and EOM are normal.   Neck: Neck supple. No JVD present.   Cardiovascular: Normal rate and regular rhythm.   Pulmonary/Chest: Effort normal and breath sounds normal.   Abdominal: Soft. Bowel sounds are normal. He exhibits no distension. There is no tenderness.   Musculoskeletal: Normal range of motion. He exhibits no edema.   Neurological: He is alert.   Skin: Skin is warm and dry.   Psychiatric: He has a normal mood and affect. His behavior is normal.   Nursing note and vitals reviewed.      Consultants     Consult Orders (all) (From admission, onward)     Start     Ordered    08/05/20 1325  Inpatient Psychiatrist Consult  Once     Specialty:  Psychiatry  Provider:  Luis F Myers III, MD    08/05/20 1325    08/01/20 1914  Inpatient Nephrology Consult  Once     Specialty:  Nephrology  Provider:  Emory Mansfield MD    08/01/20 1915    08/01/20 1214  Inpatient Neurology Consult Stroke  Once     Specialty:  Neurology  Provider:  (Not yet assigned)    08/01/20 1213              Procedures     Imaging Results (All)     Procedure Component Value Units Date/Time    MRI Brain Without Contrast [[id number]] Collected:  08/01/20 1514     Updated:  08/01/20 1546    Narrative:       MRI BRAIN WO CONTRAST-     INDICATIONS: Altered mental status, possible hemorrhage     TECHNIQUE: Multiplanar multisequence noncontrast magnetic resonance  imaging of the brain.     COMPARISON: CT from 08/01/2020     FINDINGS:     Several sequences are significantly degraded by motion artifact,  limiting the  assessment.     The diffusion-weighted images show no restricted diffusion to suggest  acute infarct.     The midline structures appear unremarkable.     The brain parenchyma shows moderate periventricular white matter T2  FLAIR signal hyperintensities suggesting chronic small vessel ischemic  change in a patient this age.     Flow voids in the major arteries at the base of the brain appear  unremarkable. Left vertebral artery is dominant.     The paranasal sinuses, mastoid air cells, and orbits appear  unremarkable.       Impression:          No acute infarct. No findings to suggest hemorrhage at the midbrain,  although the exam is somewhat limited by motion artifact; the CT density  of concern on the earlier CT may represent vascular anomaly. Follow-up  evaluation with enhanced MRI may be helpful when/if possible, and close  follow-up CT could be obtained to ensure stability of the CT finding of  concern.     This report was finalized on 8/1/2020 3:43 PM by Dr. Jayjay Corbett M.D.       XR Chest 1 View [[id number]] Collected:  08/01/20 1325     Updated:  08/01/20 1337    Narrative:       XR CHEST 1 VW-     HISTORY: Male who is 75 years-old,  stroke     TECHNIQUE: Frontal view of the chest     COMPARISON: None available     FINDINGS: Heart is mildly enlarged. Mild prominence of vascular and  interstitial markings. Linear likely atelectasis or scarring the left  midlung. No pleural effusion or pneumothorax. No acute osseous process.       Impression:       Small likely atelectasis or scarring. Follow-up as  clinically indicated.     This report was finalized on 8/1/2020 1:34 PM by Dr. Jayjay Corbett M.D.       CT Angiogram Head [[id number]] Collected:  08/01/20 1242     Updated:  08/01/20 1259    Narrative:       CT ANGIOGRAM HEAD AND NECK AND CT PERFUSION STUDY     CLINICAL HISTORY: Altered mental status.     Radiation dose reduction techniques were utilized, including automated  exposure control and  exposure modulation based on body size. CT scan of  the head was obtained with 3 mm axial images without the use  of IV contrast. The patient underwent a CT  perfusion study with a dynamic bolus of IV contrast. Standard perfusion  maps were constructed. The patient then underwent a CT angiogram of the  head and neck with 1 mm axial images following the administration of IV  contrast. Sagittal, coronal, and 3-dimensional reconstructed images were  obtained.  Percent stenosis was assessed in accordance with NASCET  criteria.     FINDINGS:     NONCONTRAST HEAD CT: On the noncontrast head CT, a 3-4 mm hyperdense  focus at the midbrain, for example axial image 17 could be a small focus  of blood or vascular anomaly, further evaluation with MRI may be  helpful, close interval follow-up can characterize change. Moderate  periventricular hypodensities suggest chronic small vessel ischemic  change in a patient this age. Arterial calcifications are seen at the  base the brain.     No enhancing lesions of brain are noted following contrast material  administration.        CT PERFUSION STUDY:  No CBF (under 30%) deficit or perfusion abnormality  is demonstrated where the T MAX is greater than 6 seconds. The  calculated mismatch volume was 0 cc.     CTA HEAD and neck: The CT angiogram of the head and neck demonstrates  calcifications in the bilateral cervical and intracranial carotid  arteries, with estimated 65% narrowing at the cavernous segment of the  right ICA, 75% narrowing at the origin of the cavernous segment of the  left ICA otherwise without high-grade stenosis (0-49% stenosis). About  20% narrowing at the origin of the left cervical ICA. Otherwise, no  hemodynamically significant focal stenosis, aneurysm, or dissection in  the cervical carotid or vertebral arteries, or in the arteries at the  base of the brain. The left P1 segment is diminutive, the left PCA being  at least partially supplied by the anterior  circulation. The lower  aspect of the left vertebral artery is obscured by attenuation artifact  from densely opacified left subclavian vein, precluding its assessment.     Lobulated mucous versus mucosal lesion measuring 1.7 cm in the posterior  right aspect of the trachea at the level of the thoracic inlet, axial  image 65, follow-up or direct visualization recommended.     Cervical spondyloarthropathy is present with uncovertebral joint and  facet hypertrophy result in bilateral neuroforaminal narrowing, more  prominent on the right at C4/5, C5/6, and on the left at C3/4, C5/6.             Impression:             1. No perfusion abnormality to suggest completed or threatened acute  infarct.  2. No arterial cutoff. Narrowing at the bilateral ICA cavernous  segments, estimated at 65% on the right, 75% on the left.  3. A 3 to 4 mm hyperdense focus at the mid brain remains indeterminate,  could be a small focus of hemorrhage or vascular abnormality. Close  follow-up/further evaluation recommended.  4. Possible mucosal lesion in the trachea versus lobulated mucus.        The findings were discussed by telephone with Dr. Sanchez at 1235,  1248, 08/01/2020     This report was finalized on 8/1/2020 12:56 PM by Dr. Jayjay Corbett M.D.       CT Cerebral Perfusion With & Without Contrast [[id number]] Collected:  08/01/20 1242     Updated:  08/01/20 1259    Narrative:       CT ANGIOGRAM HEAD AND NECK AND CT PERFUSION STUDY     CLINICAL HISTORY: Altered mental status.     Radiation dose reduction techniques were utilized, including automated  exposure control and exposure modulation based on body size. CT scan of  the head was obtained with 3 mm axial images without the use  of IV contrast. The patient underwent a CT  perfusion study with a dynamic bolus of IV contrast. Standard perfusion  maps were constructed. The patient then underwent a CT angiogram of the  head and neck with 1 mm axial images following the  administration of IV  contrast. Sagittal, coronal, and 3-dimensional reconstructed images were  obtained.  Percent stenosis was assessed in accordance with NASCET  criteria.     FINDINGS:     NONCONTRAST HEAD CT: On the noncontrast head CT, a 3-4 mm hyperdense  focus at the midbrain, for example axial image 17 could be a small focus  of blood or vascular anomaly, further evaluation with MRI may be  helpful, close interval follow-up can characterize change. Moderate  periventricular hypodensities suggest chronic small vessel ischemic  change in a patient this age. Arterial calcifications are seen at the  base the brain.     No enhancing lesions of brain are noted following contrast material  administration.        CT PERFUSION STUDY:  No CBF (under 30%) deficit or perfusion abnormality  is demonstrated where the T MAX is greater than 6 seconds. The  calculated mismatch volume was 0 cc.     CTA HEAD and neck: The CT angiogram of the head and neck demonstrates  calcifications in the bilateral cervical and intracranial carotid  arteries, with estimated 65% narrowing at the cavernous segment of the  right ICA, 75% narrowing at the origin of the cavernous segment of the  left ICA otherwise without high-grade stenosis (0-49% stenosis). About  20% narrowing at the origin of the left cervical ICA. Otherwise, no  hemodynamically significant focal stenosis, aneurysm, or dissection in  the cervical carotid or vertebral arteries, or in the arteries at the  base of the brain. The left P1 segment is diminutive, the left PCA being  at least partially supplied by the anterior circulation. The lower  aspect of the left vertebral artery is obscured by attenuation artifact  from densely opacified left subclavian vein, precluding its assessment.     Lobulated mucous versus mucosal lesion measuring 1.7 cm in the posterior  right aspect of the trachea at the level of the thoracic inlet, axial  image 65, follow-up or direct visualization  recommended.     Cervical spondyloarthropathy is present with uncovertebral joint and  facet hypertrophy result in bilateral neuroforaminal narrowing, more  prominent on the right at C4/5, C5/6, and on the left at C3/4, C5/6.             Impression:             1. No perfusion abnormality to suggest completed or threatened acute  infarct.  2. No arterial cutoff. Narrowing at the bilateral ICA cavernous  segments, estimated at 65% on the right, 75% on the left.  3. A 3 to 4 mm hyperdense focus at the mid brain remains indeterminate,  could be a small focus of hemorrhage or vascular abnormality. Close  follow-up/further evaluation recommended.  4. Possible mucosal lesion in the trachea versus lobulated mucus.        The findings were discussed by telephone with Dr. Sanchez at 1235,  1248, 08/01/2020     This report was finalized on 8/1/2020 12:56 PM by Dr. Jayjay Corbett M.D.       CT Angiogram Neck [[id number]] Collected:  08/01/20 1242     Updated:  08/01/20 1259    Narrative:       CT ANGIOGRAM HEAD AND NECK AND CT PERFUSION STUDY     CLINICAL HISTORY: Altered mental status.     Radiation dose reduction techniques were utilized, including automated  exposure control and exposure modulation based on body size. CT scan of  the head was obtained with 3 mm axial images without the use  of IV contrast. The patient underwent a CT  perfusion study with a dynamic bolus of IV contrast. Standard perfusion  maps were constructed. The patient then underwent a CT angiogram of the  head and neck with 1 mm axial images following the administration of IV  contrast. Sagittal, coronal, and 3-dimensional reconstructed images were  obtained.  Percent stenosis was assessed in accordance with NASCET  criteria.     FINDINGS:     NONCONTRAST HEAD CT: On the noncontrast head CT, a 3-4 mm hyperdense  focus at the midbrain, for example axial image 17 could be a small focus  of blood or vascular anomaly, further evaluation with MRI may  be  helpful, close interval follow-up can characterize change. Moderate  periventricular hypodensities suggest chronic small vessel ischemic  change in a patient this age. Arterial calcifications are seen at the  base the brain.     No enhancing lesions of brain are noted following contrast material  administration.        CT PERFUSION STUDY:  No CBF (under 30%) deficit or perfusion abnormality  is demonstrated where the T MAX is greater than 6 seconds. The  calculated mismatch volume was 0 cc.     CTA HEAD and neck: The CT angiogram of the head and neck demonstrates  calcifications in the bilateral cervical and intracranial carotid  arteries, with estimated 65% narrowing at the cavernous segment of the  right ICA, 75% narrowing at the origin of the cavernous segment of the  left ICA otherwise without high-grade stenosis (0-49% stenosis). About  20% narrowing at the origin of the left cervical ICA. Otherwise, no  hemodynamically significant focal stenosis, aneurysm, or dissection in  the cervical carotid or vertebral arteries, or in the arteries at the  base of the brain. The left P1 segment is diminutive, the left PCA being  at least partially supplied by the anterior circulation. The lower  aspect of the left vertebral artery is obscured by attenuation artifact  from densely opacified left subclavian vein, precluding its assessment.     Lobulated mucous versus mucosal lesion measuring 1.7 cm in the posterior  right aspect of the trachea at the level of the thoracic inlet, axial  image 65, follow-up or direct visualization recommended.     Cervical spondyloarthropathy is present with uncovertebral joint and  facet hypertrophy result in bilateral neuroforaminal narrowing, more  prominent on the right at C4/5, C5/6, and on the left at C3/4, C5/6.             Impression:             1. No perfusion abnormality to suggest completed or threatened acute  infarct.  2. No arterial cutoff. Narrowing at the bilateral ICA  cavernous  segments, estimated at 65% on the right, 75% on the left.  3. A 3 to 4 mm hyperdense focus at the mid brain remains indeterminate,  could be a small focus of hemorrhage or vascular abnormality. Close  follow-up/further evaluation recommended.  4. Possible mucosal lesion in the trachea versus lobulated mucus.        The findings were discussed by telephone with Dr. Sanchez at 1235,  1248, 08/01/2020     This report was finalized on 8/1/2020 12:56 PM by Dr. Jayjay Corbett M.D.             Pertinent Labs     Results from last 7 days   Lab Units 08/06/20  0604 08/05/20  0510 08/04/20  0544 08/03/20  0940   WBC 10*3/mm3 8.30 6.22 6.00 6.79   HEMOGLOBIN g/dL 8.9* 8.9* 9.1* 9.3*   PLATELETS 10*3/mm3 78* 89* 93* 89*     Results from last 7 days   Lab Units 08/06/20  0604 08/05/20  0510 08/04/20  0544 08/03/20  0940   SODIUM mmol/L 133* 135* 134* 131*   POTASSIUM mmol/L 4.4 4.2 4.6 4.2   CHLORIDE mmol/L 99 100 97* 94*   CO2 mmol/L 21.9* 22.4 25.1 24.1   BUN mg/dL 56* 40* 47* 38*   CREATININE mg/dL 8.29* 7.19* 7.66* 7.16*   GLUCOSE mg/dL 142* 157* 122* 149*   Estimated Creatinine Clearance: 8 mL/min (A) (by C-G formula based on SCr of 8.29 mg/dL (H)).  Results from last 7 days   Lab Units 08/06/20  0604 08/05/20  0510 08/04/20  0544 08/03/20  0940 08/02/20  0941 08/01/20  1214   ALBUMIN g/dL 2.60* 2.80* 2.90* 3.00* 2.80* 3.20*   BILIRUBIN mg/dL  --   --   --   --  0.7 1.0   ALK PHOS U/L  --   --   --   --  59 66   AST (SGOT) U/L  --   --   --   --  19 19   ALT (SGPT) U/L  --   --   --   --  14 15     Results from last 7 days   Lab Units 08/06/20  0604 08/05/20  0510 08/04/20  0544 08/03/20  0940  08/01/20  1214   CALCIUM mg/dL 7.9* 7.9* 8.4* 8.3*   < > 7.8*   ALBUMIN g/dL 2.60* 2.80* 2.90* 3.00*   < > 3.20*   MAGNESIUM mg/dL  --   --   --  2.0  --  1.8   PHOSPHORUS mg/dL 6.8* 6.0* 8.1* 6.8*  --   --     < > = values in this interval not displayed.     Results from last 7 days   Lab Units 08/01/20  5637      AMMONIA umol/L 64*     Results from last 7 days   Lab Units 08/01/20  1729 08/01/20  1214   TROPONIN T ng/mL 0.129* 0.153*           Invalid input(s): LDLCALC  Results from last 7 days   Lab Units 08/01/20  1401   URINECX  No growth       Test Results Pending at Discharge       Discharge Details        Discharge Medications      New Medications      Instructions Start Date   OLANZapine zydis 10 MG disintegrating tablet  Commonly known as:  ZyPREXA   10 mg, Translingual, 2 times daily         Continue These Medications      Instructions Start Date   albuterol sulfate  (90 Base) MCG/ACT inhaler  Commonly known as:  PROVENTIL HFA;VENTOLIN HFA;PROAIR HFA   2 puffs, Inhalation, Every 6 Hours PRN      atorvastatin 40 MG tablet  Commonly known as:  LIPITOR   40 mg, Oral, Daily      clopidogrel 75 MG tablet  Commonly known as:  PLAVIX   75 mg, Oral, Daily      FLUoxetine 20 MG capsule  Commonly known as:  PROzac   20 mg, Oral, Daily      HYDROcodone-acetaminophen 5-325 MG per tablet  Commonly known as:  NORCO   1 tablet, Oral, Every 6 Hours PRN      hydrOXYzine 50 MG tablet  Commonly known as:  ATARAX   50 mg, Oral, Every 12 Hours      insulin detemir 100 UNIT/ML injection  Commonly known as:  LEVEMIR   30 Units, Subcutaneous, Daily      ipratropium-albuterol  MCG/ACT inhaler  Commonly known as:  COMBIVENT RESPIMAT   2 puffs, Inhalation, Every 8 Hours PRN      losartan 25 MG tablet  Commonly known as:  COZAAR   25 mg, Oral, Daily      midodrine 10 MG tablet  Commonly known as:  PROAMATINE   10 mg, Oral, Daily, Give before dialysis, on dialysis day (Mon, Wed, Fri)      pantoprazole 40 MG EC tablet  Commonly known as:  PROTONIX   40 mg, Oral, 2 times daily      PROMOD PO   30 mL, Oral, Daily      sucralfate 1 g tablet  Commonly known as:  CARAFATE   1 g, Oral, 4 Times Daily      temazepam 15 MG capsule  Commonly known as:  RESTORIL   15 mg, Oral, Nightly      Velphoro 500 MG chewable tablet  Generic drug:   Sucroferric Oxyhydroxide   1 tablet, Oral, 3 Times Daily             No Known Allergies      Discharge Disposition:  [facility] Hospital or Unit (DC - External)    Discharge Diet:  Diet Order   Procedures   • Diet Soft Texture; Ground; Thin; Safe Tray       Discharge Activity:   Activity Instructions     Activity as Tolerated            CODE STATUS:    Code Status and Medical Interventions:   Ordered at: 08/01/20 1325     Limited Support to NOT Include:    Intubation    NIPPV (Non-Invasive Positive Pressure Support)     Level Of Support Discussed With:    Next of Kin (If No Surrogate)     Code Status:    No CPR     Medical Interventions (Level of Support Prior to Arrest):    Limited     Comments:    Sister Jb Ewing       No future appointments.  Additional Instructions for the Follow-ups that You Need to Schedule     Discharge Follow-up with PCP   As directed       Currently Documented PCP:    Matilde Hi MD    PCP Phone Number:    [phone]     Follow Up Details:  1-2 weeks           Follow-up Information     Matilde Hi MD .    Specialty:  Internal Medicine  Why:  1-2 weeks  Contact information:  [address] [facility] [address]  [place] [address]  [phone]                   Additional Instructions for the Follow-ups that You Need to Schedule     Discharge Follow-up with PCP   As directed       Currently Documented PCP:    Matilde Hi MD    PCP Phone Number:    [phone]     Follow Up Details:  1-2 weeks           Time Spent on Discharge:  Greater than 30 minutes      AMADA Darden  [facility]  08/07/20  4:02 PM

## 2020-08-07 NOTE — PROGRESS NOTES
"   LOS: 6 days    Patient Care Team:  Matilde Hi MD as PCP - General (Internal Medicine)    Chief Complaint:    Chief Complaint   Patient presents with   • Altered Mental Status   • Hypotension     Follow-up end-stage renal  Subjective     Interval History:   The patient is much more alert and awake he is oriented x3, no chest pain or shortness of air, no nausea or vomiting, he had dialysis yesterday without difficulties.      Review of Systems:   As noted above    Objective     Vital Signs  Temp:  [98 °F (36.7 °C)-99.3 °F (37.4 °C)] 98.6 °F (37 °C)  Heart Rate:  [] 103  Resp:  [16-17] 16  BP: ()/(53-62) 101/62    Flowsheet Rows      First Filed Value   Admission Height  170.2 cm (67\") Documented at 08/01/2020 1247   Admission Weight  71.7 kg (158 lb 1.6 oz) Documented at 08/01/2020 1210          I/O this shift:  In: 480 [P.O.:480]  Out: -   I/O last 3 completed shifts:  In: 480 [P.O.:480]  Out: 1000 [Other:1000]    Intake/Output Summary (Last 24 hours) at 8/7/2020 1044  Last data filed at 8/7/2020 0900  Gross per 24 hour   Intake 720 ml   Output 1000 ml   Net -280 ml       Physical Exam:  General Appearance: Awake, alert and oriented x3, no acute distress, appears to be chronically ill, he has soft wrist restraint of the right wrist  Skin: warm and dry  HEENT: Pupils round reactive to light, nonicteric sclerae  Neck: No JVD, no carotid  Lungs: CTA, unlabored breathing effort  Heart: RRR, normal S1 and S2, no S3, no rub  Abdomen: soft, nontender,  present bowel sounds to auscultation  : no palpable bladder,  Extremities: no edema, cyanosis or clubbing, functional AV fistula in the right upper  Neuro: Normal speech and mental status, moving all extremities        Results Review:    Results from last 7 days   Lab Units 08/06/20  0604 08/05/20  0510 08/04/20  0544  08/02/20  0941  08/01/20  1214   SODIUM mmol/L 133* 135* 134*   < > 130*  --  136   POTASSIUM mmol/L 4.4 4.2 4.6   < > 3.7  --  4.0 "   CHLORIDE mmol/L 99 100 97*   < > 93*  --  96*   CO2 mmol/L 21.9* 22.4 25.1   < > 23.8  --  28.0   BUN mg/dL 56* 40* 47*   < > 29*  --  18   CREATININE mg/dL 8.29* 7.19* 7.66*   < > 5.62*   < > 4.06*   CALCIUM mg/dL 7.9* 7.9* 8.4*   < > 8.1*  --  7.8*   BILIRUBIN mg/dL  --   --   --   --  0.7  --  1.0   ALK PHOS U/L  --   --   --   --  59  --  66   ALT (SGPT) U/L  --   --   --   --  14  --  15   AST (SGOT) U/L  --   --   --   --  19  --  19   GLUCOSE mg/dL 142* 157* 122*   < > 148*  --  152*    < > = values in this interval not displayed.       Estimated Creatinine Clearance: 8 mL/min (A) (by C-G formula based on SCr of 8.29 mg/dL (H)).    Results from last 7 days   Lab Units 08/06/20  0604 08/05/20  0510 08/04/20  0544 08/03/20  0940  08/01/20  1214   MAGNESIUM mg/dL  --   --   --  2.0  --  1.8   PHOSPHORUS mg/dL 6.8* 6.0* 8.1* 6.8*   < >  --     < > = values in this interval not displayed.             Results from last 7 days   Lab Units 08/06/20  0604 08/05/20  0510 08/04/20  0544 08/03/20  0940 08/02/20  0941   WBC 10*3/mm3 8.30 6.22 6.00 6.79 5.19   HEMOGLOBIN g/dL 8.9* 8.9* 9.1* 9.3* 9.6*   PLATELETS 10*3/mm3 78* 89* 93* 89* 90*       Results from last 7 days   Lab Units 08/01/20  1214   INR  1.08         Imaging Results (Last 24 Hours)     ** No results found for the last 24 hours. **          insulin lispro 0-7 Units Subcutaneous TID AC   OLANZapine zydis 10 mg Oral BID   sodium chloride 10 mL Intravenous Q12H          Medication Review:   Current Facility-Administered Medications   Medication Dose Route Frequency Provider Last Rate Last Dose   • acetaminophen (TYLENOL) tablet 650 mg  650 mg Oral Q4H PRN Rainer Stevenson MD   650 mg at 08/05/20 0429    Or   • acetaminophen (TYLENOL) 160 MG/5ML solution 650 mg  650 mg Oral Q4H PRN Rainer Stevenson MD        Or   • acetaminophen (TYLENOL) suppository 650 mg  650 mg Rectal Q4H PRN Rainer Stevenson MD       • albumin human 25 % IV SOLN 25 g  25 g Intravenous 4x Daily  PRN Connor Vazquez MD       • dextrose (D50W) 25 g/ 50mL Intravenous Solution 25 g  25 g Intravenous Q15 Min PRN Vince Otto MD       • dextrose (GLUTOSE) oral gel 15 g  15 g Oral Q15 Min PRN Vince Otto MD       • glucagon (human recombinant) (GLUCAGEN DIAGNOSTIC) injection 1 mg  1 mg Subcutaneous Q15 Min PRN Vince Otto MD       • insulin lispro (humaLOG) injection 0-7 Units  0-7 Units Subcutaneous TID AC Vince Otto MD   2 Units at 08/07/20 0855   • nitroglycerin (NITROSTAT) SL tablet 0.4 mg  0.4 mg Sublingual Q5 Min PRN Rainer Stevenson MD       • OLANZapine zydis (ZyPREXA) disintegrating tablet 10 mg  10 mg Oral BID Luis F Myers III, MD   10 mg at 08/07/20 0856   • ondansetron (ZOFRAN) injection 4 mg  4 mg Intravenous Q6H PRN Rainer Stevenson MD   4 mg at 08/05/20 0402   • sodium chloride 0.9 % flush 10 mL  10 mL Intravenous PRN Rainer Stevenson MD       • sodium chloride 0.9 % flush 10 mL  10 mL Intravenous Q12H Rainer Stevenson MD   10 mL at 08/07/20 0856   • sodium chloride 0.9 % flush 10 mL  10 mL Intravenous PRN Rainer Stevenson MD           Assessment/Plan   1.  End-stage renal disease, on maintenance hemodialysis on dialysis every Tuesday, Thursday and Saturday, had dialysis yesterday without any difficulty  2.  Altered mental state, significantly improved  3.  Anemia and thrombocytopenia, hemoglobin yesterday was 8.9 and the platelet is 89,000  4.  Diabetes mellitus type 2  5.  Possible UTI, difficult to interpret with ESRD and his low urine output  6.  COPD    Plan:  1.  Continue the same treatment  2.  Hemodialysis tomorrow  3.  Surveillance labs          Zeus Paulson MD  08/07/20  10:44

## 2020-08-07 NOTE — CONSULTS
I reviewed Dr. Paulson's note from earlier today, and have interviewed the patient today.  Dr. Paulson notes significantly improved mental status.  The patient is currently oriented to person and place and can identify the [place] president.  He is not combative and is cooperative during today's interview.  He complied with yesterday's dialysis without incident.    He is not a candidate for psychiatric admission given these developments.  I would suggest that he be tried without restraints and sitter to facilitate the placement process.

## 2020-08-07 NOTE — NURSING NOTE
"Dr. Myers followed up w/ pt this afternoon and reported that pt is not a candidate for psychiatric admission. He stated pt should be tried w/out sitter and restraints. See note.     Wanda Shaffer, [facility] nurse manager saw pt today and spoke with CCP regarding discontinuation of restraints and 1:1 sitter.     This RN assessed pt in relation to suicidality. Pt lying in bed with sitter at bedside. Pt denied suicidal ideation and stated that he did not want to hurt himself or others. Pt states that he feels safe and will not harm himself either in the hospital or upon discharge. Pt states that he was making previous statements out of frustration because someone said \"that I had threatened them\". He said his statements were \"idle chit chat\".     Pt does not appear to be a danger to himself at this time. Informed pt's LAWANDA Katz and [facility] nurse manager.    AC will continue to follow until pt is discharged.   "

## 2020-08-07 NOTE — NURSING NOTE
"Patient alert and oriented overnight, sitter at bedside, patient agitated, verbal insults to sitter, patient demanding to sit on toilet for bowel movement, demanding to be released from restraints, swinging fist at sitter when near, continues to comment he is \"checking out of life\".   "

## 2020-08-07 NOTE — PLAN OF CARE
Problem: Restraint, Nonbehavioral (Nonviolent)  Goal: Rationale and Justification  Flowsheets (Taken 8/7/2020 0542)  Rationale and Justification: prevent harm to self; prevent line/tube removal; failure of less restrictive safety measures     Problem: Restraint, Nonbehavioral (Nonviolent)  Goal: Nonbehavioral (Nonviolent) Restraint: Absence of Injury/Harm  Flowsheets (Taken 8/7/2020 0542)  Nonbehavioral (Nonviolent) Restraint: Absence of Injury/Harm: met     Problem: Restraint, Nonbehavioral (Nonviolent)  Goal: Nonbehavioral (Nonviolent) Restraint: Achievement of Discontinuation Criteria  Flowsheets (Taken 8/7/2020 0542)  Nonbehavioral (Nonviolent) Restraint: Achievement of Discontinuation Criteria: not met     Problem: Restraint, Nonbehavioral (Nonviolent)  Goal: Nonbehavioral (Nonviolent) Restraint: Preservation of Dignity and Wellbeing  Flowsheets (Taken 8/7/2020 0542)  Nonbehavioral (Nonviolent) Restraint: Preservation of Dignity and Wellbeing: met

## 2020-08-07 NOTE — PROGRESS NOTES
Continued Stay Note  [facility]     Patient Name: Lucien Avalos  MRN: [id number]  Today's Date: 8/7/2020    Admit Date: 8/1/2020    Discharge Plan     Row Name 08/07/20 1249       Plan    Plan  --    Patient/Family in Agreement with Plan  yes    Plan Comments  Per BRISEIDA Wolfe Holly/Brandee needs the patient to be out of restraints and without a sitter for >/= 24 hours. [facility] spoke with the patient's primary nurse/Gianna, [facility] Manager/Annamarie, and [facility] Manager in detail regarding the patient's discharge plan. Nurse/LAWANDA Katz will discontinue restraints and the sitter. Therefore the patient will be eligible for discharge to [facility] as early as tomorrow. [facility] updated [facility] Director/Kirsty. Also updated with Nallely/Brandee who said she can accept the patient tomorrow so long as the patient is out of restraints and without a sitter for >/= 24 hours. Patient will likely need an ambulance. [facility] is following this case closely. --LAWANDA Mattson        Discharge Codes    No documentation.                   Milly Bartlett RN

## 2020-08-08 NOTE — NURSING NOTE
Spoke with Dr. Garrett, made aware of 8/10 chest pain after 3 nitro and morphine given, troponin increase and EKG changes. Pt looks comfortable in bed, no diaphoresis or radiating pain. Able to converse with staff easily and making jokes. Pt to be moved to tele floor, baby aspirin ordered, nitro paste ordered for chest pain, and cardiology to see early this morning. Pt transported to telemetry unit with 2 RN.

## 2020-08-08 NOTE — PROGRESS NOTES
[facility]               ASSOCIATES     LOS: 7 days     Name: Lucien Avalos  Age: 75 y.o.  Sex: male  :  1945  MRN: [id number]         Primary Care Physician: Matilde Hi MD    NPO Diet    Subjective   Patient seen.     Objective   Temp:  [97.3 °F (36.3 °C)-98.9 °F (37.2 °C)] 97.3 °F (36.3 °C)  Heart Rate:  [] 93  Resp:  [14-18] 16  BP: ()/(44-71) 127/56  SpO2:  [92 %-100 %] 95 %  on  Flow (L/min):  [2] 2;   Device (Oxygen Therapy): room air  Body mass index is 25.97 kg/m².    Physical Exam   - Physical Exam  General appearance: awake  Head/Eyes: atraumatic, clear cornea, EOMI, normal conjunctiva/sclera, normal eyelids/periorb., normocephalic, PERRL  ENT: moist mucosal membranes, normal dentition, normal nose, normal pharynx, normal sinus  Neck: full range of motion, non-tender, normal thyroid, supple/no meningismus, no bruit/NL carotids, no JVD, no masses or swelling  Cardiovascular: normal capillary refill, regular rate & rhythm  Respiratory: clear to auscultation, no distress  Abdomen: non-tender, normal bowel sounds, soft, no distention, no guarding, no hernia, no mass/organomegaly, no rebound  Rectal: not indicated  Extremities: moves all, normal capillary refill, normal range of motion, no edema  Musculoskeletal: normal inspection  Neuro/CNS: alert, oriented X 3    Reviewed medications and new clinical results        Results from last 7 days   Lab Units 20  0427 20  0604 20  0510 20  0544 20  0940 20  0941   WBC 10*3/mm3 6.04 8.30 6.22 6.00 6.79 5.19   HEMOGLOBIN g/dL 8.5* 8.9* 8.9* 9.1* 9.3* 9.6*   PLATELETS 10*3/mm3 97* 78* 89* 93* 89* 90*     Results from last 7 days   Lab Units 20  0604 20  0510 20  0544 20  0940 20  0941   SODIUM mmol/L 140 133* 135* 134* 131* 130*   POTASSIUM mmol/L 4.9 4.4 4.2 4.6 4.2 3.7   CHLORIDE mmol/L 102 99 100 97* 94* 93*   CO2 mmol/L 24.6  21.9* 22.4 25.1 24.1 23.8   BUN mg/dL 58* 56* 40* 47* 38* 29*   CREATININE mg/dL 6.99* 8.29* 7.19* 7.66* 7.16* 5.62*   CALCIUM mg/dL 8.0* 7.9* 7.9* 8.4* 8.3* 8.1*   GLUCOSE mg/dL 190* 142* 157* 122* 149* 148*     Lab Results   Component Value Date    ANIONGAP 13.4 08/08/2020     Glucose   Date/Time Value Ref Range Status   08/08/2020 1046 169 (H) 70 - 130 mg/dL Final   08/08/2020 0525 223 (H) 70 - 130 mg/dL Final   08/07/2020 2124 268 (H) 70 - 130 mg/dL Final   08/07/2020 1616 282 (H) 70 - 130 mg/dL Final   08/07/2020 1128 224 (H) 70 - 130 mg/dL Final   08/07/2020 0840 160 (H) 70 - 130 mg/dL Final   08/06/2020 2043 216 (H) 70 - 130 mg/dL Final   08/06/2020 1714 141 (H) 70 - 130 mg/dL Final     No results found for: HGBA1C  Estimated Creatinine Clearance: 10 mL/min (A) (by C-G formula based on SCr of 6.99 mg/dL (H)).    Lab Results   Component Value Date    COVID19 Not Detected 08/01/2020     Assessment/Plan   Active Hospital Problems    Diagnosis  POA   • **Unresponsive [R41.89]  Unknown   • Acute metabolic encephalopathy [G93.41]  Yes   • COPD (chronic obstructive pulmonary disease) (CMS/[facility]) [J44.9]  Yes   • Diabetes mellitus (CMS/[facility]) [E11.9]  Yes   • Hypertension [I10]  Unknown   • ESRD (end stage renal disease) (CMS/[facility]) [N18.6]  Yes   • UTI (urinary tract infection) [N39.0]  Unknown   • Elevated troponin [R79.89]  Unknown   • Anemia due to chronic kidney disease [N18.9, D63.1]  Unknown      Resolved Hospital Problems   No resolved problems to display.     75 y.o. male      Assessment and plan  1. Acute metabolic encephalopathy, continue to monitor for mental status changes.   2.  Increased agitation, monitor for safety.  3.  Urinary tract infection, he has received Rocephin, follow urine cultures.  4.  End-stage renal disease, continue dialysis per nephrology recommendations.  5.  COPD, chronic and stable.  6.  Hyponatremia, management per nephrology recommendations.  7.  Further plans based on hospital  course.    Vince Otto MD   08/08/20  19:44

## 2020-08-08 NOTE — NURSING NOTE
Pt transferred fr [facility] to [facility] r/t chest pain. Pt is alert with confusion. Per patient pain is 8 from 10 earlier. Informed pt that Cardio was consulted and place NPO order. Will continue to monitor.

## 2020-08-08 NOTE — PROGRESS NOTES
"   LOS: 7 days    Patient Care Team:  Matilde Hi MD as PCP - General (Internal Medicine)    Chief Complaint:    Chief Complaint   Patient presents with   • Altered Mental Status   • Hypotension     Follow-up end-stage renal  Subjective     Interval History:   The patient is much more alert and awake h, no chest pain or shortness of air, no nausea or vomiting, seen and examined while on dialysis      Review of Systems:   As noted above    Objective     Vital Signs  Temp:  [97.7 °F (36.5 °C)-98.9 °F (37.2 °C)] 98.3 °F (36.8 °C)  Heart Rate:  [] 93  Resp:  [14-18] 16  BP: ()/(44-71) 116/44    Flowsheet Rows      First Filed Value   Admission Height  170.2 cm (67\") Documented at 08/01/2020 1247   Admission Weight  71.7 kg (158 lb 1.6 oz) Documented at 08/01/2020 1210          No intake/output data recorded.  I/O last 3 completed shifts:  In: 720 [P.O.:720]  Out: -   No intake or output data in the 24 hours ending 08/08/20 1530    Physical Exam:  Pressure 105/55, heart rate 85/min, ultrafiltration goal 3100 cc and QB 400cc/min  General Appearance: Awake, alert and oriented x3, no acute distress, appears to be chronically ill, he has soft wrist restraint of the right wrist  Skin: warm and dry  HEENT: Pupils round reactive to light, nonicteric sclerae  Neck: No JVD, no carotid  Lungs: CTA, unlabored breathing effort  Heart: RRR, normal S1 and S2, no S3, no rub  Abdomen: soft, nontender,  present bowel sounds to auscultation  : no palpable bladder,  Extremities: no edema, cyanosis or clubbing, functional AV fistula in the right upper  Neuro: Normal speech and mental status, moving all extremities        Results Review:    Results from last 7 days   Lab Units 08/08/20  0427 08/06/20  0604 08/05/20  0510  08/02/20  0941   SODIUM mmol/L 140 133* 135*   < > 130*   POTASSIUM mmol/L 4.9 4.4 4.2   < > 3.7   CHLORIDE mmol/L 102 99 100   < > 93*   CO2 mmol/L 24.6 21.9* 22.4   < > 23.8   BUN mg/dL 58* 56* 40*   " < > 29*   CREATININE mg/dL 6.99* 8.29* 7.19*   < > 5.62*   CALCIUM mg/dL 8.0* 7.9* 7.9*   < > 8.1*   BILIRUBIN mg/dL  --   --   --   --  0.7   ALK PHOS U/L  --   --   --   --  59   ALT (SGPT) U/L  --   --   --   --  14   AST (SGOT) U/L  --   --   --   --  19   GLUCOSE mg/dL 190* 142* 157*   < > 148*    < > = values in this interval not displayed.       Estimated Creatinine Clearance: 10 mL/min (A) (by C-G formula based on SCr of 6.99 mg/dL (H)).    Results from last 7 days   Lab Units 08/08/20 0427 08/06/20  0604 08/05/20  0510  08/03/20  0940   MAGNESIUM mg/dL 2.1  --   --   --  2.0   PHOSPHORUS mg/dL 4.8* 6.8* 6.0*   < > 6.8*    < > = values in this interval not displayed.             Results from last 7 days   Lab Units 08/08/20 0427 08/06/20  0604 08/05/20  0510 08/04/20  0544 08/03/20  0940   WBC 10*3/mm3 6.04 8.30 6.22 6.00 6.79   HEMOGLOBIN g/dL 8.5* 8.9* 8.9* 9.1* 9.3*   PLATELETS 10*3/mm3 97* 78* 89* 93* 89*               Imaging Results (Last 24 Hours)     ** No results found for the last 24 hours. **          aspirin 81 mg Oral Daily   insulin lispro 0-7 Units Subcutaneous TID AC   OLANZapine zydis 10 mg Oral BID   sodium chloride 10 mL Intravenous Q12H          Medication Review:   Current Facility-Administered Medications   Medication Dose Route Frequency Provider Last Rate Last Dose   • acetaminophen (TYLENOL) tablet 650 mg  650 mg Oral Q4H PRN Rainer Stevenson MD   650 mg at 08/05/20 0429    Or   • acetaminophen (TYLENOL) 160 MG/5ML solution 650 mg  650 mg Oral Q4H PRN Rainer Stevenson MD        Or   • acetaminophen (TYLENOL) suppository 650 mg  650 mg Rectal Q4H PRN Rainer Stevenson MD       • albumin human 25 % IV SOLN 12.5 g  12.5 g Intravenous PRN Zeus Paulson MD       • albumin human 25 % IV SOLN 25 g  25 g Intravenous 4x Daily PRN Connor Vazquez MD       • aspirin chewable tablet 81 mg  81 mg Oral Daily Janet Garrett MD   81 mg at 08/08/20 0950   • dextrose (D50W) 25 g/ 50mL  Intravenous Solution 25 g  25 g Intravenous Q15 Min PRN Vince Otto MD       • dextrose (GLUTOSE) oral gel 15 g  15 g Oral Q15 Min PRN Vince Otto MD       • glucagon (human recombinant) (GLUCAGEN DIAGNOSTIC) injection 1 mg  1 mg Subcutaneous Q15 Min PRN Vince Otto MD       • insulin lispro (humaLOG) injection 0-7 Units  0-7 Units Subcutaneous TID AC Vince Otto MD   3 Units at 08/08/20 0950   • nitroglycerin (NITROSTAT) ointment 0.5 inch  0.5 inch Topical Q6H PRN Janet Garrett MD       • nitroglycerin (NITROSTAT) SL tablet 0.4 mg  0.4 mg Sublingual Q5 Min PRN Rainer Stevenson MD   0.4 mg at 08/08/20 0447   • OLANZapine zydis (ZyPREXA) disintegrating tablet 10 mg  10 mg Oral BID Luis F Myers III, MD   10 mg at 08/08/20 1330   • ondansetron (ZOFRAN) injection 4 mg  4 mg Intravenous Q6H PRN Rainer Stevenson MD   4 mg at 08/05/20 0402   • sodium chloride 0.9 % bolus 1,000 mL  1,000 mL Intravenous PRN Zeus Paulson MD       • sodium chloride 0.9 % flush 10 mL  10 mL Intravenous PRN Rainer Stevenson MD       • sodium chloride 0.9 % flush 10 mL  10 mL Intravenous Q12H Rainer Stevenson MD   10 mL at 08/08/20 0950   • sodium chloride 0.9 % flush 10 mL  10 mL Intravenous PRN Rainer Stevenson MD           Assessment/Plan   1.  End-stage renal disease, on maintenance hemodialysis on dialysis every Tuesday, Thursday and Saturday, is seen and examined while on dialysis and he is tolerating the treatment very well, his electrolytes within acceptable range  2.  Altered mental state, significantly improved   3.  Anemia and thrombocytopenia, hemoglobin today is 8.5 and his platelet 97,000.  4.  Diabetes mellitus type 2  5.  Possible UTI, difficult to interpret with ESRD and his low urine output  6.  COPD    Plan:  1.  Continue the same treatment  2.  Surveillance labs          Zeus Paulson MD  08/08/20  15:30

## 2020-08-08 NOTE — NURSING NOTE
Sister/POA reports that patient told her yesterday he does not wish to return to nursing home, and would do his best to remain here as long as possible.

## 2020-08-08 NOTE — NURSING NOTE
Pt complaining of sudden onset chest pain, got stat EKG and troponin, called A. Vitals stable. Janet Mendez ordered stat potassium and magnesium.    EKG resulted showed ST elevation, troponin 5.62, nitro given x3 with no relief. Morphine 2mg given, no change in pain. Rapid response called. Cardiology consulted. Pt transferred pt to [address] [place] [address]. Janet Mckeon RN.

## 2020-08-09 NOTE — CONSULTS
[facility]  Cardiology Consult Note    Patient Identification:  Name: Lucien Avalos  Age: 75 y.o.  Sex: male  :  1945  MRN: [id number]             Requesting Physician: Dr. Otto  Reason for Consultation / Chief Complaint: management recommendations elevated troponin chest pain    History of Present Illness:   75-year-old male with retrosternal left-sided chest pains, patient has significant dementia and the quality of the information obtained is questionable, patient complaining of the constant retrosternal dull aching chest pains with shortness of breath    Comorbid cardiac risk factors:     Past Medical History:  Past Medical History:   Diagnosis Date   • Anxiety    • CHF (congestive heart failure) (CMS/[facility])    • Chronic pain    • COPD (chronic obstructive pulmonary disease) (CMS/[facility])    • Diabetes mellitus (CMS/[facility])    • GERD (gastroesophageal reflux disease)    • Hyperlipidemia    • Hypertension    • Hypotension    • Insomnia    • Mood disorder (CMS/[facility])    • Renal failure    • Respiratory failure (CMS/[facility])      Past Surgical History:  History reviewed. No pertinent surgical history.   Allergies:  No Known Allergies  Home Meds:  Medications Prior to Admission   Medication Sig Dispense Refill Last Dose   • albuterol sulfate  (90 Base) MCG/ACT inhaler Inhale 2 puffs Every 6 (Six) Hours As Needed for Wheezing.      • atorvastatin (LIPITOR) 40 MG tablet Take 40 mg by mouth Daily.      • clopidogrel (PLAVIX) 75 MG tablet Take 75 mg by mouth Daily.      • FLUoxetine (PROzac) 20 MG capsule Take 20 mg by mouth Daily.      • HYDROcodone-acetaminophen (NORCO) 5-325 MG per tablet Take 1 tablet by mouth Every 6 (Six) Hours As Needed.      • hydrOXYzine (ATARAX) 50 MG tablet Take 50 mg by mouth Every 12 (Twelve) Hours.      • insulin detemir (LEVEMIR) 100 UNIT/ML injection Inject 30 Units under the skin into the appropriate area as directed Daily.      • ipratropium-albuterol (COMBIVENT  RESPIMAT)  MCG/ACT inhaler Inhale 2 puffs Every 8 (Eight) Hours As Needed for Wheezing.      • losartan (COZAAR) 25 MG tablet Take 25 mg by mouth Daily.      • midodrine (PROAMATINE) 10 MG tablet Take 10 mg by mouth Daily. Give before dialysis, on dialysis day (Mon, Wed, Fri)      • Nutritional Supplements (PROMOD PO) Take 30 mL by mouth Daily.      • pantoprazole (PROTONIX) 40 MG EC tablet Take 40 mg by mouth 2 (two) times a day.      • sucralfate (CARAFATE) 1 g tablet Take 1 g by mouth 4 (Four) Times a Day.      • Sucroferric Oxyhydroxide (Velphoro) 500 MG chewable tablet Chew 1 tablet 3 (Three) Times a Day.      • temazepam (RESTORIL) 15 MG capsule Take 15 mg by mouth Every Night.        Current Meds:   [unfilled]  Social History:   Social History     Tobacco Use   • Smoking status: Former Smoker     Types: Cigarettes   • Smokeless tobacco: Never Used   Substance Use Topics   • Alcohol use: Never     Frequency: Never      Family History:  History reviewed. No pertinent family history.     Review of Systems    Constitutional: No weakness,fatigue, fever, rigors, chills   Eyes: No vision changes, eye pain   ENT/oropharynx: No difficulty swallowing, sore throat, epistaxis, changes in hearing   Cardiovascular:  Chest pain   Respiratory: No shortness of breath, dyspnea on exertion, cough, wheezing hemoptysis   Gastrointestinal: No abdominal pain, nausea, vomiting, diarrhea, bloody stools   Genitourinary: No hematuria, dysuria   Neurological: No headache, tremors, numbness,  one-sided weakness    Musculoskeletal: No cramps, myalgias,  joint pain, joint swelling   Integument: No rash, edema           Constitutional:  Temp:  [97.3 °F (36.3 °C)-98.6 °F (37 °C)] 98.6 °F (37 °C)  Heart Rate:  [84-96] 96  Resp:  [16-18] 18  BP: ()/(38-64) 100/64    Physical Exam   General:  Appears in no acute distress  Eyes: PERTL,  HEENT:  No JVD. Thyroid not visibly enlarged. No mucosal pallor or cyanosis  Respiratory:  Respirations regular and unlabored at rest. BBS with good air entry in all fields. No crackles, rubs or wheezes auscultated  Cardiovascular: S1S2 Regular rate and rhythm. No murmur, rub or gallop auscultated. No carotid bruits. DP/PT pulses    . No pretibial pitting edema  Gastrointestinal: Abdomen soft, flat, non tender. Bowel sounds present. No hepatosplenomegaly. No ascites  Musculoskeletal: MAHAJAN x4. No abnormal movements  Extremities: No digital clubbing or cyanosis  Skin: Color pink. Skin warm and dry to touch. No rashes  No xanthoma  Neuro: AAO x3 CN II-XII grossly intact              Cardiographics  ECG:     Telemetry:    Echocardiogram:     Imaging  Chest X-ray:     Lab Review   Results from last 7 days   Lab Units 08/08/20  0427   TROPONIN T ng/mL 5.620*     Results from last 7 days   Lab Units 08/08/20  0427   MAGNESIUM mg/dL 2.1     Results from last 7 days   Lab Units 08/09/20  0447   SODIUM mmol/L 136   POTASSIUM mmol/L 4.6   BUN mg/dL 28*   CREATININE mg/dL 3.97*   CALCIUM mg/dL 8.1*     @[id number]@  Results from last 7 days   Lab Units 08/09/20  0447 08/08/20  0427 08/06/20  0604   WBC 10*3/mm3 5.75 6.04 8.30   HEMOGLOBIN g/dL 7.9* 8.5* 8.9*   HEMATOCRIT % 24.7* 27.6* 26.9*   PLATELETS 10*3/mm3 99* 97* 78*             Assessment:  Non-Q wave myocardial infarction  Dementia    Recommendations / Plan:   Had a long discussion with the patient's POA, considering significant EKG changes as well as elevated troponin patient definitely has a non-Q MI but the patient is not a great candidate for cardiac intervention    Patient will be treated conservatively only          Sherman Kuhn MD  8/9/2020, 15:05      EMR Dragon/Transcription:   Dictated utilizing Dragon dictation

## 2020-08-09 NOTE — NURSING NOTE
Access center follow up:    Pt lying in bed watching tv upon approach.  He denies SI, states he was upset when he made those statements a few days ago.  Pt talked about drug use when he was younger and how a stint in [facility] long term influenced him to quit using.  He is anxious to find out what the next step is in his treatment.  Informed pt that per LAWANDA Olivares, cardiologist is coming to see pt today.  Access will continue to follow.

## 2020-08-09 NOTE — PLAN OF CARE
Patient up x1 walking to bathroom without a problem. Diet ordered. Room air. Frequently seeking staff. VSS. Will CTM.

## 2020-08-09 NOTE — PLAN OF CARE
Problem: Patient Care Overview  Goal: Plan of Care Review  Outcome: Ongoing (interventions implemented as appropriate)  Flowsheets (Taken 8/9/2020 6134)  Progress: no change  Plan of Care Reviewed With: patient  Outcome Summary: Pt alert with confusion. Called and talked to Alicia yi to give food last hs and put back to NPO post midnight. Pt c/o abd pain PRN tylenol given wirh relief noted. Pt able to sleep most of the hs. Will continue to monitor.     Problem: Patient Care Overview  Goal: Individualization and Mutuality  Outcome: Ongoing (interventions implemented as appropriate)     Problem: Fall Risk (Adult)  Goal: Identify Related Risk Factors and Signs and Symptoms  Outcome: Ongoing (interventions implemented as appropriate)     Problem: Fall Risk (Adult)  Goal: Absence of Fall  Outcome: Ongoing (interventions implemented as appropriate)     Problem: Skin Injury Risk (Adult)  Goal: Identify Related Risk Factors and Signs and Symptoms  Outcome: Ongoing (interventions implemented as appropriate)

## 2020-08-09 NOTE — NURSING NOTE
Care Everywhere shows encounter on 12/17/19 for a L Heart Cath and encounter on 12/19/19 for Cardiac Rehab related to a NSTEMI

## 2020-08-09 NOTE — NURSING NOTE
Per sister Hyacinth (PoA), Patient had a small heart attack on 12/17/19 and was taken to [facility], and had a procedure done by Dr. Ventura Michelle [phone]

## 2020-08-09 NOTE — PROGRESS NOTES
[facility]               ASSOCIATES     LOS: 8 days     Name: Lucien Avalos  Age: 75 y.o.  Sex: male  :  1945  MRN: [id number]         Primary Care Physician: Matilde Hi MD    Diet Regular; Cardiac    Subjective     Patient seen in room.    Objective   Temp:  [97.3 °F (36.3 °C)-98.6 °F (37 °C)] 98.6 °F (37 °C)  Heart Rate:  [84-96] 96  Resp:  [16-18] 18  BP: ()/(38-64) 100/64  SpO2:  [92 %-100 %] 92 %  on  Flow (L/min):  [2] 2;   Device (Oxygen Therapy): room air  Body mass index is 25.66 kg/m².    Physical Exam   General: patient is awake and alert.  Head and ENT: normocephalic and atraumatic.  Lungs: symmetric expansion and equal air entry bilaterally.  Heart: regular rate, rhythm, no murmurs.  Abdomen: soft, nontender, bowel sounds present.  Extremities:  No clubbing, cyanosis or edema.  Neurologic:  No focal neurological deficits present.  Cranial nerves intact.  Psychiatry:  Normal mood and affect.  Skin:  Warm and no rash.    Reviewed medications and new clinical results        Results from last 7 days   Lab Units 20  04220  0604 20  0510 20  0544 20  0940   WBC 10*3/mm3 5.75 6.04 8.30 6.22 6.00 6.79   HEMOGLOBIN g/dL 7.9* 8.5* 8.9* 8.9* 9.1* 9.3*   PLATELETS 10*3/mm3 99* 97* 78* 89* 93* 89*     Results from last 7 days   Lab Units 20  0427 20  0604 20  0510 20  0544 20  0940   SODIUM mmol/L 136 140 133* 135* 134* 131*   POTASSIUM mmol/L 4.6 4.9 4.4 4.2 4.6 4.2   CHLORIDE mmol/L 100 102 99 100 97* 94*   CO2 mmol/L 29.3* 24.6 21.9* 22.4 25.1 24.1   BUN mg/dL 28* 58* 56* 40* 47* 38*   CREATININE mg/dL 3.97* 6.99* 8.29* 7.19* 7.66* 7.16*   CALCIUM mg/dL 8.1* 8.0* 7.9* 7.9* 8.4* 8.3*   GLUCOSE mg/dL 151* 190* 142* 157* 122* 149*     Lab Results   Component Value Date    ANIONGAP 6.7 2020     Glucose   Date/Time Value Ref Range Status   2020 1520 135 (H) 70  - 130 mg/dL Final   08/09/2020 1052 144 (H) 70 - 130 mg/dL Final   08/09/2020 0639 168 (H) 70 - 130 mg/dL Final   08/08/2020 2033 92 70 - 130 mg/dL Final   08/08/2020 1046 169 (H) 70 - 130 mg/dL Final   08/08/2020 0525 223 (H) 70 - 130 mg/dL Final   08/07/2020 2124 268 (H) 70 - 130 mg/dL Final   08/07/2020 1616 282 (H) 70 - 130 mg/dL Final     No results found for: HGBA1C  Estimated Creatinine Clearance: 17.4 mL/min (A) (by C-G formula based on SCr of 3.97 mg/dL (H)).    Lab Results   Component Value Date    COVID19 Not Detected 08/01/2020     Assessment/Plan   Active Hospital Problems    Diagnosis  POA   • **Unresponsive [R41.89]  Unknown   • Acute metabolic encephalopathy [G93.41]  Yes   • COPD (chronic obstructive pulmonary disease) (CMS/[facility]) [J44.9]  Yes   • Diabetes mellitus (CMS/[facility]) [E11.9]  Yes   • Hypertension [I10]  Unknown   • ESRD (end stage renal disease) (CMS/[facility]) [N18.6]  Yes   • UTI (urinary tract infection) [N39.0]  Unknown   • Elevated troponin [R79.89]  Unknown   • Anemia due to chronic kidney disease [N18.9, D63.1]  Unknown      Resolved Hospital Problems   No resolved problems to display.     75 y.o. male      Assessment and plan  1. Acute metabolic encephalopathy, continue to monitor for mental status changes.   2.  Increased agitation, monitor for safety.  3.  Urinary tract infection, he has received Rocephin, follow urine cultures.  4.  End-stage renal disease, continue dialysis per nephrology recommendations.  5.  COPD, chronic and stable.  6.  Hyponatremia, management per nephrology recommendations.  7.  Chest pain, status post Cardiology evaluation.  Conservative management.  Appreciate input.  8.  Further plans based on hospital course.    Vince Otto MD   08/09/20  18:25

## 2020-08-09 NOTE — PROGRESS NOTES
"   LOS: 8 days    Patient Care Team:  PersonMatilde MD as PCP - General (Internal Medicine)    Chief Complaint:    Chief Complaint   Patient presents with   • Altered Mental Status   • Hypotension     Follow-up end-stage renal  Subjective     Interval History:   The patient is much more alert and awake h, no chest pain or shortness of air, no nausea or vomiting, complaining of feeling somewhat weak.      Review of Systems:   As noted above    Objective     Vital Signs  Temp:  [97.3 °F (36.3 °C)-98.4 °F (36.9 °C)] 98 °F (36.7 °C)  Heart Rate:  [84-93] 92  Resp:  [16-18] 18  BP: ()/(38-56) 110/38    Flowsheet Rows      First Filed Value   Admission Height  170.2 cm (67\") Documented at 08/01/2020 1247   Admission Weight  71.7 kg (158 lb 1.6 oz) Documented at 08/01/2020 1210          No intake/output data recorded.  I/O last 3 completed shifts:  In: 240 [P.O.:240]  Out: -     Intake/Output Summary (Last 24 hours) at 8/9/2020 1049  Last data filed at 8/8/2020 2049  Gross per 24 hour   Intake 240 ml   Output --   Net 240 ml       Physical Exam:  General Appearance: Awake, alert and oriented x3, no acute distress, appears to be chronically ill, he has soft wrist restraint of the right wrist  Skin: warm and dry  HEENT: Pupils round reactive to light, nonicteric sclerae  Neck: No JVD, no carotid  Lungs: CTA, unlabored breathing effort  Heart: RRR, normal S1 and S2, no S3, no rub  Abdomen: soft, nontender,  present bowel sounds to auscultation  : no palpable bladder,  Extremities: no edema, cyanosis or clubbing, functional AV fistula in the right upper  Neuro: Normal speech and mental status, moving all extremities        Results Review:    Results from last 7 days   Lab Units 08/09/20  0447 08/08/20  0427 08/06/20  0604   SODIUM mmol/L 136 140 133*   POTASSIUM mmol/L 4.6 4.9 4.4   CHLORIDE mmol/L 100 102 99   CO2 mmol/L 29.3* 24.6 21.9*   BUN mg/dL 28* 58* 56*   CREATININE mg/dL 3.97* 6.99* 8.29*   CALCIUM " mg/dL 8.1* 8.0* 7.9*   GLUCOSE mg/dL 151* 190* 142*       Estimated Creatinine Clearance: 17.4 mL/min (A) (by C-G formula based on SCr of 3.97 mg/dL (H)).    Results from last 7 days   Lab Units 08/09/20 0447 08/08/20 0427 08/06/20  0604  08/03/20  0940   MAGNESIUM mg/dL  --  2.1  --   --  2.0   PHOSPHORUS mg/dL 4.6* 4.8* 6.8*   < > 6.8*    < > = values in this interval not displayed.             Results from last 7 days   Lab Units 08/09/20 0447 08/08/20 0427 08/06/20  0604 08/05/20  0510 08/04/20  0544   WBC 10*3/mm3 5.75 6.04 8.30 6.22 6.00   HEMOGLOBIN g/dL 7.9* 8.5* 8.9* 8.9* 9.1*   PLATELETS 10*3/mm3 99* 97* 78* 89* 93*               Imaging Results (Last 24 Hours)     ** No results found for the last 24 hours. **          aspirin 81 mg Oral Daily   insulin lispro 0-7 Units Subcutaneous TID AC   OLANZapine zydis 10 mg Oral BID   sodium chloride 10 mL Intravenous Q12H          Medication Review:   Current Facility-Administered Medications   Medication Dose Route Frequency Provider Last Rate Last Dose   • acetaminophen (TYLENOL) tablet 650 mg  650 mg Oral Q4H PRN Rainer Stevenson MD   650 mg at 08/08/20 2221    Or   • acetaminophen (TYLENOL) 160 MG/5ML solution 650 mg  650 mg Oral Q4H PRN Rainer Stevenson MD        Or   • acetaminophen (TYLENOL) suppository 650 mg  650 mg Rectal Q4H PRN Rainer Stevenson MD       • albumin human 25 % IV SOLN 25 g  25 g Intravenous 4x Daily PRN Connor Vazquez MD       • aspirin chewable tablet 81 mg  81 mg Oral Daily Janet Garrett MD   81 mg at 08/09/20 0822   • dextrose (D50W) 25 g/ 50mL Intravenous Solution 25 g  25 g Intravenous Q15 Min PRN Vince Otto MD       • dextrose (GLUTOSE) oral gel 15 g  15 g Oral Q15 Min PRN Vince Otto MD       • glucagon (human recombinant) (GLUCAGEN DIAGNOSTIC) injection 1 mg  1 mg Subcutaneous Q15 Min PRN iVnce Otto MD       • insulin lispro (humaLOG) injection 0-7 Units  0-7 Units Subcutaneous TID AC Vince Otto MD    3 Units at 08/08/20 0950   • nitroglycerin (NITROSTAT) ointment 0.5 inch  0.5 inch Topical Q6H PRN Janet Garrett MD       • nitroglycerin (NITROSTAT) SL tablet 0.4 mg  0.4 mg Sublingual Q5 Min PRN Rainer Stevenson MD   0.4 mg at 08/08/20 0447   • OLANZapine zydis (ZyPREXA) disintegrating tablet 10 mg  10 mg Oral BID Luis F Myers III, MD   10 mg at 08/09/20 0822   • ondansetron (ZOFRAN) injection 4 mg  4 mg Intravenous Q6H PRN Rainer Stevenson MD   4 mg at 08/09/20 0644   • sodium chloride 0.9 % flush 10 mL  10 mL Intravenous PRN Rainer Stevenson MD       • sodium chloride 0.9 % flush 10 mL  10 mL Intravenous Q12H Rainer Stevenson MD   10 mL at 08/09/20 0822   • sodium chloride 0.9 % flush 10 mL  10 mL Intravenous PRN Rainer Stevenson MD           Assessment/Plan   1.  End-stage renal disease, on maintenance hemodialysis on dialysis every Tuesday, Thursday and Saturday, he had dialysis yesterday without any difficulty  2.  Altered mental state, significantly improved   3.  Anemia and thrombocytopenia, hemoglobin today is 7.9 and platelet 99th  4.  Diabetes mellitus type 2  5.  Possible UTI, difficult to interpret with ESRD and his low urine output  6.  COPD    Plan:  1.  Continue the same treatment  2.  Surveillance labs          Zeus Paulson MD  08/09/20  10:49

## 2020-08-10 NOTE — NURSING NOTE
Patient been refusing to wear heart monitor,O2 sensor as well as labs to be drawn.. Called and talked to PATRICIA YBARRA and aware of it.

## 2020-08-10 NOTE — PROGRESS NOTES
[facility]               ASSOCIATES     LOS: 9 days     Name: Lucien Avalos  Age: 75 y.o.  Sex: male  :  1945  MRN: [id number]         Primary Care Physician: Matilde Hi MD    Diet Regular; Cardiac    Subjective    Patient seen at bedside, patient is lying in bed.    Objective   Temp:  [97.4 °F (36.3 °C)-97.6 °F (36.4 °C)] 97.5 °F (36.4 °C)  Heart Rate:  [80-81] 81  Resp:  [18] 18  BP: (100-111)/(51-64) 100/51     on  Flow (L/min):  [2] 2;   Device (Oxygen Therapy): nasal cannula  Body mass index is 25.65 kg/m².    Physical Exam  General: patient is awake and alert.  Head and ENT: normocephalic and atraumatic.  Lungs: symmetric expansion and equal air entry bilaterally.  Heart: regular rate, rhythm, no murmurs.  Abdomen: soft, nontender, bowel sounds present.  Extremities:  No clubbing, cyanosis or edema.  Neurologic:  No focal neurological deficits present.  Cranial nerves intact.  Psychiatry:  Normal mood and affect.  Skin:  Warm and no rash.    Reviewed medications and new clinical results        Results from last 7 days   Lab Units 08/10/20  1319 20  0447 20  0427 20  0604 20  0510 20  0544   WBC 10*3/mm3 5.11 5.75 6.04 8.30 6.22 6.00   HEMOGLOBIN g/dL 7.3* 7.9* 8.5* 8.9* 8.9* 9.1*   PLATELETS 10*3/mm3 106* 99* 97* 78* 89* 93*     Results from last 7 days   Lab Units 08/10/20  1319 20  0447 20  0427 20  0604 20  0510 20  0544   SODIUM mmol/L 138 136 140 133* 135* 134*   POTASSIUM mmol/L 4.7 4.6 4.9 4.4 4.2 4.6   CHLORIDE mmol/L 98 100 102 99 100 97*   CO2 mmol/L 27.1 29.3* 24.6 21.9* 22.4 25.1   BUN mg/dL 60* 28* 58* 56* 40* 47*   CREATININE mg/dL 6.04* 3.97* 6.99* 8.29* 7.19* 7.66*   CALCIUM mg/dL 8.0* 8.1* 8.0* 7.9* 7.9* 8.4*   GLUCOSE mg/dL 207* 151* 190* 142* 157* 122*     Lab Results   Component Value Date    ANIONGAP 12.9 08/10/2020     Glucose   Date/Time Value Ref Range Status   08/10/2020 0615  204 (H) 70 - 130 mg/dL Final   08/09/2020 2051 287 (H) 70 - 130 mg/dL Final   08/09/2020 1520 135 (H) 70 - 130 mg/dL Final   08/09/2020 1052 144 (H) 70 - 130 mg/dL Final   08/09/2020 0639 168 (H) 70 - 130 mg/dL Final   08/08/2020 2033 92 70 - 130 mg/dL Final   08/08/2020 1046 169 (H) 70 - 130 mg/dL Final   08/08/2020 0525 223 (H) 70 - 130 mg/dL Final     No results found for: HGBA1C  Estimated Creatinine Clearance: 11.4 mL/min (A) (by C-G formula based on SCr of 6.04 mg/dL (H)).    Lab Results   Component Value Date    COVID19 Not Detected 08/01/2020     Assessment/Plan   Active Hospital Problems    Diagnosis  POA   • **Unresponsive [R41.89]  Unknown   • Acute metabolic encephalopathy [G93.41]  Yes   • COPD (chronic obstructive pulmonary disease) (CMS/[facility]) [J44.9]  Yes   • Diabetes mellitus (CMS/[facility]) [E11.9]  Yes   • Hypertension [I10]  Unknown   • ESRD (end stage renal disease) (CMS/[facility]) [N18.6]  Yes   • UTI (urinary tract infection) [N39.0]  Unknown   • Elevated troponin [R79.89]  Unknown   • Anemia due to chronic kidney disease [N18.9, D63.1]  Unknown      Resolved Hospital Problems   No resolved problems to display.     75 y.o. male      Assessment and plan  1. Acute metabolic encephalopathy, continue to monitor for mental status changes.   2.  Increased agitation, monitor for safety.  Appears to be close to baseline mental status.  3.  Urinary tract infection, he has received Rocephin, follow urine cultures.  4.  End-stage renal disease, continue dialysis per nephrology recommendations.  5.  COPD, chronic and stable.  6.  Hyponatremia, management per nephrology recommendations.  7.  Chest pain, status post Cardiology evaluation.  Conservative management.  Appreciate input.  8.  Anemia and pancytopenia, patient's hem-occult stool sample is positive.  GI evaluation.  Hematology evaluation will also be requested.  9.  Further plans based on hospital course.    Vince Otto MD   08/10/20  16:17

## 2020-08-10 NOTE — PROGRESS NOTES
"Physicians Statement of Medical Necessity for  Ambulance Transportation    GENERAL INFORMATION     Name: Lucien Avalos  YOB: 1945  [id number] and [place] Medicaid [id number]  Transport Date:  8/10/2020   (Valid for round trips this date, or for scheduled repetitive trips for 60 days from the date signed below.)  Origin: [facility]  Destination: [facility]  Is the Patient's stay covered under Medicare Part A (PPS/DRG?)Yes   Closest appropriate facility? Yes  If this a hosp-hosp transfer? No  Is this a hospice patient? No    MEDICAL NECESSITY QUESTIONAIRE    Ambulance Transportation is medically necessary only if other means of transportation are contraindicated or would be potentially harmful to the patient.  To meet this requirement, the patient must be either \"bed confined\" or suffer from a condition such that transport by means other than an ambulance is contraindicated by the patient's condition.  The following questions must be answered by the healthcare professional signing below for this form to be valid:     1) Describe the MEDICAL CONDITION (physical and/or mental) of this patient AT THE TIME OF AMBULANCE TRANSPORT that requires the patient to be transported in an ambulance, and why transport by other means is contraindicated by the patient's condition: Unresponsiveness, Altered mental status, Acute Metabolic Encephalopathy, ESRD  Past Medical History:   Diagnosis Date   • Anxiety    • CHF (congestive heart failure) (CMS/HCC)    • Chronic pain    • COPD (chronic obstructive pulmonary disease) (CMS/HCC)    • Diabetes mellitus (CMS/HCC)    • GERD (gastroesophageal reflux disease)    • Hyperlipidemia    • Hypertension    • Hypotension    • Insomnia    • Mood disorder (CMS/HCC)    • Renal failure    • Respiratory failure (CMS/HCC)       History reviewed. No pertinent surgical history.   2) Is this patient \"bed confined\" as " "defined below?Yes    To be \"bed confined\" the patient must satisfy all three of the following criteria:  (1) unable to get up from bed without assistance; AND (2) unable to ambulate;  AND (3) unable to sit in a chair or wheelchair.  3) Can this patient safely be transported by car or wheelchair van (I.e., may safely sit during transport, without an attendant or monitoring?)No   4. In addition to completing questions 1-3 above, please check any of the following conditions that apply*:          *Note: supporting documentation for any boxes checked must be maintained in the patient's medical records Patient is confused, Medical attendant required and Other generalized weakness, falls risk, anxious      SIGNATURE OF PHYSICIAN OR OTHER AUTHORIZED HEALTHCARE PROFESSIONAL    I certify that the above information is true and correct based on my evaluation of this patient, and represent that the patient requires transport by ambulance and that other forms of transport are contraindicated.  I understand that this information will be used by the Centers for Medicare and Medicaid Services (CMS) to support the determiniation of medical necessity for ambulance services, and I represent that I have personal knowledge of the patient's condition at the time of transport.    x   If this box is checked, I also certify that the patient is physically or mentally incapable of signing the ambulance service's claim form and that the institution with which I am affiliated has furnished care, services or assistance to the patient.  My signature below is made on behalf of the patient pursuant to 42 .36(b)(4). In accordance with 42 .37, the specific reason(s) that the patient is physically or mentally incapable of signing the claim for is as follows: confused    Signature of Physician or Healthcare Professional  Date/Time:   8/10/2020 10:51      (For Scheduled repetitive transport, this form is not valid for transports performed " more than 60 days after this date).                                                                                                                                          Archana Lott RN    --------------------------------------------------------------------------------------------  Printed Name and Credentials of Physician or Authorized Healthcare Professional     *Form must be signed by patient's attending physician for scheduled, repetitive transports,.  For non-repetitive ambulance transports, if unable to obtain the signature of the attending physician, any of the following may sign (please select below):     Physician  Clinical Nurse Specialist  Registered Nurse     Physician Assistant  Discharge Planner  Licensed Practical Nurse     Nurse Practitioner

## 2020-08-10 NOTE — PROGRESS NOTES
"   LOS: 9 days    Patient Care Team:  PersonMatilde MD as PCP - General (Internal Medicine)    Chief Complaint:    Chief Complaint   Patient presents with   • Altered Mental Status   • Hypotension     Follow UP ESRD  Subjective     Interval History:   No complaints.  Denies cp or soa.  No n/v/d.  Objective     Vital Signs  Temp:  [97.4 °F (36.3 °C)-97.6 °F (36.4 °C)] 97.5 °F (36.4 °C)  Heart Rate:  [80-81] 81  Resp:  [18] 18  BP: (100-111)/(51-64) 100/51    Flowsheet Rows      First Filed Value   Admission Height  170.2 cm (67\") Documented at 08/01/2020 1247   Admission Weight  71.7 kg (158 lb 1.6 oz) Documented at 08/01/2020 1210          I/O this shift:  In: 200 [P.O.:200]  Out: -   I/O last 3 completed shifts:  In: 480 [P.O.:480]  Out: -     Intake/Output Summary (Last 24 hours) at 8/10/2020 1301  Last data filed at 8/10/2020 0741  Gross per 24 hour   Intake 440 ml   Output --   Net 440 ml       Physical Exam:  General Appearance: Awake, alert and oriented x3, no acute distress, appears to be chronically ill, he has soft wrist restraint of the right wrist  Skin: warm and dry  HEENT: Pupils round reactive to light, nonicteric sclerae  Neck: No JVD, no carotid  Lungs: CTA, unlabored breathing effort  Heart: RRR, normal S1 and S2, no S3, no rub  Abdomen: soft, nontender,  present bowel sounds to auscultation  : no palpable bladder,  Extremities: no edema, cyanosis or clubbing, functional AV fistula in the right upper  Neuro: Normal speech and mental status, moving all extremities      Results Review:    Results from last 7 days   Lab Units 08/09/20  0447 08/08/20  0427 08/06/20  0604   SODIUM mmol/L 136 140 133*   POTASSIUM mmol/L 4.6 4.9 4.4   CHLORIDE mmol/L 100 102 99   CO2 mmol/L 29.3* 24.6 21.9*   BUN mg/dL 28* 58* 56*   CREATININE mg/dL 3.97* 6.99* 8.29*   CALCIUM mg/dL 8.1* 8.0* 7.9*   GLUCOSE mg/dL 151* 190* 142*       Estimated Creatinine Clearance: 17.4 mL/min (A) (by C-G formula based on SCr of " 3.97 mg/dL (H)).    Results from last 7 days   Lab Units 08/09/20  0447 08/08/20  0427 08/06/20  0604   MAGNESIUM mg/dL  --  2.1  --    PHOSPHORUS mg/dL 4.6* 4.8* 6.8*             Results from last 7 days   Lab Units 08/09/20  0447 08/08/20  0427 08/06/20  0604 08/05/20  0510 08/04/20  0544   WBC 10*3/mm3 5.75 6.04 8.30 6.22 6.00   HEMOGLOBIN g/dL 7.9* 8.5* 8.9* 8.9* 9.1*   PLATELETS 10*3/mm3 99* 97* 78* 89* 93*               Imaging Results (Last 24 Hours)     ** No results found for the last 24 hours. **          aspirin 81 mg Oral Daily   insulin lispro 0-7 Units Subcutaneous TID AC   OLANZapine zydis 10 mg Oral BID   sodium chloride 10 mL Intravenous Q12H          Medication Review:   Current Facility-Administered Medications   Medication Dose Route Frequency Provider Last Rate Last Dose   • acetaminophen (TYLENOL) tablet 650 mg  650 mg Oral Q4H PRN Rainer Stevenson MD   650 mg at 08/10/20 0149    Or   • acetaminophen (TYLENOL) 160 MG/5ML solution 650 mg  650 mg Oral Q4H PRN Rainer Stevenson MD        Or   • acetaminophen (TYLENOL) suppository 650 mg  650 mg Rectal Q4H PRN Rainer Stevenson MD       • albumin human 25 % IV SOLN 25 g  25 g Intravenous 4x Daily PRN Connor Vazquez MD       • aspirin chewable tablet 81 mg  81 mg Oral Daily Janet Garrett MD   81 mg at 08/10/20 0846   • dextrose (D50W) 25 g/ 50mL Intravenous Solution 25 g  25 g Intravenous Q15 Min PRN Vince Otto MD       • dextrose (GLUTOSE) oral gel 15 g  15 g Oral Q15 Min PRN Vince Otto MD       • glucagon (human recombinant) (GLUCAGEN DIAGNOSTIC) injection 1 mg  1 mg Subcutaneous Q15 Min PRN Vince Otto MD       • insulin lispro (humaLOG) injection 0-7 Units  0-7 Units Subcutaneous TID AC Vince Otto MD   3 Units at 08/10/20 0846   • nitroglycerin (NITROSTAT) ointment 0.5 inch  0.5 inch Topical Q6H PRN Janet Garrett MD       • nitroglycerin (NITROSTAT) SL tablet 0.4 mg  0.4 mg Sublingual Q5 Min PRN Rainer Stevenson MD    0.4 mg at 08/08/20 0447   • OLANZapine zydis (ZyPREXA) disintegrating tablet 10 mg  10 mg Oral BID Luis F Myers III, MD   10 mg at 08/10/20 0846   • ondansetron (ZOFRAN) injection 4 mg  4 mg Intravenous Q6H PRN Rainer Stevenson MD   4 mg at 08/09/20 0644   • sodium chloride 0.9 % flush 10 mL  10 mL Intravenous PRN Rainer Stevenson MD       • sodium chloride 0.9 % flush 10 mL  10 mL Intravenous Q12H Rainer Stevenson MD   10 mL at 08/10/20 0846   • sodium chloride 0.9 % flush 10 mL  10 mL Intravenous PRN Rainer Stevenson MD           Assessment/Plan         Unresponsive    Acute metabolic encephalopathy    COPD (chronic obstructive pulmonary disease) (CMS/[facility])    Diabetes mellitus (CMS/[facility])    Hypertension    ESRD (end stage renal disease) (CMS/[facility])    UTI (urinary tract infection)    Elevated troponin    Anemia due to chronic kidney disease      1.  End-stage renal disease, on maintenance hemodialysis on dialysis every Tuesday, Thursday and Saturday  2.  Altered mental state, significantly improved   3.  Anemia and thrombocytopenia, hemoglobin today is 7.9 and platelet 99th  4.  Diabetes mellitus type 2  5.  Possible UTI, difficult to interpret with ESRD and his low urine output  6.  COPD     Plan:  1.  Continue the same treatment  2.  Surveillance labs    Janet Garcia MD  08/10/20  13:01

## 2020-08-10 NOTE — PROGRESS NOTES
"Adult Nutrition  Assessment/PES    Patient Name:  Lucien Avalos  YOB: 1945  MRN: [id number]  Admit Date:  8/1/2020    Assessment Date:  8/10/2020    Comments:  Nutrition screen completed for LOS. BMI 25.7 kg/m2.Diet Regular Cardiac with good po intake.    Reason for Assessment     Row Name 08/10/20 0740          Reason for Assessment    Reason For Assessment  other (see comments) LOS     Diagnosis  -- Acute metabolic encephalopathy, UTI, ESRD,DM,HTN,COPD           Anthropometrics     Row Name 08/10/20 0741          Anthropometrics    Height  170.2 cm (67.01\")     Weight  74.3 kg (163 lb 12.8 oz) not weighed by RD        Ideal Body Weight (IBW)    Ideal Body Weight (IBW) (kg)  68.12     % Ideal Body Weight  109.07        Body Mass Index (BMI)    BMI (kg/m2)  25.7     BMI Assessment  BMI 25-29.9: overweight         Labs/Tests/Procedures/Meds     Row Name 08/10/20 0742          Labs/Procedures/Meds    Lab Results Reviewed  reviewed     Lab Results Comments  glu, bun,cr, phos,alb        Diagnostic Tests/Procedures    Diagnostic Test/Procedure Reviewed  reviewed        Medications    Pertinent Medications Comments  insulin,         Physical Findings     Row Name 08/10/20 0743          Physical Findings    Skin  -- adeola 19         Estimated/Assessed Needs     Row Name 08/10/20 0741          Calculation Measurements    Height  170.2 cm (67.01\")         Nutrition Prescription Ordered     Row Name 08/10/20 0743          Nutrition Prescription PO    Current PO Diet  Regular     Common Modifiers  Cardiac         Evaluation of Received Nutrient/Fluid Intake     Row Name 08/10/20 0744          PO Evaluation    % PO Intake  po good               Problem/Interventions:  Problem 1     Row Name 08/10/20 0745          Nutrition Diagnoses Problem 1    Problem 1  Nutrition Appropriate for Condition at this Time     Etiology (related to)  Medical Diagnosis               Intervention Goal     Row Name 08/10/20 0746    "       Intervention Goal    General  Maintain nutrition     PO  Initiate feeding;Maintain intake     Weight  Maintain weight         Nutrition Intervention     Row Name 08/10/20 0746          Nutrition Intervention    RD/Tech Action  Follow Tx progress;Care plan reviewd           Education/Evaluation     Row Name 08/10/20 0746          Education    Education  Will Instruct as appropriate        Monitor/Evaluation    Monitor  Per protocol           Electronically signed by:  Shantell Botello RD  08/10/20 07:47

## 2020-08-10 NOTE — PLAN OF CARE
Problem: Patient Care Overview  Goal: Plan of Care Review  Outcome: Ongoing (interventions implemented as appropriate)  Flowsheets (Taken 8/10/2020 1713)  Progress: improving  Plan of Care Reviewed With: patient  Outcome Summary: VSS. No falls, no complaints. Pt remains on 2 L O2 NC. Pt remains frequently calling out for staff and frequently getting up from bed/chair. Pt agitated most of the day but resting now. Will CTM.

## 2020-08-10 NOTE — PLAN OF CARE
"  Problem: Patient Care Overview  Goal: Plan of Care Review  Outcome: Ongoing (interventions implemented as appropriate)  Flowsheets  Taken 8/10/2020 1342  Plan of Care Reviewed With: patient  Taken 8/10/2020 1330  Outcome Summary: 74 yo white male admitted 8/1/20 with unresponsiveness. PMH includes diabetes, hbp, end stage kidney disease. Pt is abrupt with staff and states that he does not have to do anything he does not want to do. Also requesting a tuna sandwich. Pt req CAG for sit/stand transfer. He amb 140' with r wx and CGA of 2. He req 2-3 standing rest breaks ot catch his breath. Pt then became impulsive and started walking very fast to return to room to use bathroom. While on commode, pt shouted out that he \"needed air\" - [facility] staff notified and they provided 2L O2 for a several minutes. Pt expecting to return to facility.  Patient was intermittently wearing a face mask during this therapy encounter. Therapist used appropriate personal protective equipment including eye protection, mask, and gloves.  Mask used was standard procedure mask. Appropriate PPE was worn during the entire therapy session. Hand hygiene was completed before and after therapy session. Patient is not in enhanced droplet precautions.       "

## 2020-08-10 NOTE — PLAN OF CARE
Problem: Patient Care Overview  Goal: Plan of Care Review  Outcome: Ongoing (interventions implemented as appropriate)  Flowsheets (Taken 8/10/2020 7660)  Progress: no change  Plan of Care Reviewed With: patient  Outcome Summary: Pt is alert with cvonfusion. Pt is very demanding always seeking attention of staff. Pt c/o generalized pain prn Tylenol given with relief noted. Stand by assist. Will continue to monitor.     Problem: Patient Care Overview  Goal: Individualization and Mutuality  Outcome: Ongoing (interventions implemented as appropriate)     Problem: Fall Risk (Adult)  Goal: Identify Related Risk Factors and Signs and Symptoms  Outcome: Ongoing (interventions implemented as appropriate)     Problem: Skin Injury Risk (Adult)  Goal: Identify Related Risk Factors and Signs and Symptoms  Outcome: Ongoing (interventions implemented as appropriate)

## 2020-08-10 NOTE — THERAPY EVALUATION
Patient Name: Lucien Avalos  : 1945    MRN: [id number]                              Today's Date: 8/10/2020       Admit Date: 2020    Visit Dx:     ICD-10-CM ICD-9-CM   1. Acute metabolic encephalopathy G93.41 348.31   2. ESRD (end stage renal disease) on dialysis (CMS/Formerly [facility]) N18.6 585.6    Z99.2 V45.11   3. Elevated troponin R79.89 790.6   4. Acute UTI N39.0 599.0     Patient Active Problem List   Diagnosis   • Acute metabolic encephalopathy   • Unresponsive   • COPD (chronic obstructive pulmonary disease) (CMS/[facility])   • Diabetes mellitus (CMS/[facility])   • Hypertension   • ESRD (end stage renal disease) (CMS/[facility])   • UTI (urinary tract infection)   • Elevated troponin   • Anemia due to chronic kidney disease     Past Medical History:   Diagnosis Date   • Anxiety    • CHF (congestive heart failure) (CMS/[facility])    • Chronic pain    • COPD (chronic obstructive pulmonary disease) (CMS/[facility])    • Diabetes mellitus (CMS/[facility])    • GERD (gastroesophageal reflux disease)    • Hyperlipidemia    • Hypertension    • Hypotension    • Insomnia    • Mood disorder (CMS/[facility])    • Renal failure    • Respiratory failure (CMS/[facility])      History reviewed. No pertinent surgical history.  General Information     Row Name 08/10/20 1328          PT Evaluation Time/Intention    Document Type  discharge evaluation/summary  -DJ     Mode of Treatment  individual therapy;physical therapy  -DJ     Row Name 08/10/20 1328          General Information    Patient Profile Reviewed?  yes  -DJ     Prior Level of Function  independent:;ADL's;all household mobility  -DJ     Existing Precautions/Restrictions  fall  -DJ     Barriers to Rehab  medically complex;cognitive status;previous functional deficit  -DJ     Row Name 08/10/20 1328          Relationship/Environment    Lives With  facility resident  -DJ     Row Name 08/10/20 1328          Resource/Environmental Concerns    Current Living Arrangements  extended care facility  -DJ     Row Name 08/10/20  1328          Cognitive Assessment/Intervention- PT/OT    Orientation Status (Cognition)  unable/difficult to assess  -DJ     Cognitive Assessment/Intervention Comment  Somewhat agitated  -DJ     Row Name 08/10/20 1328          Safety Issues, Functional Mobility    Safety Issues Affecting Function (Mobility)  at risk behavior observed;judgment  -DJ     Impairments Affecting Function (Mobility)  balance;endurance/activity tolerance  -DJ     Comment, Safety Issues/Impairments (Mobility)  gt belt, grippy socks  -DJ       User Key  (r) = Recorded By, (t) = Taken By, (c) = Cosigned By    Initials Name Provider Type    Kelli Buchanan, PT Physical Therapist        Mobility     Row Name 08/10/20 1330          Bed Mobility Assessment/Treatment    Comment (Bed Mobility)  Pt sitting EOB upon PT arrival  -DJ     Row Name 08/10/20 1330          Transfer Assessment/Treatment    Comment (Transfers)  sit/stand from EOB  -DJ     Row Name 08/10/20 1330          Bed-Chair Transfer    Bed-Chair [place] (Transfers)  not tested  -DJ     Row Name 08/10/20 1330          Sit-Stand Transfer    Sit-Stand [place] (Transfers)  contact guard;verbal cues  -DJ     Assistive Device (Sit-Stand Transfers)  walker, front-wheeled  -DJ     Row Name 08/10/20 1330          Gait/Stairs Assessment/Training    Gait/Stairs Assessment/Training  gait/ambulation assistive device;distance ambulated;gait pattern;gait deviations  -DJ     [place] Level (Gait)  contact guard;verbal cues;1 person assist  -DJ     Assistive Device (Gait)  walker, front-wheeled  -DJ     Distance in Feet (Gait)  140' with 2-3 standing rest breaks  -DJ     Pattern (Gait)  step-through  -DJ     Deviations/Abnormal Patterns (Gait)  festinating/shuffling;gait speed decreased;stride length decreased  -DJ     [place] Level (Stairs)  not tested  -DJ     Comment (Gait/Stairs)  Pt amb 140' with r wx and CGA of 2 and vc to slow down. Pt very impulsive and spontaneously starts  to walk very fast. Pt req 2-3 standing rest breaks due to SOB and was in a hurry to return to room to use bathroom. While pt on commode, he shouted that he needed some air - nsg provided 2LO2 for several minutes.                -DJ       User Key  (r) = Recorded By, (t) = Taken By, (c) = Cosigned By    Initials Name Provider Type    Kelli Buchanan, PT Physical Therapist        Obj/Interventions     Row Name 08/10/20 8496          General ROM    GENERAL ROM COMMENTS  Grossly WFL  -DJ     Row Name 08/10/20 1330          MMT (Manual Muscle Testing)    General MMT Comments  Moves all 4s; unable to formerly assess   -DJ     Row Name 08/10/20 5090          Static Sitting Balance    Level of [place] (Unsupported Sitting, Static Balance)  conditional independence  -DJ     Sitting Position (Unsupported Sitting, Static Balance)  sitting on edge of bed  -DJ     Time Able to Maintain Position (Unsupported Sitting, Static Balance)  more than 5 minutes  -DJ     Row Name 08/10/20 0899          Static Standing Balance    Level of [place] (Supported Standing, Static Balance)  contact guard assist  -DJ     Time Able to Maintain Position (Supported Standing, Static Balance)  45 to 60 seconds  -DJ     Assistive Device Utilized (Supported Standing, Static Balance)  walker, rolling  -DJ     Row Name 08/10/20 4398          Sensory Assessment/Intervention    Sensory General Assessment  no sensation deficits identified  -DJ       User Key  (r) = Recorded By, (t) = Taken By, (c) = Cosigned By    Initials Name Provider Type    Kelli Buchanan, PT Physical Therapist        Goals/Plan    No documentation.       Clinical Impression     Row Name 08/10/20 0798          Pain Assessment    Additional Documentation  Pain Scale: Numbers Pre/Post-Treatment (Group)  -[facility] Name 08/10/20 4340          Pain Scale: Numbers Pre/Post-Treatment    Pre/Post Treatment Pain Comment  no verbal c/o pain; requesting a tuna sandwich  -     Row Name  "08/10/20 1337          Plan of Care Review    Plan of Care Reviewed With  patient  -DJ     Outcome Summary  76 yo white male admitted 8/1/20 with unresponsiveness. PMH includes diabetes, hbp, end staage kidney disease. Pt is abrupt with staff and states that he does not have to do anything he does not want to do. Also requesting a tuna sandwich. Pt req CAG for sit/stand transfer. He amb 140' with r wx and CGA of 2. He req 2-3 standing rest breaks ot catch his breath. Pt then became impulsive and started walking veyr fast to return to room to use bathroom. Whikle on commode, pt shouted out that he \"needed air\" - nsg staff notified and they provided 2L O2 for a several minutes. Pt expecting to return to facility  -DJ     Row Name 08/10/20 3510          Physical Therapy Clinical Impression    Patient/Family Goals Statement (PT Clinical Impression)  Return to facility  -DJ     Criteria for Skilled Interventions Met (PT Clinical Impression)  yes;treatment indicated  -DJ     Rehab Potential (PT Clinical Summary)  fair, will monitor progress closely  -DJ     Row Name 08/10/20 1335          Vital Signs    O2 Delivery Pre Treatment  room air  -DJ     O2 Delivery Intra Treatment  room air  -DJ     Post SpO2 (%)  -- 2L while on commode  -DJ     O2 Delivery Post Treatment  supplemental O2  -DJ     Pre Patient Position  Supine  -DJ     Intra Patient Position  Standing  -DJ     Post Patient Position  Sitting  -DJ     Row Name 08/10/20 7412          Positioning and Restraints    Pre-Treatment Position  in bed  -DJ     Post Treatment Position  bathroom  -DJ     Bathroom  notified nsg;call light within reach;encouraged to call for assist;with nsg  -DJ       User Key  (r) = Recorded By, (t) = Taken By, (c) = Cosigned By    Initials Name Provider Type    Kelli Buchanan, PT Physical Therapist        Outcome Measures     Row Name 08/10/20 1349          How much help from another person do you currently need...    Turning from your back " to your side while in flat bed without using bedrails?  4  -DJ     Moving from lying on back to sitting on the side of a flat bed without bedrails?  4  -DJ     Moving to and from a bed to a chair (including a wheelchair)?  4  -DJ     Standing up from a chair using your arms (e.g., wheelchair, bedside chair)?  4  -DJ     Climbing 3-5 steps with a railing?  3  -DJ     To walk in hospital room?  3  -DJ     AM-PAC 6 Clicks Score (PT)  22  -DJ     Row Name 08/10/20 1341          Functional Assessment    Outcome Measure Options  AM-PAC 6 Clicks Basic Mobility (PT)  -DJ       User Key  (r) = Recorded By, (t) = Taken By, (c) = Cosigned By    Initials Name Provider Type    Kelli Buchanan, PT Physical Therapist        Physical Therapy Education                 Title: PT OT SLP Therapies (In Progress)     Topic: Physical Therapy (In Progress)     Point: Mobility training (In Progress)     Description:   Instruct learner(s) on safety and technique for assisting patient out of bed, chair or wheelchair.  Instruct in the proper use of assistive devices, such as walker, crutches, cane or brace.              Patient Friendly Description:   It's important to get you on your feet again, but we need to do so in a way that is safe for you. Falling has serious consequences, and your personal safety is the most important thing of all.        When it's time to get out of bed, one of us or a family member will sit next to you on the bed to give you support.     If your doctor or nurse tells you to use a walker, crutches, a cane, or a brace, be sure you use it every time you get out of bed, even if you think you don't need it.    Learning Progress Summary           Patient Nonacceptance, E, NR by ROSEY at 8/10/2020 1341                   Point: Body mechanics (In Progress)     Description:   Instruct learner(s) on proper positioning and spine alignment for patient and/or caregiver during mobility tasks and/or exercises.              Learning  "Progress Summary           Patient Nonacceptance, E, NR by ROSEY at 8/10/2020 1341                   Point: Precautions (In Progress)     Description:   Instruct learner(s) on prescribed precautions during mobility and gait tasks              Learning Progress Summary           Patient Nonacceptance, E, NR by ROSEY at 8/10/2020 1341                               User Key     Initials Effective Dates Name Provider Type Discipline     10/25/19 -  Kelli Crowell PT Physical Therapist PT              PT Recommendation and Plan     Outcome Summary/Treatment Plan (PT)  Anticipated Discharge Disposition (PT): extended care facility  Plan of Care Reviewed With: patient  Outcome Summary: 74 yo white male admitted 8/1/20 with unresponsiveness. PMH includes diabetes, hbp, end staage kidney disease. Pt is abrupt with staff and states that he does not have to do anything he does not want to do. Also requesting a tuna sandwich. Pt req CAG for sit/stand transfer. He amb 140' with r wx and CGA of 2. He req 2-3 standing rest breaks ot catch his breath. Pt then became impulsive and started walking veyr fast to return to room to use bathroom. Whikle on commode, pt shouted out that he \"needed air\" - ns staff notified and they provided 2L O2 for a several minutes. Pt expecting to return to facility     Time Calculation:   PT Charges     Row Name 08/10/20 1343             Time Calculation    Start Time  1142  -DJ      Stop Time  1204  -DJ      Time Calculation (min)  22 min  -DJ      PT Non-Billable Time (min)  15 min  -DJ      PT Received On  08/10/20  -DJ        User Key  (r) = Recorded By, (t) = Taken By, (c) = Cosigned By    Initials Name Provider Type    Kelli Buchanan PT Physical Therapist        Therapy Charges for Today     Code Description Service Date Service Provider Modifiers Qty    26932716342 HC PT EVAL MOD COMPLEXITY 2 8/10/2020 Kelli Crowell, PT GP 1    68026574272 HC PT THER PROC EA 15 MIN 8/10/2020 Kelli Crowell, PT GP 1    " [id number]  PT THER SUPP EA 15 MIN 8/10/2020 Kelli Crowell, PT GP 1          PT G-Codes  Outcome Measure Options: AM-PAC 6 Clicks Basic Mobility (PT)  AM-PAC 6 Clicks Score (PT): 22    Kelli Crowell, PT  8/10/2020

## 2020-08-10 NOTE — PROGRESS NOTES
Continued Stay Note  [facility]     Patient Name: Lucien Avalos  MRN: [id number]  Today's Date: 8/10/2020    Admit Date: 8/1/2020    Discharge Plan     Row Name 08/10/20 1030       Plan    Plan  [facility] After PT sees, EMS scheduled for 2pm    Patient/Family in Agreement with Plan  yes    Plan Comments  Spoke with Eugenie Fuentes they can accept pt back as long as pt is restraint and sitter free for greater than 24 hours. SPoke with LAWANDA Castillo, pt has been restraint free all weekend. Per Nallely/Brandee pt is from  level of care with medicaid bed hold, Pre-cert will be initiated after pt works with PT but he does not need to wait here for pre-cert to return. She states he does not need another covid19 test. CCP called ROHINI Ewing via outbound call, introduced self and explained CCP role. IMM notice given and she request copy to be emailed to her at [email or web address]. Discussed transportation and she requests EMS transport. CCP explained hospital unable to guarantee insurance payment, but medical necessity form completed. CCP arranged EMS pickup for 2pm. Updated Luz Marina/Hosptialist coordinator for MD Regarding dc today. Packet with LAWANDA. JUS WEBBER/DOUG        Discharge Codes    No documentation.       Expected Discharge Date and Time     Expected Discharge Date Expected Discharge Time    Aug 7, 2020             Archana Lott RN

## 2020-08-10 NOTE — PROGRESS NOTES
"[facility]  Cardiology Progress Note    Patient Identification:  Name: Lucien Avalos  Age: 75 y.o.  Sex: male  :  1945  MRN: [id number]                 Follow Up / Chief Complaint: management recommendations of elevated troponin chest pain    Interval History:non-Q wave MI, will treat conservatively       Subjective: Denies chest pain and shortness of breath      Objective:    Past Medical History:  Past Medical History:   Diagnosis Date   • Anxiety    • CHF (congestive heart failure) (CMS/[facility])    • Chronic pain    • COPD (chronic obstructive pulmonary disease) (CMS/[facility])    • Diabetes mellitus (CMS/HCC)    • GERD (gastroesophageal reflux disease)    • Hyperlipidemia    • Hypertension    • Hypotension    • Insomnia    • Mood disorder (CMS/HCC)    • Renal failure    • Respiratory failure (CMS/HCC)      Past Surgical History:  History reviewed. No pertinent surgical history.     Social History:   Social History     Tobacco Use   • Smoking status: Former Smoker     Types: Cigarettes   • Smokeless tobacco: Never Used   Substance Use Topics   • Alcohol use: Never     Frequency: Never      Family History:  History reviewed. No pertinent family history.       Allergies:  No Known Allergies  Scheduled Meds:    aspirin 81 mg Daily   insulin lispro 0-7 Units TID AC   OLANZapine zydis 10 mg BID   sodium chloride 10 mL Q12H           INTAKE AND OUTPUT:    Intake/Output Summary (Last 24 hours) at 8/10/2020 1551  Last data filed at 8/10/2020 0741  Gross per 24 hour   Intake 440 ml   Output --   Net 440 ml       ROS  Constitutional: Awake and alert, no fever. No nosebleeds  Abdomen           no abdominal pain   Cardiac              no chest pain  Pulmonary          no shortness of breath      /62 (BP Location: Left arm, Patient Position: Lying)   Pulse 97   Temp 98 °F (36.7 °C) (Oral)   Resp 18   Ht 170.2 cm (67.01\")   Wt 74.3 kg (163 lb 12.8 oz) Comment: not weighed by RD  SpO2 92%   BMI " 25.65 kg/m²   General appearance: No acute changes   Neck: Trachea midline; NECK, supple, no thyromegaly or lymphadenopathy   Lungs: Normal size and shape, normal breath sounds, equal distribution of air, no rales and rhonchi   CV: S1-S2 regular, no murmurs, no rub, no gallop   Abdomen: Soft, non-tender; no masses , no abnormal abdominal sounds   Extremities: No deformity , normal color , no peripheral edema   Skin: Normal temperature, turgor and texture; no rash, ulcers            Cardiographics  Telemetry:     ECG:           Echocardiogram:   Summary   The ejection fraction biplane was calculated at 42 %.   Hypokinetic motion of the apical inferior wall noted in the left ventricle.   Moderate concentric left ventricular hypertrophy observed.   The left atrium is mildly dilated.   There is mild right atrial enlargement.   Moderate aortic stenosis is present.   Mild mitral regurgitation is present.   Mild tricuspid regurgitation.  Lab Review   Results from last 7 days   Lab Units 08/08/20  0427   TROPONIN T ng/mL 5.620*     Results from last 7 days   Lab Units 08/08/20  0427   MAGNESIUM mg/dL 2.1     Results from last 7 days   Lab Units 08/10/20  1319   SODIUM mmol/L 138   POTASSIUM mmol/L 4.7   BUN mg/dL 60*   CREATININE mg/dL 6.04*   CALCIUM mg/dL 8.0*        Results from last 7 days   Lab Units 08/10/20  1319 08/09/20  0447 08/08/20  0427   WBC 10*3/mm3 5.11 5.75 6.04   HEMOGLOBIN g/dL 7.3* 7.9* 8.5*   HEMATOCRIT % 23.3* 24.7* 27.6*   PLATELETS 10*3/mm3 106* 99* 97*        The following medical decision was discussed in detail with Dr. Kuhn    Assessment:  1. Non-Q wave MI  2. Dementia      Plan:  Blood pressure low normal. He has had a non-Qwave Mi and he will be treated conservatively.  Continue aspirin.  At this time his blood pressure is too low to add beta-blocker.       )8/10/2020  AMADA Stone    Patient personally interviewed and above subjective findings personally confirmed during a  "face to face contact with patient today  All findings of physical examination confirmed  All pertinent and performed labs, cardiac procedures ,  radiographs of the last 24 hours personally reviewed  Impression and plans discussed/elaborated and implemented jointly as described above     Sherman Kuhn MD            EMR Dragon/Transcription:   \"Dictated utilizing Dragon dictation\".     "

## 2020-08-10 NOTE — CONSULTS
Gastroenterology   Initial Inpatient Consult Note    Referring Provider: Vince Otto MD    Reason for Consultation: Anemia    Subjective     History of present illness:    75 y.o. male with multiple medical problems who we are asked to see for anemia.  Patient is quite agitated on exam today.  He states that he is currently in longterm cell and wants out.  I asked him about any rectal bleeding and he denies it.  He denies any abdominal pain or any GI symptoms.  He is yelling out to the nurses to bring him toilet paper for no apparent reason.  He denies any vomiting or hematemesis.  His history is otherwise unreliable due to mental status.    Past Medical History:  Past Medical History:   Diagnosis Date   • Anxiety    • CHF (congestive heart failure) (CMS/[facility])    • Chronic pain    • COPD (chronic obstructive pulmonary disease) (CMS/HCC)    • Diabetes mellitus (CMS/HCC)    • GERD (gastroesophageal reflux disease)    • Hyperlipidemia    • Hypertension    • Hypotension    • Insomnia    • Mood disorder (CMS/HCC)    • Renal failure    • Respiratory failure (CMS/[facility])      Past Surgical History:  History reviewed. No pertinent surgical history.   Social History:   Social History     Tobacco Use   • Smoking status: Former Smoker     Types: Cigarettes   • Smokeless tobacco: Never Used   Substance Use Topics   • Alcohol use: Never     Frequency: Never      Family History:  History reviewed. No pertinent family history.    Home Meds:  Medications Prior to Admission   Medication Sig Dispense Refill Last Dose   • albuterol sulfate  (90 Base) MCG/ACT inhaler Inhale 2 puffs Every 6 (Six) Hours As Needed for Wheezing.      • atorvastatin (LIPITOR) 40 MG tablet Take 40 mg by mouth Daily.      • clopidogrel (PLAVIX) 75 MG tablet Take 75 mg by mouth Daily.      • FLUoxetine (PROzac) 20 MG capsule Take 20 mg by mouth Daily.      • HYDROcodone-acetaminophen (NORCO) 5-325 MG per tablet Take 1 tablet by mouth Every 6 (Six) Hours As  Needed.      • hydrOXYzine (ATARAX) 50 MG tablet Take 50 mg by mouth Every 12 (Twelve) Hours.      • insulin detemir (LEVEMIR) 100 UNIT/ML injection Inject 30 Units under the skin into the appropriate area as directed Daily.      • ipratropium-albuterol (COMBIVENT RESPIMAT)  MCG/ACT inhaler Inhale 2 puffs Every 8 (Eight) Hours As Needed for Wheezing.      • losartan (COZAAR) 25 MG tablet Take 25 mg by mouth Daily.      • midodrine (PROAMATINE) 10 MG tablet Take 10 mg by mouth Daily. Give before dialysis, on dialysis day (Mon, Wed, Fri)      • Nutritional Supplements (PROMOD PO) Take 30 mL by mouth Daily.      • pantoprazole (PROTONIX) 40 MG EC tablet Take 40 mg by mouth 2 (two) times a day.      • sucralfate (CARAFATE) 1 g tablet Take 1 g by mouth 4 (Four) Times a Day.      • Sucroferric Oxyhydroxide (Velphoro) 500 MG chewable tablet Chew 1 tablet 3 (Three) Times a Day.      • temazepam (RESTORIL) 15 MG capsule Take 15 mg by mouth Every Night.        Current Meds:     aspirin 81 mg Oral Daily   insulin lispro 0-7 Units Subcutaneous TID AC   OLANZapine zydis 10 mg Oral BID   sodium chloride 10 mL Intravenous Q12H     Allergies:  No Known Allergies  Review of Systems  Pertinent items are noted in HPI, all other systems reviewed and negative    Objective     Vital Signs  Temp:  [97.4 °F (36.3 °C)-97.6 °F (36.4 °C)] 97.5 °F (36.4 °C)  Heart Rate:  [80-81] 81  Resp:  [18] 18  BP: (100-111)/(51-64) 100/51    Physical Exam:  CONSTITUTIONAL:  today's vital signs reviewed  EARS NOSE THROAT: trachea midline and no deformity of the nares  EYES: no scleral icterus  GASTROINTESTINAL: abdomen is soft, nontender, nondistended with normal active bowel sounds, no masses are appreciated  PSYCHIATRIC: appropriate mood and affect  RESPIRATORY: normal inspiratory effort with no increased work of breathing  NEUROLOGIC: patient is awake and alert  DERMATOLOGIC: skin is warm with no cyanosis  LYMPHATIC: no periumbilical  lymphadenopathy     Results Review:   I reviewed the patient's new clinical results.    Results from last 7 days   Lab Units 08/10/20  1319 08/09/20  0447 08/08/20  0427   WBC 10*3/mm3 5.11 5.75 6.04   HEMOGLOBIN g/dL 7.3* 7.9* 8.5*   HEMATOCRIT % 23.3* 24.7* 27.6*   PLATELETS 10*3/mm3 106* 99* 97*     Results from last 7 days   Lab Units 08/09/20  0447 08/08/20  0427 08/06/20  0604   SODIUM mmol/L 136 140 133*   POTASSIUM mmol/L 4.6 4.9 4.4   CHLORIDE mmol/L 100 102 99   CO2 mmol/L 29.3* 24.6 21.9*   BUN mg/dL 28* 58* 56*   CREATININE mg/dL 3.97* 6.99* 8.29*   CALCIUM mg/dL 8.1* 8.0* 7.9*   GLUCOSE mg/dL 151* 190* 142*         Lab Results   Lab Value Date/Time    LIPASE 23 04/03/2020 2355    LIPASE 85 12/19/2019 0925    LIPASE 140 09/01/2019 1645    LIPASE 84 05/09/2019 1317    LIPASE 147 02/25/2019 0558       Radiology:  MRI Brain Without Contrast   Final Result       No acute infarct. No findings to suggest hemorrhage at the midbrain,   although the exam is somewhat limited by motion artifact; the CT density   of concern on the earlier CT may represent vascular anomaly. Follow-up   evaluation with enhanced MRI may be helpful when/if possible, and close   follow-up CT could be obtained to ensure stability of the CT finding of   concern.       This report was finalized on 8/1/2020 3:43 PM by Dr. Jayjay Corbett M.D.          XR Chest 1 View   Final Result   Small likely atelectasis or scarring. Follow-up as   clinically indicated.       This report was finalized on 8/1/2020 1:34 PM by Dr. Jayjay Corbett M.D.          CT Cerebral Perfusion With & Without Contrast   Final Result           1. No perfusion abnormality to suggest completed or threatened acute   infarct.   2. No arterial cutoff. Narrowing at the bilateral ICA cavernous   segments, estimated at 65% on the right, 75% on the left.   3. A 3 to 4 mm hyperdense focus at the mid brain remains indeterminate,   could be a small focus of hemorrhage or  vascular abnormality. Close   follow-up/further evaluation recommended.   4. Possible mucosal lesion in the trachea versus lobulated mucus.           The findings were discussed by telephone with Dr. Sanchez at 1235,   1248, 08/01/2020       This report was finalized on 8/1/2020 12:56 PM by Dr. Jayjay Corbett M.D.          CT Angiogram Neck   Final Result           1. No perfusion abnormality to suggest completed or threatened acute   infarct.   2. No arterial cutoff. Narrowing at the bilateral ICA cavernous   segments, estimated at 65% on the right, 75% on the left.   3. A 3 to 4 mm hyperdense focus at the mid brain remains indeterminate,   could be a small focus of hemorrhage or vascular abnormality. Close   follow-up/further evaluation recommended.   4. Possible mucosal lesion in the trachea versus lobulated mucus.           The findings were discussed by telephone with Dr. Sanchez at 1235,   1248, 08/01/2020       This report was finalized on 8/1/2020 12:56 PM by Dr. Jayjay Corbett M.D.          CT Angiogram Head   Final Result           1. No perfusion abnormality to suggest completed or threatened acute   infarct.   2. No arterial cutoff. Narrowing at the bilateral ICA cavernous   segments, estimated at 65% on the right, 75% on the left.   3. A 3 to 4 mm hyperdense focus at the mid brain remains indeterminate,   could be a small focus of hemorrhage or vascular abnormality. Close   follow-up/further evaluation recommended.   4. Possible mucosal lesion in the trachea versus lobulated mucus.           The findings were discussed by telephone with Dr. Sanchez at 1235,   1248, 08/01/2020       This report was finalized on 8/1/2020 12:56 PM by Dr. Jayjay Corbett M.D.              Assessment/Plan   Patient Active Problem List   Diagnosis   • Acute metabolic encephalopathy   • Unresponsive   • COPD (chronic obstructive pulmonary disease) (CMS/HCC)   • Diabetes mellitus (CMS/HCC)   • Hypertension      • ESRD (end stage renal disease) (CMS/[facility])   • UTI (urinary tract infection)   • Elevated troponin   • Anemia due to chronic kidney disease       Assessment:  1. Anemia.  No overt bleeding reported.  Hemoglobin is been fairly stable though did drop to 7.3 today from 7.9.  2. Pancytopenia.  Stable.  3. Chronic kidney disease.  Plans per renal.  Plan for maintenance hemodialysis every Tuesday, Thursday and Saturday.  4. Chest pain.  Cardiology following.  5. Encephalopathy.  Patient is being followed by neurology and psychiatry.  6. COPD.  Stable.  7. Diabetes most type II.  Stable.    Plan:  · Check stool Hemoccult.  · Recommend hematology consult due to pancytopenia.  · No signs or symptoms to suggest urgent endoscopy.      I discussed the patients findings and my recommendations with patient and nursing staff.    MD Hung Mclain M.D.  [facility] [place] [address]  Office: [phone]

## 2020-08-11 NOTE — PROGRESS NOTES
Name: Lucien Avalos ADMIT: 2020   : 1945  PCP: Person, Matilde Kunz MD    MRN: [id number] LOS: 10 days   AGE/SEX: 75 y.o. male  ROOM: [facility]     Subjective   Subjective   Patient appears comfortable & in no apparent distress.  Tolerating PO per RN.  Less than cooperative during interview.    Review of Systems     Objective   Objective   Vital Signs  Temp:  [97.7 °F (36.5 °C)-98.2 °F (36.8 °C)] 97.8 °F (36.6 °C)  Heart Rate:  [89-99] 89  Resp:  [16-18] 18  BP: ()/(50-62) 107/50     on  Flow (L/min):  [2] 2;   Device (Oxygen Therapy): nasal cannula  Body mass index is 25.65 kg/m².     Physical Exam   Constitutional: No distress.   Generally weak & non-toxic   HENT:   Head: Normocephalic and atraumatic.   Eyes: Pupils are equal, round, and reactive to light. EOM are normal.   Neck: Normal range of motion. Neck supple.   Cardiovascular: Normal rate and normal heart sounds.   Pulmonary/Chest: Effort normal and breath sounds normal. No respiratory distress.   Abdominal: Soft. Bowel sounds are normal.   Musculoskeletal: He exhibits edema (dependent BLE) and deformity (RUE AV fistula).   Neurological:   Lethargic & appears non-focal--moves all extremities.   Skin: Skin is warm and dry. He is not diaphoretic.   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  Results from last 7 days   Lab Units 08/10/20  1319 207 20  0604   WBC 10*3/mm3 5.11 5.75 6.04 8.30   HEMOGLOBIN g/dL 7.3* 7.9* 8.5* 8.9*   PLATELETS 10*3/mm3 106* 99* 97* 78*     Results from last 7 days   Lab Units 08/10/20  1319 20  0447 20  0427 20  0604   SODIUM mmol/L 138 136 140 133*   POTASSIUM mmol/L 4.7 4.6 4.9 4.4   CHLORIDE mmol/L 98 100 102 99   CO2 mmol/L 27.1 29.3* 24.6 21.9*   BUN mg/dL 60* 28* 58* 56*   CREATININE mg/dL 6.04* 3.97* 6.99* 8.29*   GLUCOSE mg/dL 207* 151* 190*  142*   Estimated Creatinine Clearance: 11.4 mL/min (A) (by C-G formula based on SCr of 6.04 mg/dL (H)).  Results from last 7 days   Lab Units 08/10/20  1319 08/09/20  0447 08/08/20  0427 08/06/20  0604   ALBUMIN g/dL 3.10* 2.80* 2.60* 2.60*     Results from last 7 days   Lab Units 08/10/20  1319 08/09/20  0447 08/08/20  0427 08/06/20  0604   CALCIUM mg/dL 8.0* 8.1* 8.0* 7.9*   ALBUMIN g/dL 3.10* 2.80* 2.60* 2.60*   MAGNESIUM mg/dL  --   --  2.1  --    PHOSPHORUS mg/dL 3.7 4.6* 4.8* 6.8*       COVID19   Date Value Ref Range Status   08/01/2020 Not Detected Not Detected - Ref. Range Final     Glucose   Date/Time Value Ref Range Status   08/11/2020 1612 181 (H) 70 - 130 mg/dL Final   08/11/2020 0613 245 (H) 70 - 130 mg/dL Final   08/10/2020 2016 250 (H) 70 - 130 mg/dL Final   08/10/2020 0615 204 (H) 70 - 130 mg/dL Final   08/09/2020 2051 287 (H) 70 - 130 mg/dL Final   08/09/2020 1520 135 (H) 70 - 130 mg/dL Final   08/09/2020 1052 144 (H) 70 - 130 mg/dL Final       MRI Brain Without Contrast  Narrative: MRI BRAIN WO CONTRAST-     INDICATIONS: Altered mental status, possible hemorrhage     TECHNIQUE: Multiplanar multisequence noncontrast magnetic resonance  imaging of the brain.     COMPARISON: CT from 08/01/2020     FINDINGS:     Several sequences are significantly degraded by motion artifact,  limiting the assessment.     The diffusion-weighted images show no restricted diffusion to suggest  acute infarct.     The midline structures appear unremarkable.     The brain parenchyma shows moderate periventricular white matter T2  FLAIR signal hyperintensities suggesting chronic small vessel ischemic  change in a patient this age.     Flow voids in the major arteries at the base of the brain appear  unremarkable. Left vertebral artery is dominant.     The paranasal sinuses, mastoid air cells, and orbits appear  unremarkable.     Impression:    No acute infarct. No findings to suggest hemorrhage at the midbrain,  although the  exam is somewhat limited by motion artifact; the CT density  of concern on the earlier CT may represent vascular anomaly. Follow-up  evaluation with enhanced MRI may be helpful when/if possible, and close  follow-up CT could be obtained to ensure stability of the CT finding of  concern.     This report was finalized on 8/1/2020 3:43 PM by Dr. Jayjay Corbett M.D.     XR Chest 1 View  Narrative: XR CHEST 1 VW-     HISTORY: Male who is 75 years-old,  stroke     TECHNIQUE: Frontal view of the chest     COMPARISON: None available     FINDINGS: Heart is mildly enlarged. Mild prominence of vascular and  interstitial markings. Linear likely atelectasis or scarring the left  midlung. No pleural effusion or pneumothorax. No acute osseous process.     Impression: Small likely atelectasis or scarring. Follow-up as  clinically indicated.     This report was finalized on 8/1/2020 1:34 PM by Dr. Jayjay Corbett M.D.     CT Angiogram Head  Narrative: CT ANGIOGRAM HEAD AND NECK AND CT PERFUSION STUDY     CLINICAL HISTORY: Altered mental status.     Radiation dose reduction techniques were utilized, including automated  exposure control and exposure modulation based on body size. CT scan of  the head was obtained with 3 mm axial images without the use  of IV contrast. The patient underwent a CT  perfusion study with a dynamic bolus of IV contrast. Standard perfusion  maps were constructed. The patient then underwent a CT angiogram of the  head and neck with 1 mm axial images following the administration of IV  contrast. Sagittal, coronal, and 3-dimensional reconstructed images were  obtained.  Percent stenosis was assessed in accordance with NASCET  criteria.     FINDINGS:     NONCONTRAST HEAD CT: On the noncontrast head CT, a 3-4 mm hyperdense  focus at the midbrain, for example axial image 17 could be a small focus  of blood or vascular anomaly, further evaluation with MRI may be  helpful, close interval follow-up can  characterize change. Moderate  periventricular hypodensities suggest chronic small vessel ischemic  change in a patient this age. Arterial calcifications are seen at the  base the brain.     No enhancing lesions of brain are noted following contrast material  administration.        CT PERFUSION STUDY:  No CBF (under 30%) deficit or perfusion abnormality  is demonstrated where the T MAX is greater than 6 seconds. The  calculated mismatch volume was 0 cc.     CTA HEAD and neck: The CT angiogram of the head and neck demonstrates  calcifications in the bilateral cervical and intracranial carotid  arteries, with estimated 65% narrowing at the cavernous segment of the  right ICA, 75% narrowing at the origin of the cavernous segment of the  left ICA otherwise without high-grade stenosis (0-49% stenosis). About  20% narrowing at the origin of the left cervical ICA. Otherwise, no  hemodynamically significant focal stenosis, aneurysm, or dissection in  the cervical carotid or vertebral arteries, or in the arteries at the  base of the brain. The left P1 segment is diminutive, the left PCA being  at least partially supplied by the anterior circulation. The lower  aspect of the left vertebral artery is obscured by attenuation artifact  from densely opacified left subclavian vein, precluding its assessment.     Lobulated mucous versus mucosal lesion measuring 1.7 cm in the posterior  right aspect of the trachea at the level of the thoracic inlet, axial  image 65, follow-up or direct visualization recommended.     Cervical spondyloarthropathy is present with uncovertebral joint and  facet hypertrophy result in bilateral neuroforaminal narrowing, more  prominent on the right at C4/5, C5/6, and on the left at C3/4, C5/6.           Impression:       1. No perfusion abnormality to suggest completed or threatened acute  infarct.  2. No arterial cutoff. Narrowing at the bilateral ICA cavernous  segments, estimated at 65% on the right,  75% on the left.  3. A 3 to 4 mm hyperdense focus at the mid brain remains indeterminate,  could be a small focus of hemorrhage or vascular abnormality. Close  follow-up/further evaluation recommended.  4. Possible mucosal lesion in the trachea versus lobulated mucus.        The findings were discussed by telephone with Dr. Sanchez at 1235,  1248, 08/01/2020     This report was finalized on 8/1/2020 12:56 PM by Dr. Jayjay Corbett M.D.     CT Angiogram Neck  Narrative: CT ANGIOGRAM HEAD AND NECK AND CT PERFUSION STUDY     CLINICAL HISTORY: Altered mental status.     Radiation dose reduction techniques were utilized, including automated  exposure control and exposure modulation based on body size. CT scan of  the head was obtained with 3 mm axial images without the use  of IV contrast. The patient underwent a CT  perfusion study with a dynamic bolus of IV contrast. Standard perfusion  maps were constructed. The patient then underwent a CT angiogram of the  head and neck with 1 mm axial images following the administration of IV  contrast. Sagittal, coronal, and 3-dimensional reconstructed images were  obtained.  Percent stenosis was assessed in accordance with NASCET  criteria.     FINDINGS:     NONCONTRAST HEAD CT: On the noncontrast head CT, a 3-4 mm hyperdense  focus at the midbrain, for example axial image 17 could be a small focus  of blood or vascular anomaly, further evaluation with MRI may be  helpful, close interval follow-up can characterize change. Moderate  periventricular hypodensities suggest chronic small vessel ischemic  change in a patient this age. Arterial calcifications are seen at the  base the brain.     No enhancing lesions of brain are noted following contrast material  administration.        CT PERFUSION STUDY:  No CBF (under 30%) deficit or perfusion abnormality  is demonstrated where the T MAX is greater than 6 seconds. The  calculated mismatch volume was 0 cc.     CTA HEAD and neck:  The CT angiogram of the head and neck demonstrates  calcifications in the bilateral cervical and intracranial carotid  arteries, with estimated 65% narrowing at the cavernous segment of the  right ICA, 75% narrowing at the origin of the cavernous segment of the  left ICA otherwise without high-grade stenosis (0-49% stenosis). About  20% narrowing at the origin of the left cervical ICA. Otherwise, no  hemodynamically significant focal stenosis, aneurysm, or dissection in  the cervical carotid or vertebral arteries, or in the arteries at the  base of the brain. The left P1 segment is diminutive, the left PCA being  at least partially supplied by the anterior circulation. The lower  aspect of the left vertebral artery is obscured by attenuation artifact  from densely opacified left subclavian vein, precluding its assessment.     Lobulated mucous versus mucosal lesion measuring 1.7 cm in the posterior  right aspect of the trachea at the level of the thoracic inlet, axial  image 65, follow-up or direct visualization recommended.     Cervical spondyloarthropathy is present with uncovertebral joint and  facet hypertrophy result in bilateral neuroforaminal narrowing, more  prominent on the right at C4/5, C5/6, and on the left at C3/4, C5/6.           Impression:       1. No perfusion abnormality to suggest completed or threatened acute  infarct.  2. No arterial cutoff. Narrowing at the bilateral ICA cavernous  segments, estimated at 65% on the right, 75% on the left.  3. A 3 to 4 mm hyperdense focus at the mid brain remains indeterminate,  could be a small focus of hemorrhage or vascular abnormality. Close  follow-up/further evaluation recommended.  4. Possible mucosal lesion in the trachea versus lobulated mucus.        The findings were discussed by telephone with Dr. Sanchez at 1235,  1248, 08/01/2020     This report was finalized on 8/1/2020 12:56 PM by Dr. Jayjay Corbett M.D.     CT Cerebral Perfusion With &  Without Contrast  Narrative: CT ANGIOGRAM HEAD AND NECK AND CT PERFUSION STUDY     CLINICAL HISTORY: Altered mental status.     Radiation dose reduction techniques were utilized, including automated  exposure control and exposure modulation based on body size. CT scan of  the head was obtained with 3 mm axial images without the use  of IV contrast. The patient underwent a CT  perfusion study with a dynamic bolus of IV contrast. Standard perfusion  maps were constructed. The patient then underwent a CT angiogram of the  head and neck with 1 mm axial images following the administration of IV  contrast. Sagittal, coronal, and 3-dimensional reconstructed images were  obtained.  Percent stenosis was assessed in accordance with NASCET  criteria.     FINDINGS:     NONCONTRAST HEAD CT: On the noncontrast head CT, a 3-4 mm hyperdense  focus at the midbrain, for example axial image 17 could be a small focus  of blood or vascular anomaly, further evaluation with MRI may be  helpful, close interval follow-up can characterize change. Moderate  periventricular hypodensities suggest chronic small vessel ischemic  change in a patient this age. Arterial calcifications are seen at the  base the brain.     No enhancing lesions of brain are noted following contrast material  administration.        CT PERFUSION STUDY:  No CBF (under 30%) deficit or perfusion abnormality  is demonstrated where the T MAX is greater than 6 seconds. The  calculated mismatch volume was 0 cc.     CTA HEAD and neck: The CT angiogram of the head and neck demonstrates  calcifications in the bilateral cervical and intracranial carotid  arteries, with estimated 65% narrowing at the cavernous segment of the  right ICA, 75% narrowing at the origin of the cavernous segment of the  left ICA otherwise without high-grade stenosis (0-49% stenosis). About  20% narrowing at the origin of the left cervical ICA. Otherwise, no  hemodynamically significant focal stenosis,  aneurysm, or dissection in  the cervical carotid or vertebral arteries, or in the arteries at the  base of the brain. The left P1 segment is diminutive, the left PCA being  at least partially supplied by the anterior circulation. The lower  aspect of the left vertebral artery is obscured by attenuation artifact  from densely opacified left subclavian vein, precluding its assessment.     Lobulated mucous versus mucosal lesion measuring 1.7 cm in the posterior  right aspect of the trachea at the level of the thoracic inlet, axial  image 65, follow-up or direct visualization recommended.     Cervical spondyloarthropathy is present with uncovertebral joint and  facet hypertrophy result in bilateral neuroforaminal narrowing, more  prominent on the right at C4/5, C5/6, and on the left at C3/4, C5/6.           Impression:       1. No perfusion abnormality to suggest completed or threatened acute  infarct.  2. No arterial cutoff. Narrowing at the bilateral ICA cavernous  segments, estimated at 65% on the right, 75% on the left.  3. A 3 to 4 mm hyperdense focus at the mid brain remains indeterminate,  could be a small focus of hemorrhage or vascular abnormality. Close  follow-up/further evaluation recommended.  4. Possible mucosal lesion in the trachea versus lobulated mucus.        The findings were discussed by telephone with Dr. Sanchez at 1235,  1248, 08/01/2020     This report was finalized on 8/1/2020 12:56 PM by Dr. Jayjay Corbett M.D.           aspirin 81 mg Oral Daily   bisacodyl 10 mg Rectal Daily   insulin lispro 0-7 Units Subcutaneous TID AC   OLANZapine zydis 10 mg Oral BID   sodium chloride 10 mL Intravenous Q12H      Diet Regular; Cardiac       Assessment/Plan     Active Hospital Problems    Diagnosis  POA   • **Unresponsive [R41.89]  Unknown   • Acute metabolic encephalopathy [G93.41]  Yes   • COPD (chronic obstructive pulmonary disease) (CMS/HCC) [J44.9]  Yes   • Diabetes mellitus (CMS/HCC) [E11.9]   Yes   • Hypertension [I10]  Unknown   • ESRD (end stage renal disease) (CMS/[facility]) [N18.6]  Yes   • UTI (urinary tract infection) [N39.0]  Unknown   • Elevated troponin [R79.89]  Unknown   • Anemia due to chronic kidney disease [N18.9, D63.1]  Unknown      Resolved Hospital Problems   No resolved problems to display.       75 y.o. male admitted with Unresponsive.      Unresponsive.  ?  Selective mutism.  Consult behavioral services.    Acute metabolic encephalopathy.  CTA head, neck, perfusion study report findings no perfusion abnormality to suggest complete recurrent acute infarct.  No arterial cutoff.    COPD (chronic obstructive pulmonary disease) (CMS/[facility])    Diabetes mellitus (CMS/[facility]).  Glucoscan acceptable.  Continue low-dose correctional lispro sliding scale.  RN reports patient eats independently.    Hypertension.  BP rather low.      ESRD (end stage renal disease) (CMS/[facility]).  Nephrology following, input appreciated.  Needs volume removed.    UTI (urinary tract infection).  Patient received empiric antibiotic therapy ceftriaxone and noted urine culture NGTD, afebrile, and no evidence of leukocytosis.    Elevated troponin.  Chronic elevation likely 2/2 ESRD.  No signs of ACS or angina.    Anemia due to chronic kidney disease / thrombocytopenia.  Likely secondary to ESRD versus GIB versus both.  Noted fecal occult blood positive, hemoglobin trending downward. Retacrit per nephrology.  Oncology consulted and recommend transfuse 2 units packed RBC with next dialysis, check additional labs iron profile, ferritin, haptoglobin, LDH, B12, folate.  Plan for pRBC transfusion x1 on 8/11/2020, monitor H&H.  Consider temporary cessation of ASA.  GI following, input appreciated--believes endoscopic evaluation will be problematic as patient will unlikely cooperate with bowel prep as he is currently refusing MiraLAX; however, if he is cooperative GI would consider bowel prep and potentially perform EGD colonoscopy and to  evaluate anemia.    · SCD for DVT prophylaxis.  · No CPR  · Discussed with Dr. Otto  · Anticipate discharge TBD / CCP following plan for [facility]  [facility]  08/11/20  16:37    I wore protective equipment throughout this patient encounter including a face mask, gloves and protective eyewear.  Hand hygiene was performed before donning protective equipment and after removal when leaving the room.

## 2020-08-11 NOTE — PLAN OF CARE
Problem: Patient Care Overview  Goal: Plan of Care Review  Outcome: Ongoing (interventions implemented as appropriate)  Flowsheets  Taken 8/11/2020 0450  Progress: no change  Outcome Summary: VSS, refusing to wear heart monitor and O2 sensor, frequently calling out for staff, yelling/cussing at staff, several attempts to get OOB, up to bathroom several times, unsteady gait, bed alarm on, possible DC today, will continue to monitor  Taken 8/10/2020 6658  Plan of Care Reviewed With: patient

## 2020-08-11 NOTE — CONSULTS
Subjective     REASON FOR CONSULTATION: Anemia and thrombocytopenia on a patient with end-stage renal disease on hemodialysis  Provide an opinion on any further workup or treatment                             REQUESTING PHYSICIAN: Dr. Otto    RECORDS OBTAINED:  Records of the patients history including those obtained from the referring provider were reviewed and summarized in detail.    HISTORY OF PRESENT ILLNESS:  The patient is a 75 y.o. year old male who is here for an opinion about the above issue.  He has end-stage renal disease and is on chronic hemodialysis.  He has been on oral iron supplementation in the past but his hemoglobin is lower on this admission with a hemoglobin of 7.3 g/dL.  Platelets are also low around 100,000.  White cells are normal.    Stools were heme positive.  He is on aspirin currently but not on any other blood thinner medications.  He was in dialysis most of the day and his dialysis nurse reports that they were able to remove excess fluid.  He is not currently receiving any AMBIKA with his inpatient dialysis but according to Dr. Oliver he was receiving Procrit 3 times a week IV with his dialysis as an outpatient.    History of Present Illness     Past Medical History:   Diagnosis Date   • Anxiety    • CHF (congestive heart failure) (CMS/[facility])    • Chronic pain    • COPD (chronic obstructive pulmonary disease) (CMS/[facility])    • Diabetes mellitus (CMS/[facility])    • GERD (gastroesophageal reflux disease)    • Hyperlipidemia    • Hypertension    • Hypotension    • Insomnia    • Mood disorder (CMS/H[facility])    • Renal failure    • Respiratory failure (CMS/[facility])         History reviewed. No pertinent surgical history.     No current facility-administered medications on file prior to encounter.      Current Outpatient Medications on File Prior to Encounter   Medication Sig Dispense Refill   • albuterol sulfate  (90 Base) MCG/ACT inhaler Inhale 2 puffs Every 6 (Six) Hours As Needed for Wheezing.      • atorvastatin (LIPITOR) 40 MG tablet Take 40 mg by mouth Daily.     • clopidogrel (PLAVIX) 75 MG tablet Take 75 mg by mouth Daily.     • FLUoxetine (PROzac) 20 MG capsule Take 20 mg by mouth Daily.     • HYDROcodone-acetaminophen (NORCO) 5-325 MG per tablet Take 1 tablet by mouth Every 6 (Six) Hours As Needed.     • hydrOXYzine (ATARAX) 50 MG tablet Take 50 mg by mouth Every 12 (Twelve) Hours.     • insulin detemir (LEVEMIR) 100 UNIT/ML injection Inject 30 Units under the skin into the appropriate area as directed Daily.     • ipratropium-albuterol (COMBIVENT RESPIMAT)  MCG/ACT inhaler Inhale 2 puffs Every 8 (Eight) Hours As Needed for Wheezing.     • losartan (COZAAR) 25 MG tablet Take 25 mg by mouth Daily.     • midodrine (PROAMATINE) 10 MG tablet Take 10 mg by mouth Daily. Give before dialysis, on dialysis day (Mon, Wed, Fri)     • Nutritional Supplements (PROMOD PO) Take 30 mL by mouth Daily.     • pantoprazole (PROTONIX) 40 MG EC tablet Take 40 mg by mouth 2 (two) times a day.     • sucralfate (CARAFATE) 1 g tablet Take 1 g by mouth 4 (Four) Times a Day.     • Sucroferric Oxyhydroxide (Velphoro) 500 MG chewable tablet Chew 1 tablet 3 (Three) Times a Day.     • temazepam (RESTORIL) 15 MG capsule Take 15 mg by mouth Every Night.          ALLERGIES:  No Known Allergies     Social History     Socioeconomic History   • Marital status: Unknown     Spouse name: Not on file   • Number of children: Not on file   • Years of education: Not on file   • Highest education level: Not on file   Tobacco Use   • Smoking status: Former Smoker     Types: Cigarettes   • Smokeless tobacco: Never Used   Substance and Sexual Activity   • Alcohol use: Never     Frequency: Never        History reviewed. No pertinent family history.     Review of Systems   Constitutional: Positive for fatigue. Negative for activity change, chills and fever.   HENT: Negative for mouth sores, trouble swallowing and voice change.    Eyes: Negative  for pain and visual disturbance.   Respiratory: Negative for cough, shortness of breath and wheezing.    Cardiovascular: Negative for chest pain and palpitations.   Gastrointestinal: Negative for abdominal pain, constipation, diarrhea, nausea and vomiting.   Genitourinary: Negative for difficulty urinating, frequency and urgency.   Musculoskeletal: Negative for arthralgias and joint swelling.   Skin: Positive for pallor. Negative for rash.   Neurological: Positive for weakness. Negative for dizziness, seizures and headaches.   Hematological: Negative for adenopathy. Bruises/bleeds easily.   Psychiatric/Behavioral: Positive for agitation, behavioral problems and dysphoric mood. Negative for confusion. The patient is not nervous/anxious.         Objective     Vitals:    08/10/20 1400 08/10/20 2315 08/11/20 0700 08/11/20 0915   BP:  98/53 105/62 100/54   BP Location:  Left arm Left arm Left arm   Patient Position:  Lying Lying Lying   Pulse:  99 97 92   Resp:  18 18 16   Temp: Comment: pt refused vitals and ACHS 97.7 °F (36.5 °C) 98 °F (36.7 °C) 98.2 °F (36.8 °C)   TempSrc:  Oral Oral Oral   SpO2:       Weight:       Height:         No flowsheet data found.    Physical Exam   Constitutional: He is oriented to person, place, and time. He appears well-developed and well-nourished. No distress.   HENT:   Head: Normocephalic.   Eyes: Pupils are equal, round, and reactive to light. Conjunctivae and EOM are normal. No scleral icterus.   Neck: Normal range of motion. Neck supple. No JVD present. No thyromegaly present.   Cardiovascular: Normal rate and regular rhythm. Exam reveals no gallop and no friction rub.   No murmur heard.  Pulmonary/Chest: Effort normal and breath sounds normal. He has no wheezes. He has no rales.   Abdominal: Soft. He exhibits no distension and no mass. There is no tenderness.   Musculoskeletal: Normal range of motion. He exhibits edema. He exhibits no deformity.   Dialysis shunt in the right upper  extremity   Lymphadenopathy:     He has no cervical adenopathy.   Neurological: He is alert and oriented to person, place, and time. He has normal reflexes. No cranial nerve deficit.   Skin: Skin is warm and dry. No rash noted. No erythema. There is pallor.   Psychiatric: Judgment normal.   Irascible         RECENT LABS:  Hematology WBC   Date Value Ref Range Status   08/10/2020 5.11 3.40 - 10.80 10*3/mm3 Final     RBC   Date Value Ref Range Status   08/10/2020 2.51 (L) 4.14 - 5.80 10*6/mm3 Final     Hemoglobin   Date Value Ref Range Status   08/10/2020 7.3 (L) 13.0 - 17.7 g/dL Final     Hematocrit   Date Value Ref Range Status   08/10/2020 23.3 (L) 37.5 - 51.0 % Final     Platelets   Date Value Ref Range Status   08/10/2020 106 (L) 140 - 450 10*3/mm3 Final        Lab Results   Component Value Date    GLUCOSE 207 (H) 08/10/2020    CALCIUM 8.0 (L) 08/10/2020     08/10/2020    K 4.7 08/10/2020    CO2 27.1 08/10/2020    CL 98 08/10/2020    BUN 60 (H) 08/10/2020    CREATININE 6.04 (H) 08/10/2020    EGFRIFAFRI  08/10/2020      Comment:      <15 Indicative of kidney failure.    EGFRIFNONA 9 (L) 08/10/2020    BCR 9.9 08/10/2020    ANIONGAP 12.9 08/10/2020       Assessment/Plan   1.  Anemia which is almost certainly multifactorial.  The patient has heme positive stool and has end-stage renal disease on hemodialysis.  We will need to check for any deficiency state particularly iron in the setting.  He has been receiving Procrit IV 3 times a week with his outpatient dialysis.  In the meantime we will plan to transfuse packed red blood cells with his next dialysis.  2.  Thrombocytopenia.  This is chronic and has been stable and in a safe range around ,000 since December 2019.  We will check an IPF    Recommendations  1.  Transfuse 2 units packed red blood cells with next dialysis  2.  Check additional labs including iron profile and ferritin, reticulocyte count, haptoglobin, LDH, B12 and folate.  3.  We will defer  to the admitting team whether or not patient requires GI evaluation for heme positive stool.  4.  We will follow along.    Addendum:  I did discuss his situation with Dr. Dooley of nephrology.  Unfortunately he had already finished dialysis before we ordered the transfusion.  She feels that he can receive 1 unit of packed red blood cells today since he had fluid volume removed with his dialysis today.

## 2020-08-11 NOTE — PROGRESS NOTES
"   LOS: 10 days    Patient Care Team:  Matilde Hi MD as PCP - General (Internal Medicine)    Chief Complaint:    Chief Complaint   Patient presents with   • Altered Mental Status   • Hypotension     Follow-up end-stage renal  Subjective     Interval History:   On dialysis.  Agitated, coughing loudly after eating potato chip on dialysis.  SOA with conversation.  Above dry weight by 4 kg.  UF increased to 3 kg.  Patient says he will have to come back another day, but after talking with him, he calmed down some.  O2 Sat 97%.   systolic.       Review of Systems:   As noted    Objective     Vital Signs  Temp:  [97.7 °F (36.5 °C)-98.2 °F (36.8 °C)] 98.2 °F (36.8 °C)  Heart Rate:  [92-99] 92  Resp:  [16-18] 16  BP: ()/(53-62) 100/54    Flowsheet Rows      First Filed Value   Admission Height  170.2 cm (67\") Documented at 08/01/2020 1247   Admission Weight  71.7 kg (158 lb 1.6 oz) Documented at 08/01/2020 1210          No intake/output data recorded.  I/O last 3 completed shifts:  In: 920 [P.O.:920]  Out: -     Intake/Output Summary (Last 24 hours) at 8/11/2020 1123  Last data filed at 8/11/2020 0210  Gross per 24 hour   Intake 480 ml   Output --   Net 480 ml       Physical Exam:  General Appearance: On dialysis.  Qb 400.  systolic  Goal 3 kg .  Coughing .  Skin: warm and dry  HEENT: Pupils round reactive to light, nonicteric sclerae  Neck: No JVD.  Lungs: Clear to auscultation, no wheezing .  Heart: RRR, normal S1 and S2, no S3, no rub  Abdomen: soft, nontender,+ bs.  Extremities:RUE AVF patent . 2+ lower ext edema.           Results Review:    Results from last 7 days   Lab Units 08/10/20  1319 08/09/20  0447 08/08/20  0427   SODIUM mmol/L 138 136 140   POTASSIUM mmol/L 4.7 4.6 4.9   CHLORIDE mmol/L 98 100 102   CO2 mmol/L 27.1 29.3* 24.6   BUN mg/dL 60* 28* 58*   CREATININE mg/dL 6.04* 3.97* 6.99*   CALCIUM mg/dL 8.0* 8.1* 8.0*   GLUCOSE mg/dL 207* 151* 190*       Estimated Creatinine " Clearance: 11.4 mL/min (A) (by C-G formula based on SCr of 6.04 mg/dL (H)).    Results from last 7 days   Lab Units 08/10/20  1319 08/09/20 0447 08/08/20  0427   MAGNESIUM mg/dL  --   --  2.1   PHOSPHORUS mg/dL 3.7 4.6* 4.8*             Results from last 7 days   Lab Units 08/10/20  1319 08/09/20  0447 08/08/20  0427 08/06/20  0604 08/05/20  0510   WBC 10*3/mm3 5.11 5.75 6.04 8.30 6.22   HEMOGLOBIN g/dL 7.3* 7.9* 8.5* 8.9* 8.9*   PLATELETS 10*3/mm3 106* 99* 97* 78* 89*               Imaging Results (Last 24 Hours)     ** No results found for the last 24 hours. **          aspirin 81 mg Oral Daily   bisacodyl 10 mg Rectal Daily   insulin lispro 0-7 Units Subcutaneous TID AC   OLANZapine zydis 10 mg Oral BID   sodium chloride 10 mL Intravenous Q12H          Medication Review:   Current Facility-Administered Medications   Medication Dose Route Frequency Provider Last Rate Last Dose   • acetaminophen (TYLENOL) tablet 650 mg  650 mg Oral Q4H PRN Rainer Stevenson MD   650 mg at 08/11/20 0840    Or   • acetaminophen (TYLENOL) 160 MG/5ML solution 650 mg  650 mg Oral Q4H PRN Rainer Stevenson MD        Or   • acetaminophen (TYLENOL) suppository 650 mg  650 mg Rectal Q4H PRN Rainer Stevenson MD       • albumin human 25 % IV SOLN 12.5 g  12.5 g Intravenous PRN Janet Garcia MD       • albumin human 25 % IV SOLN 25 g  25 g Intravenous 4x Daily PRN Connor Vazquez MD       • aspirin chewable tablet 81 mg  81 mg Oral Daily Janet Garrett MD   81 mg at 08/11/20 0834   • bisacodyl (DULCOLAX) suppository 10 mg  10 mg Rectal Daily Hung Rosenberg MD       • dextrose (D50W) 25 g/ 50mL Intravenous Solution 25 g  25 g Intravenous Q15 Min PRN Vince Otto MD       • dextrose (GLUTOSE) oral gel 15 g  15 g Oral Q15 Min PRN Vince Otto MD       • glucagon (human recombinant) (GLUCAGEN DIAGNOSTIC) injection 1 mg  1 mg Subcutaneous Q15 Min PRN Vince Otto MD       • insulin lispro (humaLOG) injection 0-7 Units   0-7 Units Subcutaneous TID AC Vince Otto MD   3 Units at 08/11/20 0840   • nitroglycerin (NITROSTAT) ointment 0.5 inch  0.5 inch Topical Q6H PRN Janet Garrett MD       • nitroglycerin (NITROSTAT) SL tablet 0.4 mg  0.4 mg Sublingual Q5 Min PRN Rainer Stevenson MD   0.4 mg at 08/08/20 0447   • OLANZapine zydis (ZyPREXA) disintegrating tablet 10 mg  10 mg Oral BID Luis F Myers III, MD   10 mg at 08/11/20 0834   • ondansetron (ZOFRAN) injection 4 mg  4 mg Intravenous Q6H PRN Rainer Stevenson MD   4 mg at 08/09/20 0644   • sodium chloride 0.9 % flush 10 mL  10 mL Intravenous PRN Rainer Stevneson MD       • sodium chloride 0.9 % flush 10 mL  10 mL Intravenous Q12H Rainer Stevenson MD   10 mL at 08/11/20 0834   • sodium chloride 0.9 % flush 10 mL  10 mL Intravenous PRN Rainer Stevenson MD           Assessment/Plan   1.  ESRD.  Dialysis today.  Needs volume removed.    2.  Altered mental state.    3.  Anemia and thrombocytopenia.  Hg falling.  Heme positive. Agree with Dr. Rosenberg that endoscopy would be high risk with recent NSTEMI, lack of cooperation. May need PRBC.   4.  Diabetes mellitus type 2  5.  Pyuria.  No growth on urine culture.       Plan:  1. Dialysis today.  2. Recheck Hg later today after volume off.       Jasmin Dooley MD  08/11/20  11:23

## 2020-08-11 NOTE — PLAN OF CARE
Problem: Patient Care Overview  Goal: Plan of Care Review  Outcome: Ongoing (interventions implemented as appropriate)  Flowsheets (Taken 8/11/2020 0086)  Progress: no change  Plan of Care Reviewed With: patient  Outcome Summary: VSS. No falls. Pt still refusing to wear heart monitor, O2 sensor, and frequently takes NC off. Still frequently calling out for staff, verbally aggressive with staff. Pt frequently attempts to get out of bed, gets frustrated when staff offers help. Bed alarm on. Gait seems to be more unsteady than yesterday. Pt had dialysis today. Orders placed for blood transfusion, 1 unit. New IV placed in LFA. Will CTM.

## 2020-08-11 NOTE — PROGRESS NOTES
Gastroenterology   Inpatient Progress Note    Reason for Follow Up: Anemia    Subjective     Interval History:   Patient agitated this morning as previously.  He is upset about various issues.  He complains this morning primarily of constipation.  He declines to take MiraLAX.    Current Facility-Administered Medications:   •  acetaminophen (TYLENOL) tablet 650 mg, 650 mg, Oral, Q4H PRN, 650 mg at 08/11/20 0840 **OR** acetaminophen (TYLENOL) 160 MG/5ML solution 650 mg, 650 mg, Oral, Q4H PRN **OR** acetaminophen (TYLENOL) suppository 650 mg, 650 mg, Rectal, Q4H PRN, Rainer Stevenson MD  •  albumin human 25 % IV SOLN 12.5 g, 12.5 g, Intravenous, PRN, Janet Garcia MD  •  albumin human 25 % IV SOLN 25 g, 25 g, Intravenous, 4x Daily PRN, Connor Vazquez MD  •  aspirin chewable tablet 81 mg, 81 mg, Oral, Daily, Janet Garrett MD, 81 mg at 08/11/20 0834  •  dextrose (D50W) 25 g/ 50mL Intravenous Solution 25 g, 25 g, Intravenous, Q15 Min PRN, Vince Otto MD  •  dextrose (GLUTOSE) oral gel 15 g, 15 g, Oral, Q15 Min PRN, Vince Otto MD  •  glucagon (human recombinant) (GLUCAGEN DIAGNOSTIC) injection 1 mg, 1 mg, Subcutaneous, Q15 Min PRN, Vince Otto MD  •  insulin lispro (humaLOG) injection 0-7 Units, 0-7 Units, Subcutaneous, TID AC, Vince Otto MD, 3 Units at 08/11/20 0840  •  nitroglycerin (NITROSTAT) ointment 0.5 inch, 0.5 inch, Topical, Q6H PRN, Janet Garrett MD  •  nitroglycerin (NITROSTAT) SL tablet 0.4 mg, 0.4 mg, Sublingual, Q5 Min PRN, Rainer Stevenson MD, 0.4 mg at 08/08/20 0447  •  OLANZapine zydis (ZyPREXA) disintegrating tablet 10 mg, 10 mg, Oral, BID, [place]ver, Luis F [place] III, MD, 10 mg at 08/11/20 0834  •  ondansetron (ZOFRAN) injection 4 mg, 4 mg, Intravenous, Q6H PRN, Rainer Stevenson MD, 4 mg at 08/09/20 0644  •  sodium chloride 0.9 % flush 10 mL, 10 mL, Intravenous, PRN, Rainer Stevenson MD  •  sodium chloride 0.9 % flush 10 mL, 10 mL, Intravenous, Q12H, Rainer Stevenson,  MD, 10 mL at 08/11/20 0834  •  sodium chloride 0.9 % flush 10 mL, 10 mL, Intravenous, PRN, Rainer Stevenson MD  Review of Systems:    The following systems were reviewed and negative;  constitution    Objective     Vital Signs  Temp:  [97.7 °F (36.5 °C)-98 °F (36.7 °C)] 98 °F (36.7 °C)  Heart Rate:  [97-99] 97  Resp:  [18] 18  BP: ()/(53-62) 105/62  Body mass index is 25.65 kg/m².    Intake/Output Summary (Last 24 hours) at 8/11/2020 0858  Last data filed at 8/11/2020 0210  Gross per 24 hour   Intake 480 ml   Output --   Net 480 ml     No intake/output data recorded.     Physical Exam:   General: patient awake, alert and agitated.  Patient seems somewhat short of breath with minimal activity to me this morning.   Eyes: no scleral icterus   Skin: warm and dry, not jaundiced   Abdomen: soft, nontender, minimally distended with some tympanic sounds but no guarding or rebound,; normal bowel sounds, no masses palpated, no periumbical lymphadenopathy   Psychiatric: Agitated behavior     Results Review:     I reviewed the patient's new clinical results.    Results from last 7 days   Lab Units 08/10/20  1319 08/09/20 0447 08/08/20  0427   WBC 10*3/mm3 5.11 5.75 6.04   HEMOGLOBIN g/dL 7.3* 7.9* 8.5*   HEMATOCRIT % 23.3* 24.7* 27.6*   PLATELETS 10*3/mm3 106* 99* 97*     Results from last 7 days   Lab Units 08/10/20  1319 08/09/20 0447 08/08/20  0427   SODIUM mmol/L 138 136 140   POTASSIUM mmol/L 4.7 4.6 4.9   CHLORIDE mmol/L 98 100 102   CO2 mmol/L 27.1 29.3* 24.6   BUN mg/dL 60* 28* 58*   CREATININE mg/dL 6.04* 3.97* 6.99*   CALCIUM mg/dL 8.0* 8.1* 8.0*   GLUCOSE mg/dL 207* 151* 190*         Lab Results   Lab Value Date/Time    LIPASE 23 04/03/2020 2355    LIPASE 85 12/19/2019 0925    LIPASE 140 09/01/2019 1645    LIPASE 84 05/09/2019 1317    LIPASE 147 02/25/2019 0558       Radiology:  MRI Brain Without Contrast   Final Result       No acute infarct. No findings to suggest hemorrhage at the midbrain,   although the  exam is somewhat limited by motion artifact; the CT density   of concern on the earlier CT may represent vascular anomaly. Follow-up   evaluation with enhanced MRI may be helpful when/if possible, and close   follow-up CT could be obtained to ensure stability of the CT finding of   concern.       This report was finalized on 8/1/2020 3:43 PM by Dr. Jayjay Corbett M.D.          XR Chest 1 View   Final Result   Small likely atelectasis or scarring. Follow-up as   clinically indicated.       This report was finalized on 8/1/2020 1:34 PM by Dr. Jayjay Corbett M.D.          CT Cerebral Perfusion With & Without Contrast   Final Result           1. No perfusion abnormality to suggest completed or threatened acute   infarct.   2. No arterial cutoff. Narrowing at the bilateral ICA cavernous   segments, estimated at 65% on the right, 75% on the left.   3. A 3 to 4 mm hyperdense focus at the mid brain remains indeterminate,   could be a small focus of hemorrhage or vascular abnormality. Close   follow-up/further evaluation recommended.   4. Possible mucosal lesion in the trachea versus lobulated mucus.           The findings were discussed by telephone with Dr. Sanchez at [phone], 2613, 08/01/2020       This report was finalized on 8/1/2020 12:56 PM by Dr. Jayjay Corbett M.D.          CT Angiogram Neck   Final Result           1. No perfusion abnormality to suggest completed or threatened acute   infarct.   2. No arterial cutoff. Narrowing at the bilateral ICA cavernous   segments, estimated at 65% on the right, 75% on the left.   3. A 3 to 4 mm hyperdense focus at the mid brain remains indeterminate,   could be a small focus of hemorrhage or vascular abnormality. Close   follow-up/further evaluation recommended.   4. Possible mucosal lesion in the trachea versus lobulated mucus.           The findings were discussed by telephone with Dr. Sanchez at [phone],   1247, 08/01/2020       This report was finalized on  8/1/2020 12:56 PM by Dr. Jayjay Corbett M.D.          CT Angiogram Head   Final Result           1. No perfusion abnormality to suggest completed or threatened acute   infarct.   2. No arterial cutoff. Narrowing at the bilateral ICA cavernous   segments, estimated at 65% on the right, 75% on the left.   3. A 3 to 4 mm hyperdense focus at the mid brain remains indeterminate,   could be a small focus of hemorrhage or vascular abnormality. Close   follow-up/further evaluation recommended.   4. Possible mucosal lesion in the trachea versus lobulated mucus.           The findings were discussed by telephone with Dr. Sanchez at 1235,   1248, 08/01/2020       This report was finalized on 8/1/2020 12:56 PM by Dr. Jayjay Corbett M.D.              Assessment/Plan     Patient Active Problem List   Diagnosis   • Acute metabolic encephalopathy   • Unresponsive   • COPD (chronic obstructive pulmonary disease) (CMS/[facility])   • Diabetes mellitus (CMS/[facility])   • Hypertension   • ESRD (end stage renal disease) (CMS/[facility])   • UTI (urinary tract infection)   • Elevated troponin   • Anemia due to chronic kidney disease       Assessment:  1. Anemia.  Stool is heme positive.  Hemoglobin has drifted down slightly.  No overt bleeding reported.  2. Acute metabolic encephalopathy.  Patient still very agitated and uncooperative.  Followed by psychiatry and neurology.  3. Chest pain.  Cardiology is following.  4. His mellitus type II.  Stable.  5. COPD.  Patient seems short of breath to me this morning.  6. Chronic kidney disease.  Plan dialysis today.  7. Constipation.  This is a new problem.  Patient declines MiraLAX but may try Dulcolax suppository.      Plan:  · Patient has anemia and heme-positive stools.  I think endoscopic evaluation with him will be problematic.  Patient has CHF and a recent non-Q MI followed by cardiology as well as COPD and end-stage renal disease.  He is certainly high risk for any endoscopic  procedures/sedation.  He also is uncooperative and refuses to take MiraLAX so I do not think he would take a bowel prep necessary for colonoscopy.  · Monitor H&H.  · If overall condition improves  and he is cooperative with a bowel prep could potentially do an EGD and colonoscopy for evaluation of anemia and heme positive stool but again I think this is going to be extremely difficult with this patient.  I discussed the patients findings and my recommendations with patient and nursing staff.    MD Hung Mclain M.D.  [facility] [place] [address]  Office: [phone]

## 2020-08-11 NOTE — NURSING NOTE
Awake, sitting up in bed. RN in room charting. Patient has not required restraints this shift. Plan is for patient to have dialysis today and then discharge back to [facility]. Access will sign off.

## 2020-08-11 NOTE — NURSING NOTE
Spoke with sister, ROHINI, Kaylin Ferrera, she gave telephone consent for blood transfusion.  She is also concerned that he will not allow us to transfuse.  She asked if we could sedate him in order to complete the transfusion.  Also voiced concerns about his anger issues and him being able to return to [facility]. His bed will  this upcoming Saturday.

## 2020-08-11 NOTE — PROGRESS NOTES
Continued Stay Note  [facility]     Patient Name: Lucien Avalos  MRN: [id number]  Today's Date: 8/11/2020    Admit Date: 8/1/2020    Discharge Plan     Row Name 08/11/20 1501       Plan    Plan  [facility] when medically ready via EMS    Patient/Family in Agreement with Plan  other (see comments)    Plan Comments  Pt receiving more testing and transfusion today. CCP called Nalle[facility] and left message regarding bed status to return. Packet in juan a. Olivia WEBBER/CCP        Discharge Codes    No documentation.       Expected Discharge Date and Time     Expected Discharge Date Expected Discharge Time    Aug 7, 2020             Archana Lott, RN

## 2020-08-11 NOTE — PROGRESS NOTES
"[facility]  Cardiology Progress Note    Patient Identification:  Name: Lucien Avalos  Age: 75 y.o.  Sex: male  :  1945  MRN: [id number]                 Follow Up / Chief Complaint: management recommendations of elevated troponin chest pain    Interval History:non-Q wave MI.  Treat conservatively.  Fecal occult blood positive     Subjective:      Objective:    Past Medical History:  Past Medical History:   Diagnosis Date   • Anxiety    • CHF (congestive heart failure) (CMS/HCC)    • Chronic pain    • COPD (chronic obstructive pulmonary disease) (CMS/HCC)    • Diabetes mellitus (CMS/HCC)    • GERD (gastroesophageal reflux disease)    • Hyperlipidemia    • Hypertension    • Hypotension    • Insomnia    • Mood disorder (CMS/HCC)    • Renal failure    • Respiratory failure (CMS/HCC)      Past Surgical History:  History reviewed. No pertinent surgical history.     Social History:   Social History     Tobacco Use   • Smoking status: Former Smoker     Types: Cigarettes   • Smokeless tobacco: Never Used   Substance Use Topics   • Alcohol use: Never     Frequency: Never      Family History:  History reviewed. No pertinent family history.       Allergies:  No Known Allergies  Scheduled Meds:    aspirin 81 mg Daily   bisacodyl 10 mg Daily   insulin lispro 0-7 Units TID AC   OLANZapine zydis 10 mg BID   sodium chloride 10 mL Q12H           INTAKE AND OUTPUT:    Intake/Output Summary (Last 24 hours) at 2020 1616  Last data filed at 2020 1255  Gross per 24 hour   Intake 480 ml   Output 2400 ml   Net -1920 ml        ROS  Constitutional: Awake and alert, no fever. No nosebleeds  Abdomen           no abdominal pain   Cardiac              no chest pain  Pulmonary          no shortness of breath      /62 (BP Location: Left arm, Patient Position: Lying)   Pulse 66   Temp 97.7 °F (36.5 °C) (Oral)   Resp 16   Ht 170.2 cm (67.01\")   Wt 74.3 kg (163 lb 12.8 oz) Comment: not weighed by RD  SpO2 " "96%   BMI 25.65 kg/m²   General appearance: No acute changes   Neck: Trachea midline; NECK, supple, no thyromegaly or lymphadenopathy   Lungs: Normal size and shape, normal breath sounds, equal distribution of air, no rales and rhonchi   CV: S1-S2 regular, no murmurs, no rub, no gallop   Abdomen: Soft, non-tender; no masses , no abnormal abdominal sounds   Extremities: No deformity , normal color , no peripheral edema   Skin: Normal temperature, turgor and texture; no rash, ulcers          /50 (BP Location: Left arm, Patient Position: Lying)   Pulse 89   Temp 97.8 °F (36.6 °C) (Oral)   Resp 18   Ht 170.2 cm (67.01\")   Wt 74.3 kg (163 lb 12.8 oz) Comment: not weighed by RD  SpO2 92%   BMI 25.65 kg/m²           Cardiographics  Telemetry:           ECG:           Echocardiogram:   Summary   The ejection fraction biplane was calculated at 42 %.   Hypokinetic motion of the apical inferior wall noted in the left ventricle.   Moderate concentric left ventricular hypertrophy observed.   The left atrium is mildly dilated.   There is mild right atrial enlargement.   Moderate aortic stenosis is present.   Mild mitral regurgitation is present.   Mild tricuspid regurgitation.  Lab Review   Results from last 7 days   Lab Units 08/08/20  0427   TROPONIN T ng/mL 5.620*     Results from last 7 days   Lab Units 08/08/20  0427   MAGNESIUM mg/dL 2.1     Results from last 7 days   Lab Units 08/10/20  1319   SODIUM mmol/L 138   POTASSIUM mmol/L 4.7   BUN mg/dL 60*   CREATININE mg/dL 6.04*   CALCIUM mg/dL 8.0*        Results from last 7 days   Lab Units 08/10/20  1319 08/09/20  0447 08/08/20  0427   WBC 10*3/mm3 5.11 5.75 6.04   HEMOGLOBIN g/dL 7.3* 7.9* 8.5*   HEMATOCRIT % 23.3* 24.7* 27.6*   PLATELETS 10*3/mm3 106* 99* 97*        The following medical decision was discussed in detail with Dr. Kuhn    Assessment:  1. Non-Q wave MI  2. Dementia      Plan:  Sinus rhythm on monitor.  Blood pressures remain low normal.  " "He has had a Q wave MI and he will be treated conservatively.  Continue aspirin.  Unable to add beta-blocker at this time.  Dialysis per nephrology.  Hemoglobin today yesterday 7.3.  Today with positive heme stool, defer to GI and IM      )8/11/2020  Sherman Kuhn MD             [facility]/Transcription:   \"Dictated utilizing Dragon dictation\".     "

## 2020-08-12 NOTE — PROGRESS NOTES
"[facility]  Cardiology Progress Note    Patient Identification:  Name: Lucien Avalos  Age: 75 y.o.  Sex: male  :  1945  MRN: [id number]                 Follow Up / Chief Complaint: management recommendations of elevated troponin chest pain    Interval History: Treat conservatively     Subjective:      Objective:    Past Medical History:  Past Medical History:   Diagnosis Date   • Anxiety    • CHF (congestive heart failure) (CMS/HCC)    • Chronic pain    • COPD (chronic obstructive pulmonary disease) (CMS/HCC)    • Diabetes mellitus (CMS/HCC)    • GERD (gastroesophageal reflux disease)    • Hyperlipidemia    • Hypertension    • Hypotension    • Insomnia    • Mood disorder (CMS/HCC)    • Renal failure    • Respiratory failure (CMS/HCC)      Past Surgical History:  History reviewed. No pertinent surgical history.     Social History:   Social History     Tobacco Use   • Smoking status: Former Smoker     Types: Cigarettes   • Smokeless tobacco: Never Used   Substance Use Topics   • Alcohol use: Never     Frequency: Never      Family History:  History reviewed. No pertinent family history.       Allergies:  No Known Allergies  Scheduled Meds:    aspirin 81 mg Daily   bisacodyl 10 mg Daily   folic acid 1 mg Daily   insulin lispro 0-7 Units TID AC   [START ON 2020] OLANZapine zydis 10 mg Daily   OLANZapine zydis 20 mg Nightly   sodium chloride 10 mL Q12H           INTAKE AND OUTPUT:    Intake/Output Summary (Last 24 hours) at 2020 1445  Last data filed at 2020 0856  Gross per 24 hour   Intake 1470 ml   Output --   Net 1470 ml      ROS  Constitutional: Awake and alert, no fever. No nosebleeds  Abdomen           no abdominal pain   Cardiac              no chest pain  Pulmonary          no shortness of breath      BP 95/55 (BP Location: Left arm, Patient Position: Lying)   Pulse 94   Temp 98.2 °F (36.8 °C) (Oral)   Resp 16   Ht 170.2 cm (67.01\")   Wt 74.3 kg (163 lb 12.8 oz) " Comment: not weighed by RD  SpO2 94%   BMI 25.65 kg/m²   General appearance: No acute changes   Neck: Trachea midline; NECK, supple, no thyromegaly or lymphadenopathy   Lungs: Normal size and shape, normal breath sounds, equal distribution of air, no rales and rhonchi   CV: S1-S2 regular, no murmurs, no rub, no gallop   Abdomen: Soft, non-tender; no masses , no abnormal abdominal sounds   Extremities: No deformity , normal color , no peripheral edema   Skin: Normal temperature, turgor and texture; no rash, ulcers        Cardiographics  Telemetry:           ECG:           Echocardiogram:   Summary   The ejection fraction biplane was calculated at 42 %.   Hypokinetic motion of the apical inferior wall noted in the left ventricle.   Moderate concentric left ventricular hypertrophy observed.   The left atrium is mildly dilated.   There is mild right atrial enlargement.   Moderate aortic stenosis is present.   Mild mitral regurgitation is present.   Mild tricuspid regurgitation.  Lab Review   Results from last 7 days   Lab Units 08/08/20  0427   TROPONIN T ng/mL 5.620*     Results from last 7 days   Lab Units 08/08/20  0427   MAGNESIUM mg/dL 2.1     Results from last 7 days   Lab Units 08/12/20  0540   SODIUM mmol/L 138   POTASSIUM mmol/L 4.9   BUN mg/dL 48*   CREATININE mg/dL 4.38*   CALCIUM mg/dL 8.1*        Results from last 7 days   Lab Units 08/12/20  0540 08/11/20  1554 08/10/20  1319   WBC 10*3/mm3 7.87 6.67 5.11   HEMOGLOBIN g/dL 7.6* 6.5* 7.3*   HEMATOCRIT % 24.0* 20.0* 23.3*   PLATELETS 10*3/mm3 117* 109*  107* 106*        The following medical decision was discussed in detail with Dr. Kuhn    Assessment:  1. Non-Q wave MI  2. Dementia      Plan:  Blood pressure labile. SR on monitor. Hgb 7.6, defer to Im. He has had a Q wave MI and he will be treated conservatively.  We will sign off at this time.        )8/12/2020  AMADA Stone     Patient personally interviewed and above subjective  "findings personally confirmed during a face to face contact with patient today  All findings of physical examination confirmed  All pertinent and performed labs, cardiac procedures ,  radiographs of the last 24 hours personally reviewed  Impression and plans discussed/elaborated and implemented jointly as described above     Sherman Kuhn MD                  [facility] Dragon/Transcription:   \"Dictated utilizing Dragon dictation\".     "

## 2020-08-12 NOTE — PLAN OF CARE
Patient has been restless for much of the day, and will go from bed to chair frequently through shift.  Hgb this am after transfusion was 7.6.  Has had 3 bm's today, stool is dark and formed.  Sinus to sinus tach on monitor with bbb.  On 2 L of O2 when he will leave canula on with % oxygen saturation.  Nephrology saw today and order two units of PRBC to be given with dialysis tomorrow.  New IV access established.  Has been mildly demanding and inappropriate today.  Has been easily directed.  Alert to self and at times place.  ABC's intact.  VSS.  Will continue to monitor.   Problem: Patient Care Overview  Goal: Plan of Care Review  Outcome: Ongoing (interventions implemented as appropriate)  Flowsheets (Taken 8/12/2020 6005)  Plan of Care Reviewed With: patient  Goal: Individualization and Mutuality  Outcome: Ongoing (interventions implemented as appropriate)  Goal: Discharge Needs Assessment  Outcome: Ongoing (interventions implemented as appropriate)  Goal: Interprofessional Rounds/Family Conf  Outcome: Ongoing (interventions implemented as appropriate)     Problem: Fall Risk (Adult)  Goal: Absence of Fall  Outcome: Ongoing (interventions implemented as appropriate)     Problem: Skin Injury Risk (Adult)  Goal: Skin Health and Integrity  Outcome: Ongoing (interventions implemented as appropriate)     Problem: Suicide Risk (Adult)  Goal: Strength-Based Wellness/Recovery  Outcome: Ongoing (interventions implemented as appropriate)  Goal: Physical Safety  Outcome: Ongoing (interventions implemented as appropriate)     Problem: Emotion/Temperament Impairment (Disruptive Behavior) (Adult)  Goal: Improved Emotion/Temperament/Mood Symptoms (Disruptive Behavior)  Outcome: Ongoing (interventions implemented as appropriate)     Problem: Behavioral Regulation Impairment (Disruptive Behavior) (Adult)  Goal: Improved Impulse/Aggression Control (Disruptive Behavior)  Outcome: Ongoing (interventions implemented as  appropriate)     Problem: Sleep Impairment (Disruptive Behavior) (Adult)  Goal: Improved Sleep Hygiene (Disruptive Behavior)  Outcome: Ongoing (interventions implemented as appropriate)

## 2020-08-12 NOTE — PROGRESS NOTES
"   LOS: 11 days    Patient Care Team:  Matilde Hi MD as PCP - General (Internal Medicine)    Chief Complaint:    Chief Complaint   Patient presents with   • Altered Mental Status   • Hypotension     Follow-up end-stage renal  Subjective     Interval History:   Sitting up in chair. Eating Peanut butter and aundrea crackers.  Says he did not get lunch.  Denies pain.  Denies SOA.    Bowels moving.  One unit PRBC last night .    Review of Systems:   As noted    Objective     Vital Signs  Temp:  [97.3 °F (36.3 °C)-98.6 °F (37 °C)] 98.2 °F (36.8 °C)  Heart Rate:  [64-94] 94  Resp:  [16-18] 16  BP: ()/(52-68) 95/55    Flowsheet Rows      First Filed Value   Admission Height  170.2 cm (67\") Documented at 08/01/2020 1247   Admission Weight  71.7 kg (158 lb 1.6 oz) Documented at 08/01/2020 1210          I/O this shift:  In: 720 [P.O.:720]  Out: -   I/O last 3 completed shifts:  In: 1230 [P.O.:480; I.V.:200; Blood:550]  Out: 2400 [Other:2400]    Intake/Output Summary (Last 24 hours) at 8/12/2020 1438  Last data filed at 8/12/2020 0856  Gross per 24 hour   Intake 1470 ml   Output --   Net 1470 ml       Physical Exam:  General Appearance: Sitting up in chair.  Calm, pleasant.  Skin: warm and dry  HEENT: Pupils round reactive to light, nonicteric sclerae  Neck: No JVD.  Lungs: Clear to auscultation, no wheezing .  Heart: RRR, normal S1 and S2, no S3, no rub  Abdomen: soft, nontender,+ bs.  Extremities:RUE AVF patent . 1+ lower ext edema.           Results Review:    Results from last 7 days   Lab Units 08/12/20  0540 08/11/20  1555 08/10/20  1319   SODIUM mmol/L 138 139 138   POTASSIUM mmol/L 4.9 3.9 4.7   CHLORIDE mmol/L 101 100 98   CO2 mmol/L 30.7* 26.3 27.1   BUN mg/dL 48* 34* 60*   CREATININE mg/dL 4.38* 3.39* 6.04*   CALCIUM mg/dL 8.1* 8.1* 8.0*   GLUCOSE mg/dL 211* 156* 207*       Estimated Creatinine Clearance: 15.8 mL/min (A) (by C-G formula based on SCr of 4.38 mg/dL (H)).    Results from last 7 days   Lab " Units 08/10/20  1319 08/09/20  0447 08/08/20  0427   MAGNESIUM mg/dL  --   --  2.1   PHOSPHORUS mg/dL 3.7 4.6* 4.8*             Results from last 7 days   Lab Units 08/12/20  0540 08/11/20  1554 08/10/20  1319 08/09/20  0447 08/08/20  0427   WBC 10*3/mm3 7.87 6.67 5.11 5.75 6.04   HEMOGLOBIN g/dL 7.6* 6.5* 7.3* 7.9* 8.5*   PLATELETS 10*3/mm3 117* 109*  107* 106* 99* 97*               Imaging Results (Last 24 Hours)     ** No results found for the last 24 hours. **          aspirin 81 mg Oral Daily   bisacodyl 10 mg Rectal Daily   folic acid 1 mg Oral Daily   insulin lispro 0-7 Units Subcutaneous TID AC   [START ON 8/13/2020] OLANZapine zydis 10 mg Oral Daily   OLANZapine zydis 20 mg Oral Nightly   sodium chloride 10 mL Intravenous Q12H          Medication Review:   Current Facility-Administered Medications   Medication Dose Route Frequency Provider Last Rate Last Dose   • acetaminophen (TYLENOL) tablet 650 mg  650 mg Oral Q4H PRN Rainer Stevenson MD   650 mg at 08/11/20 0840    Or   • acetaminophen (TYLENOL) 160 MG/5ML solution 650 mg  650 mg Oral Q4H PRN Rainer Stevenson MD        Or   • acetaminophen (TYLENOL) suppository 650 mg  650 mg Rectal Q4H PRN Rainer Stevenson MD       • albumin human 25 % IV SOLN 25 g  25 g Intravenous 4x Daily PRN Connor Vazquez MD       • aspirin chewable tablet 81 mg  81 mg Oral Daily Janet Garrett MD   81 mg at 08/12/20 0912   • bisacodyl (DULCOLAX) suppository 10 mg  10 mg Rectal Daily Hung Rosenberg MD   10 mg at 08/12/20 0912   • dextrose (D50W) 25 g/ 50mL Intravenous Solution 25 g  25 g Intravenous Q15 Min PRN Vince Otto MD       • dextrose (GLUTOSE) oral gel 15 g  15 g Oral Q15 Min PRN Vince Otto MD       • folic acid (FOLVITE) tablet 1 mg  1 mg Oral Daily Luis F Zapata MD       • glucagon (human recombinant) (GLUCAGEN DIAGNOSTIC) injection 1 mg  1 mg Subcutaneous Q15 Min PRN Vince Otto MD       • insulin lispro (humaLOG) injection 0-7 Units  0-7  Units Subcutaneous TID AC Vince Otto MD   2 Units at 08/12/20 1135   • nitroglycerin (NITROSTAT) ointment 0.5 inch  0.5 inch Topical Q6H PRN Janet Garrett MD       • nitroglycerin (NITROSTAT) SL tablet 0.4 mg  0.4 mg Sublingual Q5 Min PRN Rainer Stevenson MD   0.4 mg at 08/08/20 0447   • [START ON 8/13/2020] OLANZapine zydis (ZyPREXA) disintegrating tablet 10 mg  10 mg Oral Daily Luis F Myers III, MD       • OLANZapine zydis (ZyPREXA) disintegrating tablet 20 mg  20 mg Oral Nightly Luis F Myers III, MD       • ondansetron (ZOFRAN) injection 4 mg  4 mg Intravenous Q6H PRN Rainer Stevenson MD   4 mg at 08/09/20 0644   • sodium chloride 0.9 % flush 10 mL  10 mL Intravenous PRN Rainer Stevenson MD       • sodium chloride 0.9 % flush 10 mL  10 mL Intravenous Q12H Rainer Stevenson MD   10 mL at 08/12/20 0900   • sodium chloride 0.9 % flush 10 mL  10 mL Intravenous PRN Rainer Stevenson MD           Assessment/Plan   1.  ESRD.  Dialysis tomorrow.  Remove volume.      2.  Altered mental state.  Psych note reviewed. ZYprexa increased.   3.  Anemia and thrombocytopenia.  Hg better, but still low despite one unit PRBC.  Subq procrit 20K yesterday.  Heme positive. Agree with Dr. Rosenberg that endoscopy would be high risk with recent NSTEMI, lack of cooperation.   4.  Diabetes mellitus type 2  5.  Pyuria.  No growth on urine culture.   6. TCP, chronic.    Plan:  1. Dialysis tomorrow.  If patient agitated in am, will need to dialyze in his room rather than the dialysis unit as he is very disruptive to the other patients when agitated .  2. PRBC with dialysis tomorrow. 2 units.       Jasmin Dooley MD  08/12/20  14:38

## 2020-08-12 NOTE — PROGRESS NOTES
Gastroenterology   Inpatient Progress Note    Reason for Follow Up: Anemia    Subjective     Interval History:   Reports having 1 darker colored stool.  Patient continues to report constipation and the need to strain to have a BM.  Reports mild abdominal tenderness.  Tolerating diet.  Denies any nausea or vomiting.    Current Facility-Administered Medications:   •  acetaminophen (TYLENOL) tablet 650 mg, 650 mg, Oral, Q4H PRN, 650 mg at 08/11/20 0840 **OR** acetaminophen (TYLENOL) 160 MG/5ML solution 650 mg, 650 mg, Oral, Q4H PRN **OR** acetaminophen (TYLENOL) suppository 650 mg, 650 mg, Rectal, Q4H PRN, Rainer Stevenson MD  •  albumin human 25 % IV SOLN 25 g, 25 g, Intravenous, 4x Daily PRN, Connor Vazquez MD  •  aspirin chewable tablet 81 mg, 81 mg, Oral, Daily, Janet Garrett MD, 81 mg at 08/12/20 0912  •  bisacodyl (DULCOLAX) suppository 10 mg, 10 mg, Rectal, Daily, Hung Rosenberg MD, 10 mg at 08/12/20 0912  •  dextrose (D50W) 25 g/ 50mL Intravenous Solution 25 g, 25 g, Intravenous, Q15 Min PRN, Vince Otto MD  •  dextrose (GLUTOSE) oral gel 15 g, 15 g, Oral, Q15 Min PRN, Vince Otto MD  •  folic acid (FOLVITE) tablet 1 mg, 1 mg, Oral, Daily, Luis F Zapata MD  •  glucagon (human recombinant) (GLUCAGEN DIAGNOSTIC) injection 1 mg, 1 mg, Subcutaneous, Q15 Min PRN, Vince Otto MD  •  insulin lispro (humaLOG) injection 0-7 Units, 0-7 Units, Subcutaneous, TID AC, Vince Otto MD, 2 Units at 08/12/20 1135  •  nitroglycerin (NITROSTAT) ointment 0.5 inch, 0.5 inch, Topical, Q6H PRN, Janet Garrett MD  •  nitroglycerin (NITROSTAT) SL tablet 0.4 mg, 0.4 mg, Sublingual, Q5 Min PRN, Graham, Jawed, MD, 0.4 mg at 08/08/20 0447  •  [START ON 8/13/2020] OLANZapine zydis (ZyPREXA) disintegrating tablet 10 mg, 10 mg, Oral, Daily, Luis F Myers III, MD  •  OLANZapine zydis (ZyPREXA) disintegrating tablet 20 mg, 20 mg, Oral, Nightly, Luis F Myers III, MD  •   ondansetron (ZOFRAN) injection 4 mg, 4 mg, Intravenous, Q6H PRN, Rainer Stevenson MD, 4 mg at 08/09/20 0644  •  sodium chloride 0.9 % flush 10 mL, 10 mL, Intravenous, PRN, Rainer Stevenson MD  •  sodium chloride 0.9 % flush 10 mL, 10 mL, Intravenous, Q12H, Rainer Stevenson MD, 10 mL at 08/12/20 0900  •  sodium chloride 0.9 % flush 10 mL, 10 mL, Intravenous, PRN, Rainer Stevenson MD  Review of Systems:    All systems were reviewed and negative except for:  Gastrointestinal: positive for  constipation    Objective     Vital Signs  Temp:  [97.3 °F (36.3 °C)-98.6 °F (37 °C)] 98.2 °F (36.8 °C)  Heart Rate:  [64-94] 94  Resp:  [16-18] 16  BP: ()/(52-68) 95/55  Body mass index is 25.65 kg/m².    Intake/Output Summary (Last 24 hours) at 8/12/2020 1443  Last data filed at 8/12/2020 0856  Gross per 24 hour   Intake 1470 ml   Output --   Net 1470 ml     I/O this shift:  In: 720 [P.O.:720]  Out: -      Physical Exam:   General: patient awake, alert and cooperative   Eyes: no scleral icterus   Skin: warm and dry, not jaundiced   Abdomen: soft, nontender, nondistended; normal bowel sounds, no masses palpated, no periumbical lymphadenopathy   Psychiatric: Appropriate affect and behavior     Results Review:     I reviewed the patient's new clinical results.    Results from last 7 days   Lab Units 08/12/20  0540 08/11/20  1554 08/10/20  1319   WBC 10*3/mm3 7.87 6.67 5.11   HEMOGLOBIN g/dL 7.6* 6.5* 7.3*   HEMATOCRIT % 24.0* 20.0* 23.3*   PLATELETS 10*3/mm3 117* 109*  107* 106*     Results from last 7 days   Lab Units 08/12/20  0540 08/11/20  1555 08/10/20  1319   SODIUM mmol/L 138 139 138   POTASSIUM mmol/L 4.9 3.9 4.7   CHLORIDE mmol/L 101 100 98   CO2 mmol/L 30.7* 26.3 27.1   BUN mg/dL 48* 34* 60*   CREATININE mg/dL 4.38* 3.39* 6.04*   CALCIUM mg/dL 8.1* 8.1* 8.0*   GLUCOSE mg/dL 211* 156* 207*         Lab Results   Lab Value Date/Time    LIPASE 23 04/03/2020 2355    LIPASE 85 12/19/2019 0925    LIPASE 140 09/01/2019 1645    LIPASE 84  05/09/2019 1317    LIPASE 147 02/25/2019 0558       Radiology:  MRI Brain Without Contrast   Final Result       No acute infarct. No findings to suggest hemorrhage at the midbrain,   although the exam is somewhat limited by motion artifact; the CT density   of concern on the earlier CT may represent vascular anomaly. Follow-up   evaluation with enhanced MRI may be helpful when/if possible, and close   follow-up CT could be obtained to ensure stability of the CT finding of   concern.       This report was finalized on 8/1/2020 3:43 PM by Dr. Jayjay Corbett M.D.          XR Chest 1 View   Final Result   Small likely atelectasis or scarring. Follow-up as   clinically indicated.       This report was finalized on 8/1/2020 1:34 PM by Dr. Jayjay Corbett M.D.          CT Cerebral Perfusion With & Without Contrast   Final Result           1. No perfusion abnormality to suggest completed or threatened acute   infarct.   2. No arterial cutoff. Narrowing at the bilateral ICA cavernous   segments, estimated at 65% on the right, 75% on the left.   3. A 3 to 4 mm hyperdense focus at the mid brain remains indeterminate,   could be a small focus of hemorrhage or vascular abnormality. Close   follow-up/further evaluation recommended.   4. Possible mucosal lesion in the trachea versus lobulated mucus.           The findings were discussed by telephone with Dr. Sanchez at 1235,   1248, 08/01/2020       This report was finalized on 8/1/2020 12:56 PM by Dr. Jayjay Corbett M.D.          CT Angiogram Neck   Final Result           1. No perfusion abnormality to suggest completed or threatened acute   infarct.   2. No arterial cutoff. Narrowing at the bilateral ICA cavernous   segments, estimated at 65% on the right, 75% on the left.   3. A 3 to 4 mm hyperdense focus at the mid brain remains indeterminate,   could be a small focus of hemorrhage or vascular abnormality. Close   follow-up/further evaluation recommended.      4. Possible mucosal lesion in the trachea versus lobulated mucus.           The findings were discussed by telephone with Dr. Sanchez at 1235,   1248, 08/01/2020       This report was finalized on 8/1/2020 12:56 PM by Dr. Jayjay Corbett M.D.          CT Angiogram Head   Final Result           1. No perfusion abnormality to suggest completed or threatened acute   infarct.   2. No arterial cutoff. Narrowing at the bilateral ICA cavernous   segments, estimated at 65% on the right, 75% on the left.   3. A 3 to 4 mm hyperdense focus at the mid brain remains indeterminate,   could be a small focus of hemorrhage or vascular abnormality. Close   follow-up/further evaluation recommended.   4. Possible mucosal lesion in the trachea versus lobulated mucus.           The findings were discussed by telephone with Dr. Sanchez at 1235,   1248, 08/01/2020       This report was finalized on 8/1/2020 12:56 PM by Dr. Jayjay Corbett M.D.              Assessment/Plan     Patient Active Problem List   Diagnosis   • Acute metabolic encephalopathy   • Unresponsive   • COPD (chronic obstructive pulmonary disease) (CMS/[facility], located within [facility])   • Diabetes mellitus (CMS/HCC)   • Hypertension   • ESRD (end stage renal disease) (CMS/LTAC, located within [facility])   • UTI (urinary tract infection)   • Elevated troponin   • Anemia due to chronic kidney disease       Assessment:  1. Anemia with heme positive stool.  H/H today at 7.6/24%.  Patient reports one darker colored stool yesterday-unsure if blood was present or if stool was just dark brown.  2. Metabolic encephalopathy.  This seems to be improved as patient was very cooperative and oriented today.  3. Chest pain.  Cardiology following.  4. Type 2 diabetes mellitus, stable.  5. COPD, which appears to be stable today patient does not demonstrate any shortness of air.  6. ESRD.  Plan for dialysis tomorrow with 2 units PRBCs per renal.  7. Constipation.  Patient has agreed to utilize MiraLAX in conjunction with his  suppository.      Plan:  · The patient is at high risk for procedures requiring sedation secondary to his multiple comorbidities including CHF with a recent non-Q MI, COPD, as well as end-stage renal disease.  We will continue to monitor his H/H.  Ultimately, we feel that endoscopic evaluation via EGD and colonoscopy is indicated given his current issue with anemia and heme positive stool; however, this may be difficult given his multiple comorbidities.  Clearance would need to be obtained from cardiac, renal, and pulmonary specialties before proceeding.  · For constipation, MiraLAX has been added which will likely help with management of his diffuse abdominal tenderness as well.   · We will follow H&H and assess his CBC in the am to determine stability.  If his H&H continues to drop, may need to discuss the need for EGD and colonoscopy with the different medical disciplines involved in his care for further evaluation of GI blood loss. But agree that patient would be at high risk.   · We will continue to monitor.    I discussed the patients findings and my recommendations with patient and nursing staff.    AMADA Moran M.D.  [facility] ([facility] [place] [address]  Office: [phone]

## 2020-08-12 NOTE — PROGRESS NOTES
Subjective   REASON FOR CONSULTATION: Anemia and thrombocytopenia on a patient with end-stage renal disease on hemodialysis    HISTORY OF PRESENT ILLNESS:   The patient is a 75 y.o. year old male who is here for an opinion about the above issue.  He has end-stage renal disease and is on chronic hemodialysis.  He has been on oral iron supplementation in the past but his hemoglobin is lower on this admission with a hemoglobin of 7.3 g/dL.  Platelets are also low around 100,000.  White cells are normal.     Stools were heme positive.  He is on aspirin currently but not on any other blood thinner medications.  He was in dialysis most of the day and his dialysis nurse reports that they were able to remove excess fluid.  He is not currently receiving any AMBIKA with his inpatient dialysis but according to Dr. Dooley he was receiving Procrit 3 times a week IV with his dialysis as an outpatient.    8/12/2020  Hemoglobin improved to 7.6 from 6.5 g/dL after 1 unit packed red blood cells.  Platelets also improved to 117,000.  History of Present Illness      Past Medical History, Past Surgical History, Social History, Family History have been reviewed and are without significant changes except as mentioned.    Review of Systems   Constitutional: Positive for fatigue. Negative for activity change, chills and fever.   HENT: Negative for mouth sores, trouble swallowing and voice change.    Eyes: Negative for pain and visual disturbance.   Respiratory: Negative for cough, shortness of breath and wheezing.    Cardiovascular: Negative for chest pain and palpitations.   Gastrointestinal: Negative for abdominal pain, constipation, diarrhea, nausea and vomiting.   Genitourinary: Negative for difficulty urinating, frequency and urgency.   Musculoskeletal: Negative for arthralgias and joint swelling.   Skin: Positive for pallor. Negative for rash.   Neurological: Positive for weakness. Negative for dizziness, seizures and headaches.    Hematological: Negative for adenopathy. Bruises/bleeds easily.   Psychiatric/Behavioral: Positive for agitation, behavioral problems and dysphoric mood. Negative for confusion. The patient is not nervous/anxious.    A comprehensive 14 point review of systems was performed and was negative except as mentioned.    Medications:  The current medication list was reviewed in the EMR    ALLERGIES:  No Known Allergies    Objective      Vitals:    08/11/20 2209 08/12/20 0009 08/12/20 0512 08/12/20 0810   BP: 121/68 106/52 109/62 94/55   BP Location:  Left arm Left arm Left arm   Patient Position:  Lying Lying Sitting   Pulse: 64 66     Resp: 16 18 16 16   Temp: 98.6 °F (37 °C) 98.2 °F (36.8 °C) 97.7 °F (36.5 °C) 97.9 °F (36.6 °C)   TempSrc:  Oral Oral Oral   SpO2:  96%     Weight:       Height:         No flowsheet data found.    Physical Exam    Constitutional: He is oriented to person, place, and time. He appears well-developed and well-nourished. No distress.   HENT:   Head: Normocephalic.   Eyes: Pupils are equal, round, and reactive to light. Conjunctivae and EOM are normal. No scleral icterus.   Neck: Normal range of motion. Neck supple. No JVD present. No thyromegaly present.   Cardiovascular: Normal rate and regular rhythm. Exam reveals no gallop and no friction rub.   No murmur heard.  Pulmonary/Chest: Effort normal and breath sounds normal. He has no wheezes. He has no rales.   Abdominal: Soft. He exhibits no distension and no mass. There is no tenderness.   Musculoskeletal: Normal range of motion. He exhibits edema. He exhibits no deformity.   Dialysis shunt in the right upper extremity   Lymphadenopathy:     He has no cervical adenopathy.   Neurological: He is alert and oriented to person, place, and time. He has normal reflexes. No cranial nerve deficit.   Skin: Skin is warm and dry. No rash noted. No erythema. There is pallor.   Psychiatric: Judgment normal.   Irascible     RECENT LABS:  Hematology WBC   Date  Value Ref Range Status   08/12/2020 7.87 3.40 - 10.80 10*3/mm3 Final     RBC   Date Value Ref Range Status   08/12/2020 2.55 (L) 4.14 - 5.80 10*6/mm3 Final     Hemoglobin   Date Value Ref Range Status   08/12/2020 7.6 (L) 13.0 - 17.7 g/dL Final     Hematocrit   Date Value Ref Range Status   08/12/2020 24.0 (L) 37.5 - 51.0 % Final     Platelets   Date Value Ref Range Status   08/12/2020 117 (L) 140 - 450 10*3/mm3 Final            Lab Results   Component Value Date    IRON 50 (L) 08/11/2020    TIBC 234 (L) 08/11/2020    FERRITIN 372.00 08/11/2020     Lab Results   Component Value Date    OTDKLNCU07 1,143 (H) 08/11/2020     Lab Results   Component Value Date    FOLATE 3.40 (L) 08/11/2020     Immature Reticulocyte Fraction  3.0 - 15.8 % 37.4High     Reticulocyte %  0.70 - 1.90 % 3.16High     Reticulocyte Hgb  29.8 - 36.1 pg 27.7Low       LDH  135 - 225 U/L 254High       Haptoglobin  30 - 200 mg/dL 140        Assessment/Plan   1.  Anemia which is almost certainly multifactorial.  The patient has heme positive stool and has end-stage renal disease on hemodialysis.  We will need to check for any deficiency state particularly iron in the setting.  He has been receiving Procrit IV 3 times a week with his outpatient dialysis.  He received 1 unit packed red blood cells yesterday and we plan to give 1 more unit packed red blood cells with his next dialysis.  His folate level was mildly low and we will order oral folic acid supplementation.  2.  Thrombocytopenia.  This is chronic and has been stable and in a safe range around ,000 since December 2019.  IPF normal.     Recommendations  1.  Transfuse 1units packed red blood cells with next dialysis.  Patient received erythropoietin injection 20,000 units subcu 8/11/2020.  2.   Folic acid supplement 1 mg p.o. daily  3.  We will defer to the admitting team whether or not patient requires GI evaluation for heme positive stool.  4.    Not much for us to add at this time.  We  will sign off for now.  Please call us again if needed.                  8/12/2020      CC:

## 2020-08-12 NOTE — CONSULTS
The patient is seen in follow-up.  Events of last evening are noted.  The patient remains verbally abusive towards staff.  When seen today the patient is in a chair with no restraint devices in place.  He is calm and cooperative, stating a wish to go to live with his sister following discharge.    As I have previously noted, the patient's behavioral issues seem to be related to a difficult personality style coupled with his stated wish to remain in the hospital and avoid returning  to [facility].    Unfortunately, the psychiatric pharmacopeia does not include a pill capable of changing a patient's personality style.  I will, however, increase the patient's nighttime dose of Zydis to a very aggressive dose of 20 mg coupled with his morning dose of 10 mg.  This represents the maximum daily recommended dose of Zyprexa, and will likely lead to some degree of somnolence, which should be preferable to the patient's previous difficult personality.

## 2020-08-12 NOTE — PLAN OF CARE
Problem: Patient Care Overview  Goal: Plan of Care Review  Outcome: Ongoing (interventions implemented as appropriate)  Flowsheets (Taken 8/12/2020 0528)  Outcome Summary: VSS, pt oriented to self only. very agitated and aggressive through the night. verbally aggressive towards staff. Frequently yelling out and attempts to get out of bed. bed alarm activated. no falls.  on 2LNC with O2 sats %. refusing to keep heart monitor on. 1Unit blood finsished infusing. H/H to be drawn this AM. will await results of morning labs. Possible DC back to [facility] pending results. will CTM.

## 2020-08-12 NOTE — PROGRESS NOTES
"    Name: Lucien Avalos ADMIT: 2020   : 1945  PCP: Matilde Hi MD    MRN: [id number] LOS: 11 days   AGE/SEX: 75 y.o. male  ROOM: [facility]     Subjective   Subjective   Patient appears generally comfortable & in no apparent distress.  Talkative & follows simple commands.  States \"hungry\".  Reportedly willing to work with team to evaluate anemia.    Review of Systems   Constitutional: Negative for chills and fever.   HENT: Negative for congestion and rhinorrhea.    Respiratory: Negative for cough and shortness of breath.    Cardiovascular: Negative for chest pain and leg swelling.   Gastrointestinal: Positive for abdominal pain (generalized 'tenderness'). Negative for constipation, diarrhea, nausea and vomiting.   Endocrine: Negative for polydipsia, polyphagia and polyuria.   Genitourinary: Positive for difficulty urinating (chronic anuria). Negative for flank pain.   Musculoskeletal: Negative for back pain and myalgias.   Skin: Negative for rash and wound.   Neurological: Positive for weakness (generalized). Negative for dizziness.   Psychiatric/Behavioral: Negative for confusion and hallucinations.        Objective   Objective   Vital Signs  Temp:  [97.3 °F (36.3 °C)-98.6 °F (37 °C)] 97.9 °F (36.6 °C)  Heart Rate:  [64-90] 66  Resp:  [16-18] 16  BP: ()/(52-68) 94/55  SpO2:  [96 %-99 %] 96 %  on  Flow (L/min):  [2] 2;   Device (Oxygen Therapy): nasal cannula  Body mass index is 25.65 kg/m².     Physical Exam   Constitutional: He is oriented to person, place, and time. No distress.   Generally weak & non-toxic   HENT:   Head: Normocephalic and atraumatic.   Eyes: Pupils are equal, round, and reactive to light. EOM are normal.   Neck: Normal range of motion. Neck supple.   Cardiovascular: Normal rate and normal heart sounds.   Pulmonary/Chest: Effort normal and breath sounds normal. No respiratory distress.   Abdominal: Soft. Bowel sounds are normal. There is tenderness (generalized).   "   Umbilical hernia, reducible   Musculoskeletal: He exhibits no edema (trace BLE) or deformity.   Neurological: He is alert and oriented to person, place, and time. No cranial nerve deficit or sensory deficit. He exhibits normal muscle tone. Coordination normal.   Answering all questions appropriately   Skin: Skin is warm and dry. He is not diaphoretic. There is pallor.   Psychiatric: He has a normal mood and affect. His behavior is normal. Judgment and thought content normal.       Results Review:       I reviewed the patient's new clinical results.  Results from last 7 days   Lab Units 08/12/20  0540 08/11/20  1554 08/10/20  1319 08/09/20  0447   WBC 10*3/mm3 7.87 6.67 5.11 5.75   HEMOGLOBIN g/dL 7.6* 6.5* 7.3* 7.9*   PLATELETS 10*3/mm3 117* 109*  107* 106* 99*     Results from last 7 days   Lab Units 08/12/20  0540 08/11/20  1555 08/10/20  1319 08/09/20  0447   SODIUM mmol/L 138 139 138 136   POTASSIUM mmol/L 4.9 3.9 4.7 4.6   CHLORIDE mmol/L 101 100 98 100   CO2 mmol/L 30.7* 26.3 27.1 29.3*   BUN mg/dL 48* 34* 60* 28*   CREATININE mg/dL 4.38* 3.39* 6.04* 3.97*   GLUCOSE mg/dL 211* 156* 207* 151*   Estimated Creatinine Clearance: 15.8 mL/min (A) (by C-G formula based on SCr of 4.38 mg/dL (H)).  Results from last 7 days   Lab Units 08/10/20  1319 08/09/20  0447 08/08/20  0427 08/06/20  0604   ALBUMIN g/dL 3.10* 2.80* 2.60* 2.60*     Results from last 7 days   Lab Units 08/12/20 0540 08/11/20  1555 08/10/20  1319 08/09/20 0447 08/08/20  0427 08/06/20  0604   CALCIUM mg/dL 8.1* 8.1* 8.0* 8.1* 8.0* 7.9*   ALBUMIN g/dL  --   --  3.10* 2.80* 2.60* 2.60*   MAGNESIUM mg/dL  --   --   --   --  2.1  --    PHOSPHORUS mg/dL  --   --  3.7 4.6* 4.8* 6.8*       COVID19   Date Value Ref Range Status   08/01/2020 Not Detected Not Detected - Ref. Range Final     Glucose   Date/Time Value Ref Range Status   08/12/2020 1116 198 (H) 70 - 130 mg/dL Final   08/12/2020 0632 189 (H) 70 - 130 mg/dL Final   08/11/2020 2050 219 (H) 70 -  130 mg/dL Final   08/11/2020 1612 181 (H) 70 - 130 mg/dL Final   08/11/2020 0613 245 (H) 70 - 130 mg/dL Final   08/10/2020 2016 250 (H) 70 - 130 mg/dL Final   08/10/2020 0615 204 (H) 70 - 130 mg/dL Final       MRI Brain Without Contrast  Narrative: MRI BRAIN WO CONTRAST-     INDICATIONS: Altered mental status, possible hemorrhage     TECHNIQUE: Multiplanar multisequence noncontrast magnetic resonance  imaging of the brain.     COMPARISON: CT from 08/01/2020     FINDINGS:     Several sequences are significantly degraded by motion artifact,  limiting the assessment.     The diffusion-weighted images show no restricted diffusion to suggest  acute infarct.     The midline structures appear unremarkable.     The brain parenchyma shows moderate periventricular white matter T2  FLAIR signal hyperintensities suggesting chronic small vessel ischemic  change in a patient this age.     Flow voids in the major arteries at the base of the brain appear  unremarkable. Left vertebral artery is dominant.     The paranasal sinuses, mastoid air cells, and orbits appear  unremarkable.     Impression:    No acute infarct. No findings to suggest hemorrhage at the midbrain,  although the exam is somewhat limited by motion artifact; the CT density  of concern on the earlier CT may represent vascular anomaly. Follow-up  evaluation with enhanced MRI may be helpful when/if possible, and close  follow-up CT could be obtained to ensure stability of the CT finding of  concern.     This report was finalized on 8/1/2020 3:43 PM by Dr. Jayjay Corbett M.D.     XR Chest 1 View  Narrative: XR CHEST 1 VW-     HISTORY: Male who is 75 years-old,  stroke     TECHNIQUE: Frontal view of the chest     COMPARISON: None available     FINDINGS: Heart is mildly enlarged. Mild prominence of vascular and  interstitial markings. Linear likely atelectasis or scarring the left  midlung. No pleural effusion or pneumothorax. No acute osseous process.        Impression: Small likely atelectasis or scarring. Follow-up as  clinically indicated.     This report was finalized on 8/1/2020 1:34 PM by Dr. Jayjay Corbett M.D.     CT Angiogram Head  Narrative: CT ANGIOGRAM HEAD AND NECK AND CT PERFUSION STUDY     CLINICAL HISTORY: Altered mental status.     Radiation dose reduction techniques were utilized, including automated  exposure control and exposure modulation based on body size. CT scan of  the head was obtained with 3 mm axial images without the use  of IV contrast. The patient underwent a CT  perfusion study with a dynamic bolus of IV contrast. Standard perfusion  maps were constructed. The patient then underwent a CT angiogram of the  head and neck with 1 mm axial images following the administration of IV  contrast. Sagittal, coronal, and 3-dimensional reconstructed images were  obtained.  Percent stenosis was assessed in accordance with NASCET  criteria.     FINDINGS:     NONCONTRAST HEAD CT: On the noncontrast head CT, a 3-4 mm hyperdense  focus at the midbrain, for example axial image 17 could be a small focus  of blood or vascular anomaly, further evaluation with MRI may be  helpful, close interval follow-up can characterize change. Moderate  periventricular hypodensities suggest chronic small vessel ischemic  change in a patient this age. Arterial calcifications are seen at the  base the brain.     No enhancing lesions of brain are noted following contrast material  administration.        CT PERFUSION STUDY:  No CBF (under 30%) deficit or perfusion abnormality  is demonstrated where the T MAX is greater than 6 seconds. The  calculated mismatch volume was 0 cc.     CTA HEAD and neck: The CT angiogram of the head and neck demonstrates  calcifications in the bilateral cervical and intracranial carotid  arteries, with estimated 65% narrowing at the cavernous segment of the  right ICA, 75% narrowing at the origin of the cavernous segment of the  left ICA  otherwise without high-grade stenosis (0-49% stenosis). About  20% narrowing at the origin of the left cervical ICA. Otherwise, no  hemodynamically significant focal stenosis, aneurysm, or dissection in  the cervical carotid or vertebral arteries, or in the arteries at the  base of the brain. The left P1 segment is diminutive, the left PCA being  at least partially supplied by the anterior circulation. The lower  aspect of the left vertebral artery is obscured by attenuation artifact  from densely opacified left subclavian vein, precluding its assessment.     Lobulated mucous versus mucosal lesion measuring 1.7 cm in the posterior  right aspect of the trachea at the level of the thoracic inlet, axial  image 65, follow-up or direct visualization recommended.     Cervical spondyloarthropathy is present with uncovertebral joint and  facet hypertrophy result in bilateral neuroforaminal narrowing, more  prominent on the right at C4/5, C5/6, and on the left at C3/4, C5/6.           Impression:       1. No perfusion abnormality to suggest completed or threatened acute  infarct.  2. No arterial cutoff. Narrowing at the bilateral ICA cavernous  segments, estimated at 65% on the right, 75% on the left.  3. A 3 to 4 mm hyperdense focus at the mid brain remains indeterminate,  could be a small focus of hemorrhage or vascular abnormality. Close  follow-up/further evaluation recommended.  4. Possible mucosal lesion in the trachea versus lobulated mucus.        The findings were discussed by telephone with Dr. Sanchez at 1235,  1248, 08/01/2020     This report was finalized on 8/1/2020 12:56 PM by Dr. Jayjay Corebtt M.D.     CT Angiogram Neck  Narrative: CT ANGIOGRAM HEAD AND NECK AND CT PERFUSION STUDY     CLINICAL HISTORY: Altered mental status.     Radiation dose reduction techniques were utilized, including automated  exposure control and exposure modulation based on body size. CT scan of  the head was obtained with 3 mm  axial images without the use  of IV contrast. The patient underwent a CT  perfusion study with a dynamic bolus of IV contrast. Standard perfusion  maps were constructed. The patient then underwent a CT angiogram of the  head and neck with 1 mm axial images following the administration of IV  contrast. Sagittal, coronal, and 3-dimensional reconstructed images were  obtained.  Percent stenosis was assessed in accordance with NASCET  criteria.     FINDINGS:     NONCONTRAST HEAD CT: On the noncontrast head CT, a 3-4 mm hyperdense  focus at the midbrain, for example axial image 17 could be a small focus  of blood or vascular anomaly, further evaluation with MRI may be  helpful, close interval follow-up can characterize change. Moderate  periventricular hypodensities suggest chronic small vessel ischemic  change in a patient this age. Arterial calcifications are seen at the  base the brain.     No enhancing lesions of brain are noted following contrast material  administration.        CT PERFUSION STUDY:  No CBF (under 30%) deficit or perfusion abnormality  is demonstrated where the T MAX is greater than 6 seconds. The  calculated mismatch volume was 0 cc.     CTA HEAD and neck: The CT angiogram of the head and neck demonstrates  calcifications in the bilateral cervical and intracranial carotid  arteries, with estimated 65% narrowing at the cavernous segment of the  right ICA, 75% narrowing at the origin of the cavernous segment of the  left ICA otherwise without high-grade stenosis (0-49% stenosis). About  20% narrowing at the origin of the left cervical ICA. Otherwise, no  hemodynamically significant focal stenosis, aneurysm, or dissection in  the cervical carotid or vertebral arteries, or in the arteries at the  base of the brain. The left P1 segment is diminutive, the left PCA being  at least partially supplied by the anterior circulation. The lower  aspect of the left vertebral artery is obscured by attenuation  artifact  from densely opacified left subclavian vein, precluding its assessment.     Lobulated mucous versus mucosal lesion measuring 1.7 cm in the posterior  right aspect of the trachea at the level of the thoracic inlet, axial  image 65, follow-up or direct visualization recommended.     Cervical spondyloarthropathy is present with uncovertebral joint and  facet hypertrophy result in bilateral neuroforaminal narrowing, more  prominent on the right at C4/5, C5/6, and on the left at C3/4, C5/6.           Impression:       1. No perfusion abnormality to suggest completed or threatened acute  infarct.  2. No arterial cutoff. Narrowing at the bilateral ICA cavernous  segments, estimated at 65% on the right, 75% on the left.  3. A 3 to 4 mm hyperdense focus at the mid brain remains indeterminate,  could be a small focus of hemorrhage or vascular abnormality. Close  follow-up/further evaluation recommended.  4. Possible mucosal lesion in the trachea versus lobulated mucus.        The findings were discussed by telephone with Dr. Sanchez at 1235,  1248, 08/01/2020     This report was finalized on 8/1/2020 12:56 PM by Dr. Jayjay Corbett M.D.     CT Cerebral Perfusion With & Without Contrast  Narrative: CT ANGIOGRAM HEAD AND NECK AND CT PERFUSION STUDY     CLINICAL HISTORY: Altered mental status.     Radiation dose reduction techniques were utilized, including automated  exposure control and exposure modulation based on body size. CT scan of  the head was obtained with 3 mm axial images without the use  of IV contrast. The patient underwent a CT  perfusion study with a dynamic bolus of IV contrast. Standard perfusion  maps were constructed. The patient then underwent a CT angiogram of the  head and neck with 1 mm axial images following the administration of IV  contrast. Sagittal, coronal, and 3-dimensional reconstructed images were  obtained.  Percent stenosis was assessed in accordance with NASCET  criteria.        FINDINGS:     NONCONTRAST HEAD CT: On the noncontrast head CT, a 3-4 mm hyperdense  focus at the midbrain, for example axial image 17 could be a small focus  of blood or vascular anomaly, further evaluation with MRI may be  helpful, close interval follow-up can characterize change. Moderate  periventricular hypodensities suggest chronic small vessel ischemic  change in a patient this age. Arterial calcifications are seen at the  base the brain.     No enhancing lesions of brain are noted following contrast material  administration.        CT PERFUSION STUDY:  No CBF (under 30%) deficit or perfusion abnormality  is demonstrated where the T MAX is greater than 6 seconds. The  calculated mismatch volume was 0 cc.     CTA HEAD and neck: The CT angiogram of the head and neck demonstrates  calcifications in the bilateral cervical and intracranial carotid  arteries, with estimated 65% narrowing at the cavernous segment of the  right ICA, 75% narrowing at the origin of the cavernous segment of the  left ICA otherwise without high-grade stenosis (0-49% stenosis). About  20% narrowing at the origin of the left cervical ICA. Otherwise, no  hemodynamically significant focal stenosis, aneurysm, or dissection in  the cervical carotid or vertebral arteries, or in the arteries at the  base of the brain. The left P1 segment is diminutive, the left PCA being  at least partially supplied by the anterior circulation. The lower  aspect of the left vertebral artery is obscured by attenuation artifact  from densely opacified left subclavian vein, precluding its assessment.     Lobulated mucous versus mucosal lesion measuring 1.7 cm in the posterior  right aspect of the trachea at the level of the thoracic inlet, axial  image 65, follow-up or direct visualization recommended.     Cervical spondyloarthropathy is present with uncovertebral joint and  facet hypertrophy result in bilateral neuroforaminal narrowing, more  prominent on the  right at C4/5, C5/6, and on the left at C3/4, C5/6.           Impression:       1. No perfusion abnormality to suggest completed or threatened acute  infarct.  2. No arterial cutoff. Narrowing at the bilateral ICA cavernous  segments, estimated at 65% on the right, 75% on the left.  3. A 3 to 4 mm hyperdense focus at the mid brain remains indeterminate,  could be a small focus of hemorrhage or vascular abnormality. Close  follow-up/further evaluation recommended.  4. Possible mucosal lesion in the trachea versus lobulated mucus.        The findings were discussed by telephone with Dr. Sanchez at 1235,  1248, 08/01/2020     This report was finalized on 8/1/2020 12:56 PM by Dr. Jayjay Corbett M.D.           aspirin 81 mg Oral Daily   bisacodyl 10 mg Rectal Daily   folic acid 1 mg Oral Daily   insulin lispro 0-7 Units Subcutaneous TID AC   [START ON 8/13/2020] OLANZapine zydis 10 mg Oral Daily   OLANZapine zydis 20 mg Oral Nightly   sodium chloride 10 mL Intravenous Q12H      Diet Regular; Cardiac       Assessment/Plan     Active Hospital Problems    Diagnosis  POA   • **Unresponsive [R41.89]  Unknown   • Acute metabolic encephalopathy [G93.41]  Yes   • COPD (chronic obstructive pulmonary disease) (CMS/[facility]) [J44.9]  Yes   • Diabetes mellitus (CMS/[facility]) [E11.9]  Yes   • Hypertension [I10]  Unknown   • ESRD (end stage renal disease) (CMS/[facility]) [N18.6]  Yes   • UTI (urinary tract infection) [N39.0]  Unknown   • Elevated troponin [R79.89]  Unknown   • Anemia due to chronic kidney disease [N18.9, D63.1]  Unknown      Resolved Hospital Problems   No resolved problems to display.       75 y.o. male admitted with Unresponsive.    ·   Unresponsive.  ?  Selective mutism.  Psychiatry evaluated & noted patient's personality style non-modifiable; therefore, plan to increase nighttime dose of Zydis to very aggressive dose of 20 mg coupled with his morning dose of 10 mg.  Patient is answering all questions appropriately at this  time & seems agreeable to following the recommendations of treatment team to possibly include ? EGD / CSCOPE pending GI decision.  ·   Acute metabolic encephalopathy.  Resolved. CTA head, neck, perfusion study report findings no perfusion abnormality to suggest complete recurrent acute infarct.  No arterial cutoff.  ·   COPD (chronic obstructive pulmonary disease) (CMS/[facility]).  Appears stable.   ·   Diabetes mellitus (CMS/[facility]).  Glucoscan acceptable.  Continue low-dose correctional lispro sliding scale.  RN reports patient eats independently.  ·   Hypertension.  BP rather low.  ? Consider midodrine & defer to nephrology for further assistance.   ·   ESRD (end stage renal disease) (CMS/[facility]).  Nephrology following, input appreciated.  Needs volume removed.  HD on Tuesday, 8/11/20.  ·   UTI (urinary tract infection).  Patient received empiric antibiotic therapy ceftriaxone and noted urine culture NGTD, afebrile, and no evidence of leukocytosis.  ·   Elevated troponin.  Chronic elevation likely 2/2 ESRD.  No signs of ACS or angina.  ·   Anemia due to chronic kidney disease / thrombocytopenia.  Likely secondary to ESRD versus GIB versus both.  Noted fecal occult blood positive, hemoglobin stable s/p pRBC transfusion x1 on 8/11/2020. Retacrit per nephrology.  Oncology signed off & and recommend transfuse 1 unit packed RBC with next dialysis & provide folic acid supplement 1 mg PO daily.  Monitor H&H.  ? Consider temporary cessation of ASA.  GI following, input appreciated--believes endoscopic evaluation will be problematic as patient will unlikely cooperate with bowel prep as he is currently refusing MiraLAX; however, if he is cooperative GI would consider bowel prep and potentially perform EGD colonoscopy and to evaluate anemia.    · SCD for DVT prophylaxis.  · No CPR  · Discussed with Dr. Otto.  · Anticipate discharge TBD / CCP following & reports patient long-time resident of [facility] > 1 year & plan to return to  same facility.  Bed at [facility] will reportedly '' on 8/15/20.      AMADA Nam  [facility]  20  13:08    I wore protective equipment throughout this patient encounter including a face mask, gloves and protective eyewear.  Hand hygiene was performed before donning protective equipment and after removal when leaving the room.

## 2020-08-13 NOTE — PROGRESS NOTES
Gastroenterology   Inpatient Progress Note    Reason for Follow Up: Anemia    Subjective     Interval History:   No reported bleeding.  Patient is very belligerent.  He denies any abdominal pain.  He says he wants to use the bathroom.    Current Facility-Administered Medications:   •  acetaminophen (TYLENOL) tablet 650 mg, 650 mg, Oral, Q4H PRN, 650 mg at 08/11/20 0840 **OR** acetaminophen (TYLENOL) 160 MG/5ML solution 650 mg, 650 mg, Oral, Q4H PRN **OR** acetaminophen (TYLENOL) suppository 650 mg, 650 mg, Rectal, Q4H PRN, Rainer Stevenson MD  •  albumin human 25 % IV SOLN 25 g, 25 g, Intravenous, 4x Daily PRN, Connor Vazquez MD  •  aspirin chewable tablet 81 mg, 81 mg, Oral, Daily, Janet Garrett MD, 81 mg at 08/13/20 0917  •  bisacodyl (DULCOLAX) suppository 10 mg, 10 mg, Rectal, Daily, Hung Rosenberg MD, 10 mg at 08/12/20 0912  •  dextrose (D50W) 25 g/ 50mL Intravenous Solution 25 g, 25 g, Intravenous, Q15 Min PRN, Vince Otto MD  •  dextrose (GLUTOSE) oral gel 15 g, 15 g, Oral, Q15 Min PRN, Vince Otto MD  •  folic acid (FOLVITE) tablet 1 mg, 1 mg, Oral, Daily, Luis F Zapata MD, 1 mg at 08/13/20 0917  •  glucagon (human recombinant) (GLUCAGEN DIAGNOSTIC) injection 1 mg, 1 mg, Subcutaneous, Q15 Min PRN, Vince Otto MD  •  insulin lispro (humaLOG) injection 0-7 Units, 0-7 Units, Subcutaneous, TID AC, Vince Otto MD, 2 Units at 08/13/20 0915  •  nitroglycerin (NITROSTAT) ointment 0.5 inch, 0.5 inch, Topical, Q6H PRN, Janet Garrett MD  •  nitroglycerin (NITROSTAT) SL tablet 0.4 mg, 0.4 mg, Sublingual, Q5 Min PRN, Rainer Stevenson MD, 0.4 mg at 08/08/20 0447  •  OLANZapine zydis (ZyPREXA) disintegrating tablet 10 mg, 10 mg, Oral, Daily, Luis F Myers III, MD, 10 mg at 08/13/20 0919  •  OLANZapine zydis (ZyPREXA) disintegrating tablet 20 mg, 20 mg, Oral, Nightly, Luis F Myers III, MD, 20 mg at 08/12/20 2004  •  ondansetron (ZOFRAN) injection 4 mg, 4  mg, Intravenous, Q6H PRN, Rainer Stevenson MD, 4 mg at 08/09/20 0644  •  polyethylene glycol (MIRALAX) packet 17 g, 17 g, Oral, BID PRN, Janet Bro APRN  •  sodium chloride 0.9 % flush 10 mL, 10 mL, Intravenous, PRN, Rainer Stevenson MD  •  sodium chloride 0.9 % flush 10 mL, 10 mL, Intravenous, Q12H, Rainer Stevenson MD, 10 mL at 08/13/20 0945  •  sodium chloride 0.9 % flush 10 mL, 10 mL, Intravenous, PRN, Rainer Stevenson MD  Review of Systems:    Review of systems could not be obtained due to  patient confusion.    Objective     Vital Signs  Temp:  [97.2 °F (36.2 °C)-97.8 °F (36.6 °C)] 97.8 °F (36.6 °C)  Heart Rate:  [94-95] 94  Resp:  [18] 18  BP: ()/(45-64) 93/47  Body mass index is 25.65 kg/m².    Intake/Output Summary (Last 24 hours) at 8/13/2020 1523  Last data filed at 8/13/2020 1420  Gross per 24 hour   Intake 720 ml   Output --   Net 720 ml     I/O this shift:  In: 720 [P.O.:720]  Out: -      Physical Exam:   General: patient awake, alert but belligerent   Eyes: no scleral icterus   Skin: warm and dry, not jaundiced   Abdomen: soft, nontender, nondistended; normal bowel sounds, no masses palpated, no periumbical lymphadenopathy   Psychiatric: Inappropriate affect/behavior     Results Review:     I reviewed the patient's new clinical results.    Results from last 7 days   Lab Units 08/13/20  0935 08/12/20  0540 08/11/20  1554   WBC 10*3/mm3 10.38 7.87 6.67   HEMOGLOBIN g/dL 7.0* 7.6* 6.5*   HEMATOCRIT % 23.3* 24.0* 20.0*   PLATELETS 10*3/mm3 130* 117* 109*  107*     Results from last 7 days   Lab Units 08/13/20  0934 08/12/20  0540 08/11/20  1555   SODIUM mmol/L 136 138 139   POTASSIUM mmol/L 5.1 4.9 3.9   CHLORIDE mmol/L 98 101 100   CO2 mmol/L 24.5 30.7* 26.3   BUN mg/dL 80* 48* 34*   CREATININE mg/dL 6.26* 4.38* 3.39*   CALCIUM mg/dL 8.3* 8.1* 8.1*   GLUCOSE mg/dL 166* 211* 156*         Lab Results   Lab Value Date/Time    LIPASE 23 04/03/2020 2355    LIPASE 85 12/19/2019 0925    LIPASE 140 09/01/2019  1645    LIPASE 84 05/09/2019 1317    LIPASE 147 02/25/2019 0558       Radiology:  MRI Brain Without Contrast   Final Result       No acute infarct. No findings to suggest hemorrhage at the midbrain,   although the exam is somewhat limited by motion artifact; the CT density   of concern on the earlier CT may represent vascular anomaly. Follow-up   evaluation with enhanced MRI may be helpful when/if possible, and close   follow-up CT could be obtained to ensure stability of the CT finding of   concern.       This report was finalized on 8/1/2020 3:43 PM by Dr. Jayjay Corbett M.D.          XR Chest 1 View   Final Result   Small likely atelectasis or scarring. Follow-up as   clinically indicated.       This report was finalized on 8/1/2020 1:34 PM by Dr. Jayjay Corbett M.D.          CT Cerebral Perfusion With & Without Contrast   Final Result           1. No perfusion abnormality to suggest completed or threatened acute   infarct.   2. No arterial cutoff. Narrowing at the bilateral ICA cavernous   segments, estimated at 65% on the right, 75% on the left.   3. A 3 to 4 mm hyperdense focus at the mid brain remains indeterminate,   could be a small focus of hemorrhage or vascular abnormality. Close   follow-up/further evaluation recommended.   4. Possible mucosal lesion in the trachea versus lobulated mucus.           The findings were discussed by telephone with Dr. Sanchez at 1235,   1248, 08/01/2020       This report was finalized on 8/1/2020 12:56 PM by Dr. Jayjay Corbett M.D.          CT Angiogram Neck   Final Result           1. No perfusion abnormality to suggest completed or threatened acute   infarct.   2. No arterial cutoff. Narrowing at the bilateral ICA cavernous   segments, estimated at 65% on the right, 75% on the left.   3. A 3 to 4 mm hyperdense focus at the mid brain remains indeterminate,   could be a small focus of hemorrhage or vascular abnormality. Close   follow-up/further  evaluation recommended.   4. Possible mucosal lesion in the trachea versus lobulated mucus.           The findings were discussed by telephone with Dr. Sanchez at 1235,   1248, 08/01/2020       This report was finalized on 8/1/2020 12:56 PM by Dr. Jayjay Corbett M.D.          CT Angiogram Head   Final Result           1. No perfusion abnormality to suggest completed or threatened acute   infarct.   2. No arterial cutoff. Narrowing at the bilateral ICA cavernous   segments, estimated at 65% on the right, 75% on the left.   3. A 3 to 4 mm hyperdense focus at the mid brain remains indeterminate,   could be a small focus of hemorrhage or vascular abnormality. Close   follow-up/further evaluation recommended.   4. Possible mucosal lesion in the trachea versus lobulated mucus.           The findings were discussed by telephone with Dr. Sanchez at 1235,   1248, 08/01/2020       This report was finalized on 8/1/2020 12:56 PM by Dr. Jayjay Corbett M.D.              Assessment/Plan     Patient Active Problem List   Diagnosis   • Acute metabolic encephalopathy   • Unresponsive   • COPD (chronic obstructive pulmonary disease) (CMS/[facility])   • Diabetes mellitus (CMS/[facility])   • Hypertension   • ESRD (end stage renal disease) (CMS/[facility])   • UTI (urinary tract infection)   • Elevated troponin   • Anemia due to chronic kidney disease       Assessment:  1. Anemia with heme positive stool.  No overt bleeding reported.  2. End-stage renal disease.  Plan for hemodialysis as per renal.  3. Altered mental status.  Appreciate psychiatry input.  Patient on Zyprexa.  4. Diabetes mellitus type II.  Stable.  5. Constipation.  Improved.      Plan:  · Monitor H&H.  · To consider endoscopic evaluation but given his other comorbidities and lack of cooperation I think it would be very difficult to get a good exam safely.  · Continue MiraLAX.  I discussed the patients findings and my recommendations with patient and nursing  staff.    MD Hung Mclain M.D.  [facility] [place] [address]  Office: [phone]

## 2020-08-13 NOTE — NURSING NOTE
Dialysis nurse called unit, and asked how patients behavior was overnight.  Nurse updated, was told that dialysis would have to be done in room, time still unknown.

## 2020-08-13 NOTE — CONSULTS
Purpose of the visit was to evaluate for: goals of care/advanced care planning, support for patient/family, hospice referral/discussion, transfer to comfort care bed/unit and comfort care. Spoke with MD, RN and CCP as well as patient and POA and discussed palliative care, goals of care, resuscitation status and [facility].      Assessment:  Patient is palliative care appropriate given ESRD on HD, AMS, uncontrolled diabetes type II. PPS: 30%. Psychosocial Acuity: 1-normal complexity. Spiritual Acuity: 1-normal complexity.     Recommendations/Plan: transfer to [facility] for palliative care. POA requests patient be privacy patient.     Other Comments:   Palliative Care spoke to patient regarding GOC. Patient alert and oriented. Patient stated that he wishes to stop dialysis and voiced understanding that he will die if he does not continue dialysis. Patient processing that he does not have a desired quality of life for himself and he wants to be able to do what he wants. Patient expressed that he wants to be able to make this decision while he is oriented to do so and knows that he will near end of life at some point so feels at peace with this decision to stop dialysis. Patient has discussed with his POA and sister, Kaylin who supports his decision. Discussed with Kaylin and she agrees that patient has not had a good quality of life for a long time and she wants him to be comfortable.     Discussed inpatient palliative care unit, hospice consult for possible HSB vs d/c disposition w/hospice services, explained symptom management, comfort care, and expectations in the dying process. Provided psychosocial support.     Patient's sister request that patient be a privacy patient due to patient's estranged brother wanting to speak to patient and get medical information but it upsets patient. Patient also requests to have a peanut butter and jelly sandwich. Patient wants to complete a living will as well.  PC will continue to follow  in patient's care.

## 2020-08-13 NOTE — CONSULTS
"Pt seen by Dr. Myers 08/03 and full AC consult completed 08/05. Both Dr. Myers and AC have been following pt throughout hospitalization. AC recently signed off. However this afternoon AC was consulted to see pt regarding agitation.    Chart reviewed. Per chart, pt refusing dialysis and wishes to \"be comfortable\". Pt's POA, Kaylin,  Voiced understanding of this and is in agreement with palliative consult and stopping of hemodialysis.    This RN spoke with pt's RN, Shayne, on unit to discuss pt's case. He stated that the decision had been made to transfer pt to palliative with comfort measures in place. Pt to be transferred later on this evening or early morning.     AC will sign off. Please call if needed.   "

## 2020-08-13 NOTE — NURSING NOTE
"Patient became increasingly agitated while Dialysis RN Demario was trying to begin dialysis.  Patient states \"Y'all don't understand, I'm done.  I just want to be comfortable and go to sleep and for this to be over.\"  Patient asked if he wished for dialysis to be stopped, for which he stated yes.  This was witnessed by both CCP nurse Archana Lott and Dialysis nurse Demario.  ROHINI Ewing called and incident reviewed with her.  Per Kaylin, \"He is just done and we just need to stop, he isn't happy and knows he has no quality of life.\"  Call placed to Dr. Otto for increased agitation and for family and patient wishes to stop dialysis and to obtain a palliative consult.  Telephone call received back, and was given orders for a psychiatric consult.  No other new orders at this time.  Call placed to nephrology in regards to family wanting to discontinue dialysis, awaiting call back and orders.     "

## 2020-08-13 NOTE — PROGRESS NOTES
"Continued Stay Note  [facility]     Patient Name: Lucien Avalos  MRN: [id number]  Today's Date: 8/13/2020    Admit Date: 8/1/2020    Discharge Plan     Row Name 08/13/20 2648       Plan    Plan  Referral to Palliative care     Patient/Family in Agreement with Plan  yes    Plan Comments  Pt was yelling and went into room, nurse Shayne and HD nurse Demario at bedside. Pt stated \"I am done, Im tired of dialysis and I just want to be put to sleep\". Pt pleaded with staff to make him \"comfortable\". CCP provided reassurance explained had phillip BINGHAM and would call his sister. CCP called Kaylin via outbound call provided update on bed status from [facility]. Also discussed pt refusing HD. She states she is aware, he spoke with her on the phone last night for over an hour, discussing that he \"has done HD for 5 years, and he is tired\". She wishes to honor his wishes. CCP explained could make a palliative consult for further discussion and stopping HD. She verbalized understanding and wants pt \"suffering to stop\". CCP spoke with Chana/Hospitalist coordinator for Dr. Hartley to call RN. Nurse to notify Nephrology regarding pt refusing HD. CCP provided reassurance and comfort to pt at bedside, spoke with him and provided ice cream. Reasurred pt we are awaiting MD rounding for further orders. [email or web address] [email or web address]        Discharge Codes    No documentation.       Expected Discharge Date and Time     Expected Discharge Date Expected Discharge Time    Aug 7, 2020             Archana Lott RN    "

## 2020-08-13 NOTE — PAYOR COMM NOTE
"Allen Avalos (75 y.o. Male)     PLEASE SEE ATTACHED CLINICAL REVIEW.    REF#[id number]    PLEASE CALL   OR  [phone] WITH INPT AUTH AND DAYS APPROVED.     THANK YOU    CRISTHIAN DOWNEY LPN CCP    Date of Birth Social Security Number Address Home Phone MRN    1945  [place] [address] [place] [address] [phone] [id number]    Islam Marital Status          None Unknown       Admission Date Admission Type Admitting Provider Attending Provider Department, Room/Bed    8/1/20 Emergency Rainer Stevenson MD Kapila, Abhishek, MD [address], [address]    Discharge Date Discharge Disposition Discharge Destination                       Attending Provider:  Vince Otto MD    Allergies:  No Known Allergies    Isolation:  None   Infection:  None   Code Status:  No CPR    Ht:  170.2 cm (67.01\")   Wt:  74.3 kg (163 lb 12.8 oz)    Admission Cmt:  None   Principal Problem:  Unresponsive [R41.89]                 Active Insurance as of 8/1/2020     Primary Coverage     Payor Plan Insurance Group Employer/Plan Group    [employer] MED[place]PLAC[place] WELLCARE MEDICARE REPLACEMENT [id number]     Payor Plan Address Payor Plan Phone Number Payor Plan Fax Number Effective Dates    [address] [address] [phone]  8/1/2020 - None Entered    [facility] [id number]       Subscriber Name Subscriber Birth Date Member ID       Allen Avalos 1945 [id number]           Secondary Coverage     Payor Plan Insurance Group Employer/Plan Group    [place]      Payor Plan Address Payor Plan Phone Number Payor Plan Fax Number Effective Dates    [address] [phone]  8/1/2020 - None Entered    [facility] [address]       Subscriber Name Subscriber Birth Date Member ID       ALLEN AVALOS 1945 [id number]                 Emergency Contacts      (Rel.) Home Phone Work Phone Mobile Phone    ROHINI ZHAO (Sister) " [phone] -- --            Vital Signs (last day)     Date/Time   Temp   Temp src   Pulse   Resp   BP   Patient Position   SpO2    08/13/20 0838   --   Oral   95   18   106/64   Lying   --    08/12/20 2329   97.2 (36.2)   Oral   94   18   96/45   Lying   97    08/12/20 2009   --   --   95   18   91/64   Sitting   99    08/12/20 1351   98.2 (36.8)   Oral   94   16   95/55   Lying   94    08/12/20 0810   97.9 (36.6)   Oral   --   16   94/55   Sitting   --    08/12/20 0512   97.7 (36.5)   Oral   --   16   109/62   Lying   --    08/12/20 0009   98.2 (36.8)   Oral   66   18   106/52   Lying   96              Oxygen Therapy (last day)     Date/Time   SpO2   Device (Oxygen Therapy)   Flow (L/min)   Oxygen Concentration (%)   ETCO2 (mmHg)    08/13/20 0920   --   nasal cannula   2   --   --    08/13/20 0838   --   room air   --   --   --    08/12/20 2329   97   nasal cannula   3   --   --    08/12/20 2009   99   room air   --   --   --    08/12/20 1459   --   nasal cannula   2   --   --    08/12/20 1351   94   nasal cannula   2   --   --    08/12/20 0856   --   nasal cannula   2   --   --    08/12/20 0512   --   nasal cannula   2   --   --    08/12/20 0200   --   nasal cannula   2   --   --    08/12/20 0009   96   nasal cannula   2   --   --              Intake & Output (last day)       08/12 0701 - 08/13 0700 08/13 0701 - 08/14 0700    P.O. 1080 360    I.V. (mL/kg)      Blood      Total Intake(mL/kg) 1080 (14.5) 360 (4.8)    Other      Total Output      Net +1080 +360          Urine Unmeasured Occurrence 2 x 1 x    Stool Unmeasured Occurrence 4 x         Lines, Drains & Airways    Active LDAs     None                CIWA (last day)     None        Blood Administration Record (From admission, onward)    Completed transfusions     Ordered     Start    08/11/20 1354  Transfuse RBC, 1 Units Infuse Each Unit Over: 3.5H  Transfusion     Released Time Blood Unit Number Status   08/11/20 1747   20  [id number]  [id number]  Completed 08/11/20 2214       08/11/20 1353          Canceled transfusions     Ordered     Start    08/11/20 1141  Transfuse RBC, 2 Units Infuse Each Unit Over: 3.5H  Transfusion,   Status:  Canceled      08/11/20 1141                Orders (last 24 hrs)      Start     Ordered    08/14/20 0600  CBC (No Diff)  Morning Draw      08/13/20 1141    08/13/20 1059  POC Glucose Once  Once      08/13/20 1057    08/13/20 0930  Type & Screen  STAT      08/13/20 0930    08/13/20 0919  POC Glucose Once  Once      08/13/20 0906    08/13/20 0900  OLANZapine zydis (ZyPREXA) disintegrating tablet 10 mg  Daily      08/12/20 1134    08/13/20 0600  CBC (No Diff)  Morning Draw      08/12/20 1323    08/13/20 0600  Basic Metabolic Panel  Morning Draw      08/12/20 1323    08/13/20 0000  Hemodialysis Inpatient  Once     Comments:  Give 2 units PRBC on dialysis 8/13.    08/12/20 1444    08/12/20 2152  POC Glucose Once  Once      08/12/20 2151    08/12/20 2100  OLANZapine zydis (ZyPREXA) disintegrating tablet 20 mg  Nightly      08/12/20 1134    08/12/20 1718  POC Glucose Once  Once      08/12/20 1713    08/12/20 1533  Type & Screen  STAT      08/12/20 1533    08/12/20 1451  polyethylene glycol (MIRALAX) packet 17 g  2 Times Daily PRN      08/12/20 1451    08/12/20 1444  Verify Informed Consent for Blood Product Administration  Once      08/12/20 1444    08/12/20 1444  Prepare RBC, 2 Units  Blood - Once     Comments:  Give on dialysis 8/13.      08/12/20 1444    08/12/20 1400  folic acid (FOLVITE) tablet 1 mg  Daily      08/12/20 1155    08/11/20 1000  bisacodyl (DULCOLAX) suppository 10 mg  Daily      08/11/20 0910    08/08/20 0900  aspirin chewable tablet 81 mg  Daily      08/08/20 0553    08/08/20 0552  nitroglycerin (NITROSTAT) ointment 0.5 inch  Every 6 Hours PRN      08/08/20 0553    08/06/20 0000  OLANZapine zydis (ZyPREXA) 10 MG disintegrating tablet  2 times daily      08/06/20 1617    08/06/20 0000  Discharge Follow-up with PCP       08/06/20 1617    08/06/20 0000  Activity as Tolerated      08/06/20 1617    08/06/20 0000  Diet: Regular; Thin     Comments:  soft texture, ground    08/06/20 1617    08/05/20 1100  POC Glucose 4x Daily AC & at Bedtime  4 Times Daily Before Meals & at Bedtime      08/05/20 0828    08/05/20 0915  insulin lispro (humaLOG) injection 0-7 Units  3 Times Daily Before Meals      08/05/20 0828    08/05/20 0827  dextrose (GLUTOSE) oral gel 15 g  Every 15 Minutes PRN      08/05/20 0828    08/05/20 0827  dextrose (D50W) 25 g/ 50mL Intravenous Solution 25 g  Every 15 Minutes PRN      08/05/20 0828    08/05/20 0827  glucagon (human recombinant) (GLUCAGEN DIAGNOSTIC) injection 1 mg  Every 15 Minutes PRN      08/05/20 0828    08/04/20 1843  albumin human 25 % IV SOLN 25 g  4 Times Daily PRN      08/04/20 1845    08/04/20 1700  POC Glucose Finger 4x Daily AC & at Bedtime  4 Times Daily Before Meals & at Bedtime      08/04/20 1146    08/01/20 2100  sodium chloride 0.9 % flush 10 mL  Every 12 Hours Scheduled      08/01/20 1915    08/01/20 1914  ondansetron (ZOFRAN) injection 4 mg  Every 6 Hours PRN      08/01/20 1915    08/01/20 1913  acetaminophen (TYLENOL) tablet 650 mg  Every 4 Hours PRN      08/01/20 1915    08/01/20 1913  acetaminophen (TYLENOL) 160 MG/5ML solution 650 mg  Every 4 Hours PRN      08/01/20 1915    08/01/20 1913  acetaminophen (TYLENOL) suppository 650 mg  Every 4 Hours PRN      08/01/20 1915    08/01/20 1911  nitroglycerin (NITROSTAT) SL tablet 0.4 mg  Every 5 Minutes PRN      08/01/20 1915    08/01/20 1911  sodium chloride 0.9 % flush 10 mL  As Needed      08/01/20 1915 08/01/20 1213  sodium chloride 0.9 % flush 10 mL  As Needed      08/01/20 1213    Unscheduled  Straight cath  As Needed      08/01/20 8193    Unscheduled  Telemetry - Pulse Oximetry  Continuous PRN     Comments:  If Patient Develops Unresponsiveness, Acute Dyspnea, Cyanosis or Suspected Hypoxemia Start Continuous Pulse Ox Monitoring, Apply  Oxygen & Notify Provider    08/01/20 1915    Unscheduled  Oxygen Therapy- Nasal Cannula; Titrate for SPO2: 90% - 95%  Continuous PRN     Comments:  If Patient Develops Unresponsiveness, Acute Dyspnea, Cyanosis or Suspected Hypoxemia Start Continuous Pulse Ox Monitoring, Apply Oxygen & Notify Provider    08/01/20 1915    Unscheduled  ECG 12 Lead  As Needed     Comments:  Nurse to Release if Patient Expericences Acute Chest Pain or Dysrhythmias    08/01/20 1915    Unscheduled  Potassium  As Needed     Comments:  For Ventricular Arrhythmias      08/01/20 1915    Unscheduled  Magnesium  As Needed     Comments:  For Ventricular Arrhythmias      08/01/20 1915    Unscheduled  Troponin  As Needed     Comments:  For Chest Pain      08/01/20 1915    Unscheduled  Digoxin Level  As Needed     Comments:  For Atrial Arrhythmias      08/01/20 1915    Unscheduled  Blood Gas, Arterial  As Needed     Comments:  Per O2 PolicyNotify Physician      08/01/20 1915    --  Nutritional Supplements (PROMOD PO)  Daily      08/01/20 1256    --  sucralfate (CARAFATE) 1 g tablet  4 Times Daily      08/01/20 1256    --  ipratropium-albuterol (COMBIVENT RESPIMAT)  MCG/ACT inhaler  Every 8 Hours PRN      08/01/20 1256    --  HYDROcodone-acetaminophen (NORCO) 5-325 MG per tablet  Every 6 Hours PRN      08/01/20 1256    --  insulin detemir (LEVEMIR) 100 UNIT/ML injection  Daily      08/01/20 1256    --  atorvastatin (LIPITOR) 40 MG tablet  Daily      08/01/20 1256    --  losartan (COZAAR) 25 MG tablet  Daily      08/01/20 1256    --  clopidogrel (PLAVIX) 75 MG tablet  Daily      08/01/20 1256    --  pantoprazole (PROTONIX) 40 MG EC tablet  2 times daily      08/01/20 1256    --  FLUoxetine (PROzac) 20 MG capsule  Daily      08/01/20 1256    --  temazepam (RESTORIL) 15 MG capsule  Nightly      08/01/20 1256    --  Sucroferric Oxyhydroxide (Velphoro) 500 MG chewable tablet  3 Times Daily      08/01/20 1256    --  albuterol sulfate  (90  "Base) MCG/ACT inhaler  Every 6 Hours PRN      08/01/20 1256    --  hydrOXYzine (ATARAX) 50 MG tablet  Every 12 Hours      08/01/20 1256    --  midodrine (PROAMATINE) 10 MG tablet  Daily      08/01/20 1318    --  SCANNED EKG      08/01/20 0000                Operative/Procedure Notes (last 24 hours) (Notes from 08/12/20 1347 through 08/13/20 1347)    No notes of this type exist for this encounter.            Physician Progress Notes (last 24 hours) (Notes from 08/12/20 1347 through 08/13/20 1347)      Jasmin Dooley MD at 08/13/20 1139             LOS: 12 days    Patient Care Team:  PersonMatilde MD as PCP - General (Internal Medicine)    Chief Complaint:    Chief Complaint   Patient presents with   • Altered Mental Status   • Hypotension     Follow-up ESRD  Subjective     Interval History:   Tells me he just wants to be put to sleep. He is tired of all of this.  When I ask specifically if he wants to stop dialysis, he says, \" no, I will accept dialysis.\"   Slept well last night with increased dose of Zyprexa.   Review of Systems:   Not appropriate.    Objective     Vital Signs  Temp:  [97.2 °F (36.2 °C)-98.2 °F (36.8 °C)] 97.2 °F (36.2 °C)  Heart Rate:  [94-95] 95  Resp:  [16-18] 18  BP: ()/(45-64) 106/64    Flowsheet Rows      First Filed Value   Admission Height  170.2 cm (67\") Documented at 08/01/2020 1247   Admission Weight  71.7 kg (158 lb 1.6 oz) Documented at 08/01/2020 1210          I/O this shift:  In: 360 [P.O.:360]  Out: -   I/O last 3 completed shifts:  In: 1530 [P.O.:1080; I.V.:200; Blood:250]  Out: -     Intake/Output Summary (Last 24 hours) at 8/13/2020 1139  Last data filed at 8/13/2020 0920  Gross per 24 hour   Intake 720 ml   Output --   Net 720 ml       Physical Exam:  General Appearance: Sitting up in chair.  Calm, pleasant.  Skin: warm and dry  HEENT: Pupils round reactive to light, nonicteric sclerae  Neck: No JVD.  Lungs: Clear to auscultation, no wheezing .  Heart: RRR, " normal S1 and S2, no S3, no rub  Abdomen: soft, nontender,+ bs.  Extremities:RUE AVF patent . 1+ lower ext edema.           Results Review:    Results from last 7 days   Lab Units 08/13/20  0934 08/12/20  0540 08/11/20  1555   SODIUM mmol/L 136 138 139   POTASSIUM mmol/L 5.1 4.9 3.9   CHLORIDE mmol/L 98 101 100   CO2 mmol/L 24.5 30.7* 26.3   BUN mg/dL 80* 48* 34*   CREATININE mg/dL 6.26* 4.38* 3.39*   CALCIUM mg/dL 8.3* 8.1* 8.1*   GLUCOSE mg/dL 166* 211* 156*       Estimated Creatinine Clearance: 11 mL/min (A) (by C-G formula based on SCr of 6.26 mg/dL (H)).    Results from last 7 days   Lab Units 08/10/20  1319 08/09/20  0447 08/08/20  0427   MAGNESIUM mg/dL  --   --  2.1   PHOSPHORUS mg/dL 3.7 4.6* 4.8*             Results from last 7 days   Lab Units 08/13/20  0935 08/12/20  0540 08/11/20  1554 08/10/20  1319 08/09/20  0447   WBC 10*3/mm3 10.38 7.87 6.67 5.11 5.75   HEMOGLOBIN g/dL 7.0* 7.6* 6.5* 7.3* 7.9*   PLATELETS 10*3/mm3 130* 117* 109*  107* 106* 99*               Imaging Results (Last 24 Hours)     ** No results found for the last 24 hours. **          aspirin 81 mg Oral Daily   bisacodyl 10 mg Rectal Daily   folic acid 1 mg Oral Daily   insulin lispro 0-7 Units Subcutaneous TID AC   OLANZapine zydis 10 mg Oral Daily   OLANZapine zydis 20 mg Oral Nightly   sodium chloride 10 mL Intravenous Q12H          Medication Review:   Current Facility-Administered Medications   Medication Dose Route Frequency Provider Last Rate Last Dose   • acetaminophen (TYLENOL) tablet 650 mg  650 mg Oral Q4H PRN Rainer Stevenson MD   650 mg at 08/11/20 0840    Or   • acetaminophen (TYLENOL) 160 MG/5ML solution 650 mg  650 mg Oral Q4H PRN Rainer Stevenson MD        Or   • acetaminophen (TYLENOL) suppository 650 mg  650 mg Rectal Q4H PRN Rainer Stevenson MD       • albumin human 25 % IV SOLN 25 g  25 g Intravenous 4x Daily PRN Connor Vazquez MD       • aspirin chewable tablet 81 mg  81 mg Oral Daily Janet Garrett MD   81 mg at  08/13/20 0917   • bisacodyl (DULCOLAX) suppository 10 mg  10 mg Rectal Daily Hung Rosenberg MD   10 mg at 08/12/20 0912   • dextrose (D50W) 25 g/ 50mL Intravenous Solution 25 g  25 g Intravenous Q15 Min PRN Vince Otto MD       • dextrose (GLUTOSE) oral gel 15 g  15 g Oral Q15 Min PRN Vince Otto MD       • folic acid (FOLVITE) tablet 1 mg  1 mg Oral Daily Luis F Zapata MD   1 mg at 08/13/20 0917   • glucagon (human recombinant) (GLUCAGEN DIAGNOSTIC) injection 1 mg  1 mg Subcutaneous Q15 Min PRN Vince Otto MD       • insulin lispro (humaLOG) injection 0-7 Units  0-7 Units Subcutaneous TID AC Vince Otto MD   2 Units at 08/13/20 0915   • nitroglycerin (NITROSTAT) ointment 0.5 inch  0.5 inch Topical Q6H PRN Janet Garrett MD       • nitroglycerin (NITROSTAT) SL tablet 0.4 mg  0.4 mg Sublingual Q5 Min PRN Rainer Stevenson MD   0.4 mg at 08/08/20 0447   • OLANZapine zydis (ZyPREXA) disintegrating tablet 10 mg  10 mg Oral Daily Luis F Myers III, MD   10 mg at 08/13/20 0919   • OLANZapine zydis (ZyPREXA) disintegrating tablet 20 mg  20 mg Oral Nightly Luis F Myers III, MD   20 mg at 08/12/20 2004   • ondansetron (ZOFRAN) injection 4 mg  4 mg Intravenous Q6H PRN Rainer Stevenson MD   4 mg at 08/09/20 0644   • polyethylene glycol (MIRALAX) packet 17 g  17 g Oral BID PRN Janet Bro APRN       • sodium chloride 0.9 % flush 10 mL  10 mL Intravenous PRN Rainer Stevenson MD       • sodium chloride 0.9 % flush 10 mL  10 mL Intravenous Q12H Rainer Stevenson MD   10 mL at 08/13/20 0945   • sodium chloride 0.9 % flush 10 mL  10 mL Intravenous PRN Rainer Stevenson MD           Assessment/Plan   1.  ESRD.  Dialysis today.       2.  Altered mental state.  Psych note reviewed. ZYprexa increased.   3.  Anemia and thrombocytopenia.  Hg 7  Subq procrit 20K yesterday.  Heme positive. Agree with Dr. Rosenberg that endoscopy would be too high risk with recent NSTEMI, lack of cooperation.  2 units PRBC with HD today.   4.  Diabetes mellitus type 2  5.  Pyuria.  No growth on urine culture.   6. TCP, chronic.    Plan:  1. Dialysis today.  Will need to dialyze in his room rather than the dialysis unit as he is very disruptive to the other patients when agitated .  2. PRBC with dialysis today 2 units.       Jasmin Dooley MD  20  11:39      Electronically signed by Jasmin Dooley MD at 20 1141     Sherman Kuhn MD at 20 1444          [facility]  Cardiology Progress Note    Patient Identification:  Name: Lucien Avalos  Age: 75 y.o.  Sex: male  :  1945  MRN: [id number]                 Follow Up / Chief Complaint: management recommendations of elevated troponin chest pain    Interval History: Treat conservatively     Subjective:      Objective:    Past Medical History:  Past Medical History:   Diagnosis Date   • Anxiety    • CHF (congestive heart failure) (CMS/HCC)    • Chronic pain    • COPD (chronic obstructive pulmonary disease) (CMS/HCC)    • Diabetes mellitus (CMS/HCC)    • GERD (gastroesophageal reflux disease)    • Hyperlipidemia    • Hypertension    • Hypotension    • Insomnia    • Mood disorder (CMS/HCC)    • Renal failure    • Respiratory failure (CMS/HCC)      Past Surgical History:  History reviewed. No pertinent surgical history.     Social History:   Social History     Tobacco Use   • Smoking status: Former Smoker     Types: Cigarettes   • Smokeless tobacco: Never Used   Substance Use Topics   • Alcohol use: Never     Frequency: Never      Family History:  History reviewed. No pertinent family history.       Allergies:  No Known Allergies  Scheduled Meds:    aspirin 81 mg Daily   bisacodyl 10 mg Daily   folic acid 1 mg Daily   insulin lispro 0-7 Units TID AC   [START ON 2020] OLANZapine zydis 10 mg Daily   OLANZapine zydis 20 mg Nightly   sodium chloride 10 mL Q12H           INTAKE AND OUTPUT:    Intake/Output Summary (Last 24  "hours) at 8/12/2020 1445  Last data filed at 8/12/2020 0856  Gross per 24 hour   Intake 1470 ml   Output --   Net 1470 ml      ROS  Constitutional: Awake and alert, no fever. No nosebleeds  Abdomen           no abdominal pain   Cardiac              no chest pain  Pulmonary          no shortness of breath      BP 95/55 (BP Location: Left arm, Patient Position: Lying)   Pulse 94   Temp 98.2 °F (36.8 °C) (Oral)   Resp 16   Ht 170.2 cm (67.01\")   Wt 74.3 kg (163 lb 12.8 oz) Comment: not weighed by RD  SpO2 94%   BMI 25.65 kg/m²    General appearance: No acute changes   Neck: Trachea midline; NECK, supple, no thyromegaly or lymphadenopathy   Lungs: Normal size and shape, normal breath sounds, equal distribution of air, no rales and rhonchi   CV: S1-S2 regular, no murmurs, no rub, no gallop   Abdomen: Soft, non-tender; no masses , no abnormal abdominal sounds   Extremities: No deformity , normal color , no peripheral edema   Skin: Normal temperature, turgor and texture; no rash, ulcers        Cardiographics  Telemetry:           ECG:           Echocardiogram:   Summary   The ejection fraction biplane was calculated at 42 %.   Hypokinetic motion of the apical inferior wall noted in the left ventricle.   Moderate concentric left ventricular hypertrophy observed.   The left atrium is mildly dilated.   There is mild right atrial enlargement.   Moderate aortic stenosis is present.   Mild mitral regurgitation is present.   Mild tricuspid regurgitation.  Lab Review   Results from last 7 days   Lab Units 08/08/20  0427   TROPONIN T ng/mL 5.620*     Results from last 7 days   Lab Units 08/08/20  0427   MAGNESIUM mg/dL 2.1     Results from last 7 days   Lab Units 08/12/20  0540   SODIUM mmol/L 138   POTASSIUM mmol/L 4.9   BUN mg/dL 48*   CREATININE mg/dL 4.38*   CALCIUM mg/dL 8.1*        Results from last 7 days   Lab Units 08/12/20  0540 08/11/20  1554 08/10/20  1319   WBC 10*3/mm3 7.87 6.67 5.11   HEMOGLOBIN g/dL 7.6* 6.5* " "7.3*   HEMATOCRIT % 24.0* 20.0* 23.3*   PLATELETS 10*3/mm3 117* 109*  107* 106*        The following medical decision was discussed in detail with Dr. Kuhn    Assessment:  1. Non-Q wave MI  2. Dementia      Plan:  Blood pressure labile. SR on monitor. Hgb 7.6, defer to Im. He has had a Q wave MI and he will be treated conservatively.  We will sign off at this time.        )8/12/2020  AMADA Stone     Patient personally interviewed and above subjective findings personally confirmed during a face to face contact with patient today  All findings of physical examination confirmed  All pertinent and performed labs, cardiac procedures ,  radiographs of the last 24 hours personally reviewed  Impression and plans discussed/elaborated and implemented jointly as described above     Sherman Kuhn MD                  EMR Dragon/Transcription:   \"Dictated utilizing Dragon dictation\".       Electronically signed by Sherman Kuhn MD at 08/12/20 1708     Janet Bro APRN at 08/12/20 1442          Gastroenterology   Inpatient Progress Note    Reason for Follow Up: Anemia    Subjective     Interval History:   Reports having 1 darker colored stool.  Patient continues to report constipation and the need to strain to have a BM.  Reports mild abdominal tenderness.  Tolerating diet.  Denies any nausea or vomiting.    Current Facility-Administered Medications:   •  acetaminophen (TYLENOL) tablet 650 mg, 650 mg, Oral, Q4H PRN, 650 mg at 08/11/20 0840 **OR** acetaminophen (TYLENOL) 160 MG/5ML solution 650 mg, 650 mg, Oral, Q4H PRN **OR** acetaminophen (TYLENOL) suppository 650 mg, 650 mg, Rectal, Q4H PRN, Rainer Stevenson MD  •  albumin human 25 % IV SOLN 25 g, 25 g, Intravenous, 4x Daily PRN, Connor Vazquez MD  •  aspirin chewable tablet 81 mg, 81 mg, Oral, Daily, Janet Garrett MD, 81 mg at 08/12/20 0912  •  bisacodyl (DULCOLAX) suppository 10 mg, 10 mg, Rectal, Daily, Hung Rosenberg, " MD, 10 mg at 08/12/20 0912  •  dextrose (D50W) 25 g/ 50mL Intravenous Solution 25 g, 25 g, Intravenous, Q15 Min PRN, Vince Otto MD  •  dextrose (GLUTOSE) oral gel 15 g, 15 g, Oral, Q15 Min PRN, Vince Otto MD  •  folic acid (FOLVITE) tablet 1 mg, 1 mg, Oral, Daily, Luis F Zapata MD  •  glucagon (human recombinant) (GLUCAGEN DIAGNOSTIC) injection 1 mg, 1 mg, Subcutaneous, Q15 Min PRN, Vince Otto MD  •  insulin lispro (humaLOG) injection 0-7 Units, 0-7 Units, Subcutaneous, TID AC, Vince Otto MD, 2 Units at 08/12/20 1135  •  nitroglycerin (NITROSTAT) ointment 0.5 inch, 0.5 inch, Topical, Q6H PRN, Janet Garrett MD  •  nitroglycerin (NITROSTAT) SL tablet 0.4 mg, 0.4 mg, Sublingual, Q5 Min PRN, Rainer Stevenson MD, 0.4 mg at 08/08/20 0447  •  [START ON 8/13/2020] OLANZapine zydis (ZyPREXA) disintegrating tablet 10 mg, 10 mg, Oral, Daily, Luis F Myers III, MD  •  OLANZapine zydis (ZyPREXA) disintegrating tablet 20 mg, 20 mg, Oral, Nightly, Luis F Myers III, MD  •  ondansetron (ZOFRAN) injection 4 mg, 4 mg, Intravenous, Q6H PRN, Rainer Stevenson MD, 4 mg at 08/09/20 0644  •  sodium chloride 0.9 % flush 10 mL, 10 mL, Intravenous, PRN, Rainer Stevenson MD  •  sodium chloride 0.9 % flush 10 mL, 10 mL, Intravenous, Q12H, Rainer Stevenson MD, 10 mL at 08/12/20 0900  •  sodium chloride 0.9 % flush 10 mL, 10 mL, Intravenous, PRN, Rainer Stevenson MD  Review of Systems:    All systems were reviewed and negative except for:  Gastrointestinal: positive for  constipation    Objective     Vital Signs  Temp:  [97.3 °F (36.3 °C)-98.6 °F (37 °C)] 98.2 °F (36.8 °C)  Heart Rate:  [64-94] 94  Resp:  [16-18] 16  BP: ()/(52-68) 95/55  Body mass index is 25.65 kg/m².    Intake/Output Summary (Last 24 hours) at 8/12/2020 1443  Last data filed at 8/12/2020 0856  Gross per 24 hour   Intake 1470 ml   Output --   Net 1470 ml     I/O this shift:  In: 720 [P.O.:720]  Out: -      Physical  Exam:   General: patient awake, alert and cooperative   Eyes: no scleral icterus   Skin: warm and dry, not jaundiced   Abdomen: soft, nontender, nondistended; normal bowel sounds, no masses palpated, no periumbical lymphadenopathy   Psychiatric: Appropriate affect and behavior     Results Review:     I reviewed the patient's new clinical results.    Results from last 7 days   Lab Units 08/12/20  0540 08/11/20  1554 08/10/20  1319   WBC 10*3/mm3 7.87 6.67 5.11   HEMOGLOBIN g/dL 7.6* 6.5* 7.3*   HEMATOCRIT % 24.0* 20.0* 23.3*   PLATELETS 10*3/mm3 117* 109*  107* 106*     Results from last 7 days   Lab Units 08/12/20  0540 08/11/20  1555 08/10/20  1319   SODIUM mmol/L 138 139 138   POTASSIUM mmol/L 4.9 3.9 4.7   CHLORIDE mmol/L 101 100 98   CO2 mmol/L 30.7* 26.3 27.1   BUN mg/dL 48* 34* 60*   CREATININE mg/dL 4.38* 3.39* 6.04*   CALCIUM mg/dL 8.1* 8.1* 8.0*   GLUCOSE mg/dL 211* 156* 207*         Lab Results   Lab Value Date/Time    LIPASE 23 04/03/2020 2355    LIPASE 85 12/19/2019 0925    LIPASE 140 09/01/2019 1645    LIPASE 84 05/09/2019 1317    LIPASE 147 02/25/2019 0558       Radiology:  MRI Brain Without Contrast   Final Result       No acute infarct. No findings to suggest hemorrhage at the midbrain,   although the exam is somewhat limited by motion artifact; the CT density   of concern on the earlier CT may represent vascular anomaly. Follow-up   evaluation with enhanced MRI may be helpful when/if possible, and close   follow-up CT could be obtained to ensure stability of the CT finding of   concern.       This report was finalized on 8/1/2020 3:43 PM by Dr. Jayjay Corbett M.D.          XR Chest 1 View   Final Result   Small likely atelectasis or scarring. Follow-up as   clinically indicated.       This report was finalized on 8/1/2020 1:34 PM by Dr. Jayjay Corbett M.D.          CT Cerebral Perfusion With & Without Contrast   Final Result           1. No perfusion abnormality to suggest completed or  threatened acute   infarct.   2. No arterial cutoff. Narrowing at the bilateral ICA cavernous   segments, estimated at 65% on the right, 75% on the left.   3. A 3 to 4 mm hyperdense focus at the mid brain remains indeterminate,   could be a small focus of hemorrhage or vascular abnormality. Close   follow-up/further evaluation recommended.   4. Possible mucosal lesion in the trachea versus lobulated mucus.           The findings were discussed by telephone with Dr. Sanhcez at 1235,   1248, 08/01/2020       This report was finalized on 8/1/2020 12:56 PM by Dr. Jayjay Corbett M.D.          CT Angiogram Neck   Final Result           1. No perfusion abnormality to suggest completed or threatened acute   infarct.   2. No arterial cutoff. Narrowing at the bilateral ICA cavernous   segments, estimated at 65% on the right, 75% on the left.   3. A 3 to 4 mm hyperdense focus at the mid brain remains indeterminate,   could be a small focus of hemorrhage or vascular abnormality. Close   follow-up/further evaluation recommended.   4. Possible mucosal lesion in the trachea versus lobulated mucus.           The findings were discussed by telephone with Dr. Sanchez at 1237,   1248, 08/01/2020       This report was finalized on 8/1/2020 12:56 PM by Dr. Jayjay Corbett M.D.          CT Angiogram Head   Final Result           1. No perfusion abnormality to suggest completed or threatened acute   infarct.   2. No arterial cutoff. Narrowing at the bilateral ICA cavernous   segments, estimated at 65% on the right, 75% on the left.   3. A 3 to 4 mm hyperdense focus at the mid brain remains indeterminate,   could be a small focus of hemorrhage or vascular abnormality. Close   follow-up/further evaluation recommended.   4. Possible mucosal lesion in the trachea versus lobulated mucus.           The findings were discussed by telephone with Dr. Sanchez at 1235,   1248, 08/01/2020       This report was finalized on 8/1/2020  12:56 PM by Dr. Jayjay Corbett M.D.              Assessment/Plan     Patient Active Problem List   Diagnosis   • Acute metabolic encephalopathy   • Unresponsive   • COPD (chronic obstructive pulmonary disease) (CMS/[facility])   • Diabetes mellitus (CMS/[facility])   • Hypertension   • ESRD (end stage renal disease) (CMS/[facility])   • UTI (urinary tract infection)   • Elevated troponin   • Anemia due to chronic kidney disease       Assessment:  1. Anemia with heme positive stool.  H/H today at 7.6/24%.  Patient reports one darker colored stool yesterday-unsure if blood was present or if stool was just dark brown.  2. Metabolic encephalopathy.  This seems to be improved as patient was very cooperative and oriented today.  3. Chest pain.  Cardiology following.  4. Type 2 diabetes mellitus, stable.  5. COPD, which appears to be stable today patient does not demonstrate any shortness of air.  6. ESRD.  Plan for dialysis tomorrow with 2 units PRBCs per renal.  7. Constipation.  Patient has agreed to utilize MiraLAX in conjunction with his suppository.      Plan:  · The patient is at high risk for procedures requiring sedation secondary to his multiple comorbidities including CHF with a recent non-Q MI, COPD, as well as end-stage renal disease.  We will continue to monitor his H/H.  Ultimately, we feel that endoscopic evaluation via EGD and colonoscopy is indicated given his current issue with anemia and heme positive stool; however, this may be difficult given his multiple comorbidities.  Clearance would need to be obtained from cardiac, renal, and pulmonary specialties before proceeding.  · For constipation, MiraLAX has been added which will likely help with management of his diffuse abdominal tenderness as well.   · We will follow H&H and assess his CBC in the am to determine stability.  If his H&H continues to drop, may need to discuss the need for EGD and colonoscopy with the different medical disciplines involved in his care for  "further evaluation of GI blood loss. But agree that patient would be at high risk.   · We will continue to monitor.    I discussed the patients findings and my recommendations with patient and nursing staff.    AMADA Moran M.D.  [facility], operated by [facility] [address] [address]  Office: [phone]      Electronically signed by Janet Bro APRN at 08/12/20 1455     Jasmin Dooley MD at 08/12/20 1438             LOS: 11 days    Patient Care Team:  Matilde Hi MD as PCP - General (Internal Medicine)    Chief Complaint:    Chief Complaint   Patient presents with   • Altered Mental Status   • Hypotension     Follow-up end-stage renal  Subjective     Interval History:   Sitting up in chair. Eating Peanut butter and aundrea crackers.  Says he did not get lunch.  Denies pain.  Denies SOA.    Bowels moving.  One unit PRBC last night .    Review of Systems:   As noted    Objective     Vital Signs  Temp:  [97.3 °F (36.3 °C)-98.6 °F (37 °C)] 98.2 °F (36.8 °C)  Heart Rate:  [64-94] 94  Resp:  [16-18] 16  BP: ()/(52-68) 95/55    Flowsheet Rows      First Filed Value   Admission Height  170.2 cm (67\") Documented at 08/01/2020 1247   Admission Weight  71.7 kg (158 lb 1.6 oz) Documented at 08/01/2020 1210          I/O this shift:  In: 720 [P.O.:720]  Out: -   I/O last 3 completed shifts:  In: 1230 [P.O.:480; I.V.:200; Blood:550]  Out: 2400 [Other:2400]    Intake/Output Summary (Last 24 hours) at 8/12/2020 1438  Last data filed at 8/12/2020 0856  Gross per 24 hour   Intake 1470 ml   Output --   Net 1470 ml       Physical Exam:  General Appearance: Sitting up in chair.  Calm, pleasant.  Skin: warm and dry  HEENT: Pupils round reactive to light, nonicteric sclerae  Neck: No JVD.  Lungs: Clear to auscultation, no wheezing .  Heart: RRR, normal S1 and S2, no S3, no rub  Abdomen: soft, nontender,+ bs.  Extremities:RUE AVF patent . " 1+ lower ext edema.           Results Review:    Results from last 7 days   Lab Units 08/12/20  0540 08/11/20  1555 08/10/20  1319   SODIUM mmol/L 138 139 138   POTASSIUM mmol/L 4.9 3.9 4.7   CHLORIDE mmol/L 101 100 98   CO2 mmol/L 30.7* 26.3 27.1   BUN mg/dL 48* 34* 60*   CREATININE mg/dL 4.38* 3.39* 6.04*   CALCIUM mg/dL 8.1* 8.1* 8.0*   GLUCOSE mg/dL 211* 156* 207*       Estimated Creatinine Clearance: 15.8 mL/min (A) (by C-G formula based on SCr of 4.38 mg/dL (H)).    Results from last 7 days   Lab Units 08/10/20  1319 08/09/20 0447 08/08/20  0427   MAGNESIUM mg/dL  --   --  2.1   PHOSPHORUS mg/dL 3.7 4.6* 4.8*             Results from last 7 days   Lab Units 08/12/20  0540 08/11/20  1554 08/10/20  1319 08/09/20  0447 08/08/20  0427   WBC 10*3/mm3 7.87 6.67 5.11 5.75 6.04   HEMOGLOBIN g/dL 7.6* 6.5* 7.3* 7.9* 8.5*   PLATELETS 10*3/mm3 117* 109*  107* 106* 99* 97*               Imaging Results (Last 24 Hours)     ** No results found for the last 24 hours. **          aspirin 81 mg Oral Daily   bisacodyl 10 mg Rectal Daily   folic acid 1 mg Oral Daily   insulin lispro 0-7 Units Subcutaneous TID AC   [START ON 8/13/2020] OLANZapine zydis 10 mg Oral Daily   OLANZapine zydis 20 mg Oral Nightly   sodium chloride 10 mL Intravenous Q12H          Medication Review:   Current Facility-Administered Medications   Medication Dose Route Frequency Provider Last Rate Last Dose   • acetaminophen (TYLENOL) tablet 650 mg  650 mg Oral Q4H PRN Rainer Stevenson MD   650 mg at 08/11/20 0840    Or   • acetaminophen (TYLENOL) 160 MG/5ML solution 650 mg  650 mg Oral Q4H PRN Rainer Stevenson MD        Or   • acetaminophen (TYLENOL) suppository 650 mg  650 mg Rectal Q4H PRN Rainer Stevenson MD       • albumin human 25 % IV SOLN 25 g  25 g Intravenous 4x Daily PRN Connor Vazquez MD       • aspirin chewable tablet 81 mg  81 mg Oral Daily Janet Garrett MD   81 mg at 08/12/20 0912   • bisacodyl (DULCOLAX) suppository 10 mg  10 mg Rectal  Daily Hung Rosenberg MD   10 mg at 08/12/20 0912   • dextrose (D50W) 25 g/ 50mL Intravenous Solution 25 g  25 g Intravenous Q15 Min PRN Vince Otto MD       • dextrose (GLUTOSE) oral gel 15 g  15 g Oral Q15 Min PRN Vince Otto MD       • folic acid (FOLVITE) tablet 1 mg  1 mg Oral Daily Luis F Zapata MD       • glucagon (human recombinant) (GLUCAGEN DIAGNOSTIC) injection 1 mg  1 mg Subcutaneous Q15 Min PRN Vince Otto MD       • insulin lispro (humaLOG) injection 0-7 Units  0-7 Units Subcutaneous TID AC Vince Otto MD   2 Units at 08/12/20 1135   • nitroglycerin (NITROSTAT) ointment 0.5 inch  0.5 inch Topical Q6H PRN Janet Garrett MD       • nitroglycerin (NITROSTAT) SL tablet 0.4 mg  0.4 mg Sublingual Q5 Min PRN Rainer Stevenson MD   0.4 mg at 08/08/20 0447   • [START ON 8/13/2020] OLANZapine zydis (ZyPREXA) disintegrating tablet 10 mg  10 mg Oral Daily Luis F Myers III, MD       • OLANZapine zydis (ZyPREXA) disintegrating tablet 20 mg  20 mg Oral Nightly Luis F Myers III, MD       • ondansetron (ZOFRAN) injection 4 mg  4 mg Intravenous Q6H PRN Rainer Stevenson MD   4 mg at 08/09/20 0644   • sodium chloride 0.9 % flush 10 mL  10 mL Intravenous PRN Rainer Stevenson MD       • sodium chloride 0.9 % flush 10 mL  10 mL Intravenous Q12H Rainer Stevenson MD   10 mL at 08/12/20 0900   • sodium chloride 0.9 % flush 10 mL  10 mL Intravenous PRN Rainer Stevenson MD           Assessment/Plan   1.  ESRD.  Dialysis tomorrow.  Remove volume.      2.  Altered mental state.  Psych note reviewed. ZYprexa increased.   3.  Anemia and thrombocytopenia.  Hg better, but still low despite one unit PRBC.  Subq procrit 20K yesterday.  Heme positive. Agree with Dr. Rosenberg that endoscopy would be high risk with recent NSTEMI, lack of cooperation.   4.  Diabetes mellitus type 2  5.  Pyuria.  No growth on urine culture.   6. TCP, chronic.    Plan:  1. Dialysis tomorrow.  If patient agitated in  am, will need to dialyze in his room rather than the dialysis unit as he is very disruptive to the other patients when agitated .  2. PRBC with dialysis tomorrow. 2 units.       Jasmin Dooley MD  08/12/20  14:38      Electronically signed by Jasmin Dooley MD at 08/12/20 1447           All medication doses during the admission are shown, including meds that are no longer on order.   Scheduled Meds Sorted by Name   for Lucien Avalos as of 8/13/20 through 8/13/20     1 Day 3 Days 7 Days 10 Days < Today >    Legend:                          Inactive     Active     Other Encounter    Linked               Medications 08/13/20   aspirin chewable tablet 81 mg   Dose: 81 mg  Freq: Daily Route: PO  Start: 08/08/20 0900    Admin Instructions:   Herbal/drug interaction: Avoid use with ginkgo biloba.  Do not exceed 4 grams of aspirin in a 24 hr period.    If given for pain, use the following pain scale:   Mild Pain = Pain Score of 1-3, CPOT 1-2  Moderate Pain = Pain Score of 4-6, CPOT 3-4  Severe Pain = Pain Score of 7-10, CPOT 5-8    0917                          bisacodyl (DULCOLAX) suppository 10 mg   Dose: 10 mg  Freq: Daily Route: RE  Start: 08/11/20 1000    (0919)                          cefTRIAXone (ROCEPHIN) IVPB 1 g   Dose: 1 g  Freq: Every 24 Hours Route: IV  Indications of Use: URINARY TRACT INFECTION  Last Dose: 1 g (08/03/20 2202)  Start: 08/01/20 2100 End: 08/04/20 1401    Admin Instructions:   Caution: Look alike/sound alike drug alert. Refrigerate       cefTRIAXone (ROCEPHIN) IVPB 1 g   Dose: 1 g  Freq: Once Route: IV  Indications of Use: URINARY TRACT INFECTION  Last Dose: Stopped (08/01/20 1832)  Start: 08/01/20 1707 End: 08/01/20 1832    Admin Instructions:   Caution: Look alike/sound alike drug alert. Refrigerate       epoetin lex-epbx (RETACRIT) injection 20,000 Units   Dose: 20,000 Units  Freq: Once Route: SC  Indications of Use: ESRD ON DIALYSIS  Start: 08/11/20 1445 End: 08/11/20 1608        folic acid (FOLVITE) tablet 1 mg   Dose: 1 mg  Freq: Daily Route: PO  Start: 08/12/20 1400    0917                          heparin (porcine) injection 4,000 Units   Dose: 4,000 Units  Freq: Once Route: IK  Indications of Use: Other - full anticoagulation  Indications Comment: cvc dwell  Start: 08/08/20 1930 End: 08/08/20 1924       insulin lispro (humaLOG) injection 0-7 Units   Dose: 0-7 Units  Freq: 3 Times Daily Before Meals Route: SC  Start: 08/05/20 0915    Admin Instructions:   Correction - Low Dose.  Less than 40 units/day total insulin dose or lean, elderly, renal patients    Blood glucose 150-199 mg/dL - 2 units  Blood glucose 200-249 mg/dL - 3 units  Blood glucose 250-299 mg/dL - 4 units  Blood glucose 300-349 mg/dL - 5 units  Blood glucose 350-400 mg/dL - 6 units  Blood glucose greater than 400 mg/dL - 7 units and call provider   Caution: Look alike/sound alike drug alert    0915    1150 Incomplete   1730                      iopamidol (ISOVUE-300) 61 % injection 150 mL   Dose: 150 mL  Freq: Once in Imaging Route: IV  Start: 08/01/20 1226 End: 08/01/20 1226       levETIRAcetam in NaCl 0.75% (KEPPRA) IVPB 1,000 mg   Dose: 1,000 mg  Freq: Once Route: IV  Last Dose: Stopped (08/01/20 1401)  Start: 08/01/20 1321 End: 08/01/20 1401    Admin Instructions:   Caution: Look alike/sound alike drug alert       morphine injection 2 mg   Dose: 2 mg  Freq: Once Route: IV  Start: 08/08/20 0600 End: 08/08/20 0515    Admin Instructions:   If given for pain, use the following pain scale:  Mild Pain = Pain Score of 1-3, CPOT 1-2  Moderate Pain = Pain Score of 4-6, CPOT 3-4  Severe Pain = Pain Score of 7-10, CPOT 5-8       OLANZapine zydis (ZyPREXA) disintegrating tablet 10 mg   Dose: 10 mg  Freq: Daily Route: PO  Start: 08/13/20 0900    Admin Instructions:   Caution: Look alike/sound alike drug alert    0919                          OLANZapine zydis (ZyPREXA) disintegrating tablet 10 mg   Dose: 10 mg  Freq: 2 times daily  Route: PO  Start: 08/06/20 2100 End: 08/12/20 1134    Admin Instructions:   Caution: Look alike/sound alike drug alert       OLANZapine zydis (ZyPREXA) disintegrating tablet 10 mg   Dose: 10 mg  Freq: Nightly Route: PO  Start: 08/05/20 2100 End: 08/06/20 1133    Admin Instructions:   Caution: Look alike/sound alike drug alert       OLANZapine zydis (ZyPREXA) disintegrating tablet 20 mg   Dose: 20 mg  Freq: Nightly Route: PO  Start: 08/12/20 2100    Admin Instructions:   Caution: Look alike/sound alike drug alert    2100                          sodium chloride 0.9 % flush 10 mL   Dose: 10 mL  Freq: Every 12 Hours Scheduled Route: IV  Start: 08/01/20 2100 0945 2100                        Medications 08/13/20       Continuous Meds Sorted by Name   for Lucien Avalos as of 8/13/20 through 8/13/20    Legend:                          Inactive     Active     Other Encounter    Linked               Medications 08/13/20   dextrose 5 % and sodium chloride 0.45 % infusion   Rate: 100 mL/hr Dose: 100 mL/hr  Freq: Continuous Route: IV  Last Dose: Stopped (08/02/20 1515)  Start: 08/01/20 2015 End: 08/02/20 1502           PRN Meds Sorted by Name   for Lucien Avalos as of 8/13/20 through 8/13/20    Legend:                          Inactive     Active     Other Encounter    Linked               Medications 08/13/20   acetaminophen (TYLENOL) tablet 650 mg   Dose: 650 mg  Freq: Every 4 Hours PRN Route: PO  PRN Reason: Mild Pain   Start: 08/01/20 1913    Admin Instructions:   Do not exceed 4 grams of acetaminophen in a 24 hr period.    If given for pain, use the following pain scale:   Mild Pain = Pain Score of 1-3, CPOT 1-2  Moderate Pain = Pain Score of 4-6, CPOT 3-4  Severe Pain = Pain Score of 7-10, CPOT 5-8       Or  acetaminophen (TYLENOL) 160 MG/5ML solution 650 mg   Dose: 650 mg  Freq: Every 4 Hours PRN Route: PO  PRN Reason: Mild Pain   Start: 08/01/20 1913    Admin Instructions:   Do not exceed 4 grams of  acetaminophen in a 24 hr period.    If given for pain, use the following pain scale:   Mild Pain = Pain Score of 1-3, CPOT 1-2  Moderate Pain = Pain Score of 4-6, CPOT 3-4  Severe Pain = Pain Score of 7-10, CPOT 5-8       Or  acetaminophen (TYLENOL) suppository 650 mg   Dose: 650 mg  Freq: Every 4 Hours PRN Route: RE  PRN Reason: Mild Pain   Start: 08/01/20 1913    Admin Instructions:   Do not exceed 4 grams of acetaminophen in a 24 hr period.    If given for pain, use the following pain scale:   Mild Pain = Pain Score of 1-3, CPOT 1-2  Moderate Pain = Pain Score of 4-6, CPOT 3-4  Severe Pain = Pain Score of 7-10, CPOT 5-8       albumin human 25 % IV SOLN 12.5 g   Dose: 12.5 g  Freq: As Needed Route: IV  PRN Comment: Hypotension During Dialysis  Indications of Use: HEMODIALYSIS PROCEDURE  Start: 08/10/20 1414 End: 08/11/20 2359    Admin Instructions:   Maintain SBP Greater Than 100 During Dialysis. May repeat x 3 doses (4 doses total)       albumin human 25 % IV SOLN 12.5 g   Dose: 12.5 g  Freq: As Needed Route: IV  PRN Comment: Hypotension During Dialysis  Indications of Use: HEMODIALYSIS PROCEDURE  Start: 08/08/20 0138 End: 08/08/20 2359    Admin Instructions:   Maintain SBP Greater Than 100 During Dialysis. May repeat x 3 doses (4 doses total)       albumin human 25 % IV SOLN 12.5 g   Dose: 12.5 g  Freq: As Needed Route: IV  PRN Comment: Hypotension During Dialysis  Indications of Use: HEMODIALYSIS PROCEDURE  Start: 08/06/20 0734 End: 08/06/20 2359    Admin Instructions:   Maintain SBP Greater Than 100 During Dialysis. May repeat x 3 doses (4 doses total)       albumin human 25 % IV SOLN 12.5 g   Dose: 12.5 g  Freq: As Needed Route: IV  PRN Comment: Hypotension During Dialysis  Indications of Use: HEMODIALYSIS PROCEDURE  Start: 08/06/20 0734 End: 08/06/20 2359    Admin Instructions:   Maintain SBP Greater Than 100 During Dialysis. May repeat x 3 doses (4 doses total)       albumin human 25 % IV SOLN 25 g   Dose:  25 g  Freq: 4 Times Daily PRN Route: IV  PRN Comment: intradialytic hypotension  Indications of Use: HEMODIALYSIS PROCEDURE  Start: 08/04/20 1843       dextrose (D50W) 25 g/ 50mL Intravenous Solution 25 g   Dose: 25 g  Freq: Every 15 Minutes PRN Route: IV  PRN Reason: Low Blood Sugar  PRN Comment: Blood Sugar Less Than 70  Start: 08/05/20 0827    Admin Instructions:   Blood sugar less than 70; patient has IV access - Unresponsive, NPO or Unable To Safely Swallow       dextrose (GLUTOSE) oral gel 15 g   Dose: 15 g  Freq: Every 15 Minutes PRN Route: PO  PRN Reason: Low Blood Sugar  PRN Comment: Blood sugar less than 70  Start: 08/05/20 0827    Admin Instructions:   BS<70, Patient Alert, Is not NPO, Can safely swallow.       glucagon (human recombinant) (GLUCAGEN DIAGNOSTIC) injection 1 mg   Dose: 1 mg  Freq: Every 15 Minutes PRN Route: SC  PRN Reason: Low Blood Sugar  PRN Comment: Blood Glucose Less Than 70  Start: 08/05/20 0827    Admin Instructions:   Blood Glucose Less Than 70 - Patient Without IV Access - Unresponsive, NPO or Unable To Safely Swallow       haloperidol lactate (HALDOL) injection 2 mg   Dose: 2 mg  Freq: Every 2 Hours PRN Route: IV  PRN Reason: Agitation  Start: 08/02/20 1505 End: 08/03/20 1142    Admin Instructions:   For IV Push, administer no faster than 5 mg / minute.       LORazepam (ATIVAN) injection 1 mg   Dose: 1 mg  Freq: Every 2 Hours PRN Route: IV  PRN Reasons: Anxiety,Agitation  PRN Comment: yelling, cursing, threatening violence  Start: 08/02/20 1505 End: 08/03/20 1142    Admin Instructions:   Caution: Look alike/sound alike drug alert       nitroglycerin (NITROSTAT) ointment 0.5 inch   Dose: 0.5 inch  Freq: Every 6 Hours PRN Route: TOP  PRN Reason: Chest Pain  Start: 08/08/20 0552    Admin Instructions:   Apply to chest wall. If scheduled every 6 hours, omit the midnight dose to allow for nitrate free interval.       nitroglycerin (NITROSTAT) SL tablet 0.4 mg   Dose: 0.4 mg  Freq:  Every 5 Minutes PRN Route: SL  PRN Reason: Chest Pain  PRN Comment: Only if SBP Greater Than 100  Start: 08/01/20 1911    Admin Instructions:   If Pain Unrelieved After 3 Doses Notify MD       OLANZapine (zyPREXA) injection 10 mg   Dose: 10 mg  Freq: Every 8 Hours PRN Route: IM  PRN Reason: Agitation  Start: 08/04/20 1000 End: 08/05/20 1310    Admin Instructions:   Dissolve the contents of the vial using 2.1 mL of Sterile Water for Injection for approximate concentration of 5 mg/mL. Caution: Look alike/sound alike drug alert.       OLANZapine (zyPREXA) injection 5 mg   Dose: 5 mg  Freq: Every 8 Hours PRN Route: IM  PRN Reason: Agitation  Start: 08/03/20 1142 End: 08/04/20 0936    Admin Instructions:   Dissolve the contents of the vial using 2.1 mL of Sterile Water for Injection for approximate concentration of 5 mg/mL. Caution: Look alike/sound alike drug alert.       ondansetron (ZOFRAN) injection 4 mg   Dose: 4 mg  Freq: Every 6 Hours PRN Route: IV  PRN Reasons: Nausea,Vomiting  Start: 08/01/20 1914    Admin Instructions:   If BOTH ondansetron (ZOFRAN) and promethazine (PHENERGAN) are ordered use ondansetron first and THEN promethazine IF ondansetron is ineffective.       polyethylene glycol (MIRALAX) packet 17 g   Dose: 17 g  Freq: 2 Times Daily PRN Route: PO  PRN Reason: Constipation  Start: 08/12/20 1451    Admin Instructions:   Use 4-8 ounces of water, tea, or juice for each 17 gram dose.       sodium chloride 0.9 % bolus 1,000 mL   Dose: 1,000 mL  Freq: As Needed Route: IV  PRN Comment: to maintain SBP > 100 mmHG  Start: 08/08/20 0138 End: 08/09/20 0137    Admin Instructions:   For dialysis. Not to exceed 1000 mL.       sodium chloride 0.9 % bolus 1,000 mL   Dose: 1,000 mL  Freq: As Needed Route: IV  PRN Comment: to maintain SBP > 100 mmHG  Start: 08/06/20 0734 End: 08/07/20 0733    Admin Instructions:   For dialysis. Not to exceed 1000 mL.       sodium chloride 0.9 % bolus 1,000 mL   Dose: 1,000 mL  Freq: As  Needed Route: IV  PRN Comment: to maintain SBP > 100 mmHG  Start: 08/06/20 0734 End: 08/07/20 0733    Admin Instructions:   For dialysis. Not to exceed 1000 mL.       sodium chloride 0.9 % flush 10 mL   Dose: 10 mL  Freq: As Needed Route: IV  PRN Reason: Line Care  Start: 08/01/20 1911       sodium chloride 0.9 % flush 10 mL   Dose: 10 mL  Freq: As Needed Route: IV  PRN Reason: Line Care  Start: 08/01/20 1213       Medications 08/13/20

## 2020-08-13 NOTE — PROGRESS NOTES
[facility]               ASSOCIATES     LOS: 12 days     Name: Lucien Avalos  Age: 75 y.o.  Sex: male  :  1945  MRN: [id number]         Primary Care Physician: Matilde Hi MD    Diet Regular; Cardiac    Subjective   Patient seen at bedside.    Objective   Temp:  [97.2 °F (36.2 °C)-97.8 °F (36.6 °C)] 97.8 °F (36.6 °C)  Heart Rate:  [94-95] 94  Resp:  [18] 18  BP: ()/(45-64) 93/47  SpO2:  [97 %-99 %] 99 %  on  Flow (L/min):  [2-3] 2;   Device (Oxygen Therapy): nasal cannula  Body mass index is 25.65 kg/m².    Physical Exam    General: patient is awake and alert.  Head and ENT: normocephalic and atraumatic.  Lungs: symmetric expansion and equal air entry bilaterally.  Heart: regular rate, rhythm, no murmurs.  Abdomen: soft, nontender, bowel sounds present.  Extremities:  No clubbing, cyanosis or edema.  Neurologic:  No focal neurological deficits present.  Cranial nerves intact.  Psychiatry:  Normal mood and affect.  Skin:  Warm and no rash.    Reviewed medications and new clinical results        Results from last 7 days   Lab Units 20  0935 20  0540 20  1554 08/10/20  1319 207 20  0427   WBC 10*3/mm3 10.38 7.87 6.67 5.11 5.75 6.04   HEMOGLOBIN g/dL 7.0* 7.6* 6.5* 7.3* 7.9* 8.5*   PLATELETS 10*3/mm3 130* 117* 109*  107* 106* 99* 97*     Results from last 7 days   Lab Units 20  0934 20  0540 20  1555 08/10/20  1319 20  0447 20  0427   SODIUM mmol/L 136 138 139 138 136 140   POTASSIUM mmol/L 5.1 4.9 3.9 4.7 4.6 4.9   CHLORIDE mmol/L 98 101 100 98 100 102   CO2 mmol/L 24.5 30.7* 26.3 27.1 29.3* 24.6   BUN mg/dL 80* 48* 34* 60* 28* 58*   CREATININE mg/dL 6.26* 4.38* 3.39* 6.04* 3.97* 6.99*   CALCIUM mg/dL 8.3* 8.1* 8.1* 8.0* 8.1* 8.0*   GLUCOSE mg/dL 166* 211* 156* 207* 151* 190*     Lab Results   Component Value Date    ANIONGAP 13.5 2020     Glucose   Date/Time Value Ref Range Status      08/13/2020 1603 238 (H) 70 - 130 mg/dL Final   08/13/2020 1057 208 (H) 70 - 130 mg/dL Final   08/13/2020 0906 192 (H) 70 - 130 mg/dL Final   08/12/2020 2151 196 (H) 70 - 130 mg/dL Final   08/12/2020 1713 230 (H) 70 - 130 mg/dL Final   08/12/2020 1116 198 (H) 70 - 130 mg/dL Final   08/12/2020 0632 189 (H) 70 - 130 mg/dL Final   08/11/2020 2050 219 (H) 70 - 130 mg/dL Final     No results found for: HGBA1C  Estimated Creatinine Clearance: 11 mL/min (A) (by C-G formula based on SCr of 6.26 mg/dL (H)).    Lab Results   Component Value Date    COVID19 Not Detected 08/01/2020     Assessment/Plan   Active Hospital Problems    Diagnosis  POA   • **Unresponsive [R41.89]  Unknown   • Acute metabolic encephalopathy [G93.41]  Yes   • COPD (chronic obstructive pulmonary disease) (CMS/[facility]) [J44.9]  Yes   • Diabetes mellitus (CMS/[facility]) [E11.9]  Yes   • Hypertension [I10]  Unknown   • ESRD (end stage renal disease) (CMS/[facility]) [N18.6]  Yes   • UTI (urinary tract infection) [N39.0]  Unknown   • Elevated troponin [R79.89]  Unknown   • Anemia due to chronic kidney disease [N18.9, D63.1]  Unknown      Resolved Hospital Problems   No resolved problems to display.     75 y.o. male      Assessment and plan  1. Acute metabolic encephalopathy,  Patient is at baseline mental status, palliative evaluation in progress.  Patient does not want to continue hemodialysis.  2.  Increased agitation, monitor for safety.  Appears to be close to baseline mental status.  3.  Urinary tract infection, complete antibiotic course.  4.  End-stage renal disease,  Patient does not want to continue.  5.  COPD, chronic and stable.  6. Patient will benefit from inpatient hospice.       Vince Otto MD   08/13/20  18:34

## 2020-08-13 NOTE — PROGRESS NOTES
"   LOS: 12 days    Patient Care Team:  PersonMatilde MD as PCP - General (Internal Medicine)    Chief Complaint:    Chief Complaint   Patient presents with   • Altered Mental Status   • Hypotension     Follow-up ESRD  Subjective     Interval History:   Tells me he just wants to be put to sleep. He is tired of all of this.  When I ask specifically if he wants to stop dialysis, he says, \" no, I will accept dialysis.\"   Slept well last night with increased dose of Zyprexa.   Review of Systems:   Not appropriate.    Objective     Vital Signs  Temp:  [97.2 °F (36.2 °C)-98.2 °F (36.8 °C)] 97.2 °F (36.2 °C)  Heart Rate:  [94-95] 95  Resp:  [16-18] 18  BP: ()/(45-64) 106/64    Flowsheet Rows      First Filed Value   Admission Height  170.2 cm (67\") Documented at 08/01/2020 1247   Admission Weight  71.7 kg (158 lb 1.6 oz) Documented at 08/01/2020 1210          I/O this shift:  In: 360 [P.O.:360]  Out: -   I/O last 3 completed shifts:  In: 1530 [P.O.:1080; I.V.:200; Blood:250]  Out: -     Intake/Output Summary (Last 24 hours) at 8/13/2020 1139  Last data filed at 8/13/2020 0920  Gross per 24 hour   Intake 720 ml   Output --   Net 720 ml       Physical Exam:  General Appearance: Sitting up in chair.  Calm, pleasant.  Skin: warm and dry  HEENT: Pupils round reactive to light, nonicteric sclerae  Neck: No JVD.  Lungs: Clear to auscultation, no wheezing .  Heart: RRR, normal S1 and S2, no S3, no rub  Abdomen: soft, nontender,+ bs.  Extremities:RUE AVF patent . 1+ lower ext edema.           Results Review:    Results from last 7 days   Lab Units 08/13/20  0934 08/12/20  0540 08/11/20  1555   SODIUM mmol/L 136 138 139   POTASSIUM mmol/L 5.1 4.9 3.9   CHLORIDE mmol/L 98 101 100   CO2 mmol/L 24.5 30.7* 26.3   BUN mg/dL 80* 48* 34*   CREATININE mg/dL 6.26* 4.38* 3.39*   CALCIUM mg/dL 8.3* 8.1* 8.1*   GLUCOSE mg/dL 166* 211* 156*       Estimated Creatinine Clearance: 11 mL/min (A) (by C-G formula based on SCr of 6.26 mg/dL " (H)).    Results from last 7 days   Lab Units 08/10/20  1319 08/09/20  0447 08/08/20  0427   MAGNESIUM mg/dL  --   --  2.1   PHOSPHORUS mg/dL 3.7 4.6* 4.8*             Results from last 7 days   Lab Units 08/13/20  0935 08/12/20  0540 08/11/20  1554 08/10/20  1319 08/09/20  0447   WBC 10*3/mm3 10.38 7.87 6.67 5.11 5.75   HEMOGLOBIN g/dL 7.0* 7.6* 6.5* 7.3* 7.9*   PLATELETS 10*3/mm3 130* 117* 109*  107* 106* 99*               Imaging Results (Last 24 Hours)     ** No results found for the last 24 hours. **          aspirin 81 mg Oral Daily   bisacodyl 10 mg Rectal Daily   folic acid 1 mg Oral Daily   insulin lispro 0-7 Units Subcutaneous TID AC   OLANZapine zydis 10 mg Oral Daily   OLANZapine zydis 20 mg Oral Nightly   sodium chloride 10 mL Intravenous Q12H          Medication Review:   Current Facility-Administered Medications   Medication Dose Route Frequency Provider Last Rate Last Dose   • acetaminophen (TYLENOL) tablet 650 mg  650 mg Oral Q4H PRN Rainer Stevenson MD   650 mg at 08/11/20 0840    Or   • acetaminophen (TYLENOL) 160 MG/5ML solution 650 mg  650 mg Oral Q4H PRN Rainer Stevenson MD        Or   • acetaminophen (TYLENOL) suppository 650 mg  650 mg Rectal Q4H PRN Rainer Stevenson MD       • albumin human 25 % IV SOLN 25 g  25 g Intravenous 4x Daily PRN Connor Vazquez MD       • aspirin chewable tablet 81 mg  81 mg Oral Daily Janet Garrett MD   81 mg at 08/13/20 0917   • bisacodyl (DULCOLAX) suppository 10 mg  10 mg Rectal Daily Hung Rosenberg MD   10 mg at 08/12/20 0912   • dextrose (D50W) 25 g/ 50mL Intravenous Solution 25 g  25 g Intravenous Q15 Min PRN Vince Otto MD       • dextrose (GLUTOSE) oral gel 15 g  15 g Oral Q15 Min PRN Vince Otto MD       • folic acid (FOLVITE) tablet 1 mg  1 mg Oral Daily Luis F Zapata MD   1 mg at 08/13/20 0917   • glucagon (human recombinant) (GLUCAGEN DIAGNOSTIC) injection 1 mg  1 mg Subcutaneous Q15 Min PRN Vince Otto MD       • insulin  lispro (humaLOG) injection 0-7 Units  0-7 Units Subcutaneous TID AC Vince Otto MD   2 Units at 08/13/20 0915   • nitroglycerin (NITROSTAT) ointment 0.5 inch  0.5 inch Topical Q6H PRN Janet Garrett MD       • nitroglycerin (NITROSTAT) SL tablet 0.4 mg  0.4 mg Sublingual Q5 Min PRN Rainer Stevenson MD   0.4 mg at 08/08/20 0447   • OLANZapine zydis (ZyPREXA) disintegrating tablet 10 mg  10 mg Oral Daily Luis F Myers III, MD   10 mg at 08/13/20 0919   • OLANZapine zydis (ZyPREXA) disintegrating tablet 20 mg  20 mg Oral Nightly Luis F Myers III, MD   20 mg at 08/12/20 2004   • ondansetron (ZOFRAN) injection 4 mg  4 mg Intravenous Q6H PRN Rainer Stevenson MD   4 mg at 08/09/20 0644   • polyethylene glycol (MIRALAX) packet 17 g  17 g Oral BID PRN Janet Bro APRN       • sodium chloride 0.9 % flush 10 mL  10 mL Intravenous PRN Rainer Stevenson MD       • sodium chloride 0.9 % flush 10 mL  10 mL Intravenous Q12H Rainer Stevenson MD   10 mL at 08/13/20 0945   • sodium chloride 0.9 % flush 10 mL  10 mL Intravenous PRN Rainer Stevenson MD           Assessment/Plan   1.  ESRD.  Dialysis today.       2.  Altered mental state.  Psych note reviewed. ZYprexa increased.   3.  Anemia and thrombocytopenia.  Hg 7  Subq procrit 20K yesterday.  Heme positive. Agree with Dr. Rosenberg that endoscopy would be too high risk with recent NSTEMI, lack of cooperation. 2 units PRBC with HD today.   4.  Diabetes mellitus type 2  5.  Pyuria.  No growth on urine culture.   6. TCP, chronic.    Plan:  1. Dialysis today.  Will need to dialyze in his room rather than the dialysis unit as he is very disruptive to the other patients when agitated .  2. PRBC with dialysis today 2 units.       Jasmin Dooley MD  08/13/20  11:39

## 2020-08-13 NOTE — PLAN OF CARE
Patient has been increasingly agitated throughout shift, and inappropriate with female staff.  Yelling out into chamberlain frequently.  Patient was supposed to have dialysis at bedside, but when RN from dialysis came to do it.  Patient refused.  After consulting with POA and patient, patient is to be placed on comfort measures only.  Nephrology notified of declination of dialysis.  Palliative care orders placed.  Supposed to go to room P488.  Patient still complains of pain in abdomen and nausea.  4mg of zofran given and 2 mg of morphine given.  VSS.  Will continue to monitor.   Problem: Patient Care Overview  Goal: Plan of Care Review  Outcome: Ongoing (interventions implemented as appropriate)  Goal: Individualization and Mutuality  Outcome: Ongoing (interventions implemented as appropriate)  Goal: Discharge Needs Assessment  Outcome: Ongoing (interventions implemented as appropriate)  Goal: Interprofessional Rounds/Family Conf  Outcome: Ongoing (interventions implemented as appropriate)     Problem: Fall Risk (Adult)  Goal: Absence of Fall  Outcome: Ongoing (interventions implemented as appropriate)     Problem: Skin Injury Risk (Adult)  Goal: Skin Health and Integrity  Outcome: Ongoing (interventions implemented as appropriate)     Problem: Suicide Risk (Adult)  Goal: Strength-Based Wellness/Recovery  Outcome: Ongoing (interventions implemented as appropriate)  Goal: Physical Safety  Outcome: Ongoing (interventions implemented as appropriate)     Problem: Emotion/Temperament Impairment (Disruptive Behavior) (Adult)  Goal: Improved Emotion/Temperament/Mood Symptoms (Disruptive Behavior)  Outcome: Ongoing (interventions implemented as appropriate)     Problem: Behavioral Regulation Impairment (Disruptive Behavior) (Adult)  Goal: Improved Impulse/Aggression Control (Disruptive Behavior)  Outcome: Ongoing (interventions implemented as appropriate)     Problem: Sleep Impairment (Disruptive Behavior) (Adult)  Goal: Improved  Sleep Hygiene (Disruptive Behavior)  Outcome: Ongoing (interventions implemented as appropriate)

## 2020-08-14 NOTE — CONSULTS
Kaylin sister contacted over phone. Kaylin is in agreement with HSB and consents faxed and signed and faxed back. Kaylin would like to be notified when FH comes to  patient's body. Patient is eligible for HSB per Neftaly BINGHAM. Spoke to Brynn WEBBER who spoke to MD who approved HSB today. Thank you for allowing us to participate in Mr. De Oliveira care.     Thank you for the referral.    Brynn Pérez RN  [facility] [place]  [phone]

## 2020-08-14 NOTE — DISCHARGE SUMMARY
[facility]               ASSOCIATES    Date of Discharge:  8/14/2020    PCP: Matilde Hi MD    Discharge Diagnosis:   Active Hospital Problems    Diagnosis  POA   • **Unresponsive [R41.89]  Unknown   • Acute metabolic encephalopathy [G93.41]  Yes   • COPD (chronic obstructive pulmonary disease) (CMS/[facility]) [J44.9]  Yes   • Diabetes mellitus (CMS/[facility]) [E11.9]  Yes   • Hypertension [I10]  Unknown   • ESRD (end stage renal disease) (CMS/[facility]) [N18.6]  Yes   • UTI (urinary tract infection) [N39.0]  Unknown   • Elevated troponin [R79.89]  Unknown   • Anemia due to chronic kidney disease [N18.9, D63.1]  Unknown      Resolved Hospital Problems   No resolved problems to display.     Procedures Performed       Consults     Date and Time Order Name Status Description    8/11/2020 1504 Inpatient Psychiatrist Consult Completed     8/10/2020 1617 Hematology & Oncology Inpatient Consult Completed     8/10/2020 1209 Inpatient Gastroenterology Consult Completed     8/8/2020 0520 Inpatient Cardiology Consult      8/5/2020 1325 Inpatient Psychiatrist Consult Completed     8/3/2020 0030 Inpatient Psychiatrist Consult Completed     8/1/2020 2125 Inpatient Neurology Consult General Completed     8/1/2020 1915 Inpatient Nephrology Consult Completed     8/1/2020 1557 [facility] (on-call MD unless specified) Details Completed     8/1/2020 1213 Inpatient Neurology Consult Stroke Completed     8/1/2020 1213 Inpatient Neurology Consult Stroke Completed         Hospital Course   72 year old male presents to hospital, he has acute metabolic encephalopathy, patient has underlying psychiatric issues.  Patient has had a long hospital stay, he has increased agitation, Psychiatry has evaluated this patient, patient will be transitioned to inpatient hospice care, he has not been doing well, he is also dependent on dialysis, he does not want  Dialysis anymore.    Patient was seen this morning, he has been very poorly, we  will transition him to comfort measures only.  Patient has made decisions for himself to be transitioned to hospice.  Time spent is more than 30 min.    Condition on Discharge: Improved.     Temp:  [97.4 °F (36.3 °C)-98.6 °F (37 °C)] 98.6 °F (37 °C)  Heart Rate:  [57-88] 57  Resp:  [16-18] 16  BP: (64-95)/(31-72) 64/31  Body mass index is 25.65 kg/m².    Physical Exam    General:  He is very lethargic  Head and ENT: normocephalic and atraumatic.  Lungs: symmetric expansion and equal air entry bilaterally.  Heart: regular rate, rhythm, no murmurs.  Abdomen: soft, nontender, bowel sounds present.  Extremities:  No clubbing, cyanosis or edema.  Neurologic:  disoriented  Psychiatry:   disoriented  Skin:  Warm and no rash.       Discharge Medications      New Medications      Instructions Start Date   OLANZapine zydis 10 MG disintegrating tablet  Commonly known as:  ZyPREXA   10 mg, Translingual, 2 times daily         Continue These Medications      Instructions Start Date   albuterol sulfate  (90 Base) MCG/ACT inhaler  Commonly known as:  PROVENTIL HFA;VENTOLIN HFA;PROAIR HFA   2 puffs, Inhalation, Every 6 Hours PRN      atorvastatin 40 MG tablet  Commonly known as:  LIPITOR   40 mg, Oral, Daily      clopidogrel 75 MG tablet  Commonly known as:  PLAVIX   75 mg, Oral, Daily      FLUoxetine 20 MG capsule  Commonly known as:  PROzac   20 mg, Oral, Daily      HYDROcodone-acetaminophen 5-325 MG per tablet  Commonly known as:  NORCO   1 tablet, Oral, Every 6 Hours PRN      hydrOXYzine 50 MG tablet  Commonly known as:  ATARAX   50 mg, Oral, Every 12 Hours      insulin detemir 100 UNIT/ML injection  Commonly known as:  LEVEMIR   30 Units, Subcutaneous, Daily      ipratropium-albuterol  MCG/ACT inhaler  Commonly known as:  COMBIVENT RESPIMAT   2 puffs, Inhalation, Every 8 Hours PRN      losartan 25 MG tablet  Commonly known as:  COZAAR   25 mg, Oral, Daily      midodrine 10 MG tablet  Commonly known as:   PROAMATINE   10 mg, Oral, Daily, Give before dialysis, on dialysis day (Mon, Wed, Fri)      pantoprazole 40 MG EC tablet  Commonly known as:  PROTONIX   40 mg, Oral, 2 times daily      PROMOD PO   30 mL, Oral, Daily      sucralfate 1 g tablet  Commonly known as:  CARAFATE   1 g, Oral, 4 Times Daily      temazepam 15 MG capsule  Commonly known as:  RESTORIL   15 mg, Oral, Nightly      Velphoro 500 MG chewable tablet  Generic drug:  Sucroferric Oxyhydroxide   1 tablet, Oral, 3 Times Daily            Diet Instructions     Diet: Regular; Thin      Discharge Diet:  Regular    Fluid Consistency:  Thin    soft texture, ground         Activity Instructions     Activity as Tolerated           Additional Instructions for the Follow-ups that You Need to Schedule     Discharge Follow-up with PCP   As directed       Currently Documented PCP:    Matilde Hi MD    PCP Phone Number:    [phone]     Follow Up Details:  1-2 weeks            Contact information for follow-up providers     Matilde Hi MD .    Specialty:  Internal Medicine  Why:  1-2 weeks  Contact information:  [address]  [facility] [address] [phone]                   Contact information for after-discharge care     Destination     [facility] .    Service:  Skilled Nursing  Contact information:  [address] [facility] [address] [phone]                           Test Results Pending at Discharge     Vince Otto MD  08/14/20  18:39    Discharge time spent greater than 30 minutes.

## 2020-08-14 NOTE — CONSULTS
Pt asleep, no one else present.  Will ask nurse to notify me when pt is awake and I will assess if pt is able to enact a living will as he requested yesterday.  Chaplain Clifford Lopes

## 2020-08-14 NOTE — PROGRESS NOTES
Gastroenterology   Inpatient Progress Note    Reason for Follow Up:  anemia    Subjective     Interval History:   No complaints.  Not talkative, not combative.  No signs of bleeding from RN    Current Facility-Administered Medications:   •  acetaminophen (TYLENOL) tablet 650 mg, 650 mg, Oral, Q4H PRN, 650 mg at 08/13/20 1445 **OR** acetaminophen (TYLENOL) 160 MG/5ML solution 650 mg, 650 mg, Oral, Q4H PRN **OR** acetaminophen (TYLENOL) suppository 650 mg, 650 mg, Rectal, Q4H PRN, Rainer Stevenson MD  •  acetaminophen (TYLENOL) tablet 650 mg, 650 mg, Oral, Q4H PRN **OR** acetaminophen (TYLENOL) 160 MG/5ML solution 650 mg, 650 mg, Oral, Q4H PRN **OR** acetaminophen (TYLENOL) suppository 650 mg, 650 mg, Rectal, Q4H PRN, Vince Otto MD  •  albumin human 25 % IV SOLN 25 g, 25 g, Intravenous, 4x Daily PRN, Connor Vazquez MD  •  aspirin chewable tablet 81 mg, 81 mg, Oral, Daily, Janet Garrett MD, 81 mg at 08/13/20 0917  •  bisacodyl (DULCOLAX) suppository 10 mg, 10 mg, Rectal, Daily, Hung Rosenberg MD, 10 mg at 08/12/20 0912  •  dextrose (D50W) 25 g/ 50mL Intravenous Solution 25 g, 25 g, Intravenous, Q15 Min PRN, Vince Otto MD  •  dextrose (GLUTOSE) oral gel 15 g, 15 g, Oral, Q15 Min PRN, Vince Otto MD  •  diphenoxylate-atropine (LOMOTIL) 2.5-0.025 MG per tablet 1 tablet, 1 tablet, Oral, Q2H PRN, Vince Otto MD  •  folic acid (FOLVITE) tablet 1 mg, 1 mg, Oral, Daily, Luis F Zapata MD, 1 mg at 08/13/20 0917  •  glucagon (human recombinant) (GLUCAGEN DIAGNOSTIC) injection 1 mg, 1 mg, Subcutaneous, Q15 Min PRN, Vince Otto MD  •  Glycerin-Hypromellose- (ARTIFICIAL TEARS) 0.2-0.2-1 % ophthalmic solution solution 1 drop, 1 drop, Both Eyes, Q30 Min PRN, Vince Otto MD  •  glycopyrrolate PF (ROBINUL) injection 0.2 mg, 0.2 mg, Intravenous, Q2H PRN **OR** glycopyrrolate PF (ROBINUL) injection 0.2 mg, 0.2 mg, Subcutaneous, Q2H PRN **OR** glycopyrrolate PF (ROBINUL)  injection 0.4 mg, 0.4 mg, Intravenous, Q2H PRN **OR** glycopyrrolate PF (ROBINUL) injection 0.4 mg, 0.4 mg, Subcutaneous, Q2H PRN, Vince Otto MD  •  HYDROmorphone (DILAUDID) injection 0.5 mg, 0.5 mg, Intravenous, Q1H PRN, 0.5 mg at 08/14/20 0838 **OR** morphine injection 2 mg, 2 mg, Intravenous, Q1H PRN, 2 mg at 08/13/20 1716 **OR** morphine concentrated solution solution 5 mg, 5 mg, Sublingual, Q1H PRN, Vince Otot MD  •  HYDROmorphone (DILAUDID) injection 1 mg, 1 mg, Intravenous, Q1H PRN **OR** morphine injection 4 mg, 4 mg, Intravenous, Q1H PRN **OR** morphine concentrated solution solution 10 mg, 10 mg, Sublingual, Q1H PRN, Vince Otto MD  •  HYDROmorphone (DILAUDID) injection 1.5 mg, 1.5 mg, Intravenous, Q1H PRN **OR** Morphine sulfate (PF) injection 6 mg, 6 mg, Intravenous, Q1H PRN **OR** morphine concentrated solution solution 20 mg, 20 mg, Sublingual, Q1H PRN, Vince Otto MD  •  insulin lispro (humaLOG) injection 0-7 Units, 0-7 Units, Subcutaneous, TID AC, Vince Otto MD, 3 Units at 08/13/20 1747  •  LORazepam (ATIVAN) injection 0.5 mg, 0.5 mg, Intravenous, Q1H PRN, 0.5 mg at 08/14/20 0838 **OR** LORazepam (ATIVAN) 2 MG/ML concentrated solution 0.5 mg, 0.5 mg, Sublingual, Q1H PRN, Vince Otto MD  •  LORazepam (ATIVAN) injection 1 mg, 1 mg, Intravenous, Q1H PRN **OR** LORazepam (ATIVAN) 2 MG/ML concentrated solution 1 mg, 1 mg, Sublingual, Q1H PRN, Vince Otto MD  •  LORazepam (ATIVAN) injection 2 mg, 2 mg, Intravenous, Q1H PRN **OR** LORazepam (ATIVAN) 2 MG/ML concentrated solution 2 mg, 2 mg, Sublingual, Q1H PRN, Vince Otto MD  •  magic mouthwash oral supsension 5 mL, 5 mL, Swish & Spit, Q4H PRN, Vince Otto MD  •  nitroglycerin (NITROSTAT) ointment 0.5 inch, 0.5 inch, Topical, Q6H PRN, Janet Garrett MD  •  nitroglycerin (NITROSTAT) SL tablet 0.4 mg, 0.4 mg, Sublingual, Q5 Min PRN, Rainer Stevenson MD, 0.4 mg at 08/08/20 0447  •  OLANZapine zydis  (ZyPREXA) disintegrating tablet 10 mg, 10 mg, Oral, Daily, Luis F Myers III, MD, 10 mg at 08/13/20 0919  •  OLANZapine zydis (ZyPREXA) disintegrating tablet 20 mg, 20 mg, Oral, Nightly, Luis F Myers III, MD, Stopped at 08/13/20 2019  •  ondansetron (ZOFRAN) injection 4 mg, 4 mg, Intravenous, Q6H PRN, GrahamRainer gloria MD, 4 mg at 08/13/20 1716  •  polyethylene glycol (MIRALAX) packet 17 g, 17 g, Oral, BID PRN, Janet Bro APRN  •  Scopolamine (TRANSDERM-SCOP) 1.5 MG/3DAYS patch 1 patch, 1 patch, Transdermal, Q72H PRN, Vince Otto MD  •  sodium chloride 0.9 % flush 10 mL, 10 mL, Intravenous, PRN, Rainer Stevenson MD  •  sodium chloride 0.9 % flush 10 mL, 10 mL, Intravenous, Q12H, Rainer Stevenson MD, 10 mL at 08/13/20 2019  •  sodium chloride 0.9 % flush 10 mL, 10 mL, Intravenous, PRN, Rainer Stevenson MD  Review of Systems:               Review of systems could not be obtained due to  patient confusion.    Objective     Vital Signs  Temp:  [97.4 °F (36.3 °C)-97.8 °F (36.6 °C)] 97.4 °F (36.3 °C)  Heart Rate:  [87-94] 88  Resp:  [16-18] 16  BP: (93-95)/(47-72) 95/72  Body mass index is 25.65 kg/m².    Intake/Output Summary (Last 24 hours) at 8/14/2020 0942  Last data filed at 8/14/2020 0550  Gross per 24 hour   Intake 360 ml   Output --   Net 360 ml     No intake/output data recorded.                Physical Exam:              General: patient awake, not communicative              Eyes: no scleral icterus              Skin: warm and dry, not jaundiced              Abdomen: soft, nontender, nondistended; normal bowel sounds, no masses palpated, no periumbical lymphadenopathy              Psychiatric: Inppropriate affect and behavior                Results Review:                I reviewed the patient's new clinical results.    Results from last 7 days   Lab Units 08/13/20  0935 08/12/20  0540 08/11/20  1554   WBC 10*3/mm3 10.38 7.87 6.67   HEMOGLOBIN g/dL 7.0* 7.6* 6.5*   HEMATOCRIT % 23.3*  24.0* 20.0*   PLATELETS 10*3/mm3 130* 117* 109*  107*     Results from last 7 days   Lab Units 08/13/20  0934 08/12/20  0540 08/11/20  1555   SODIUM mmol/L 136 138 139   POTASSIUM mmol/L 5.1 4.9 3.9   CHLORIDE mmol/L 98 101 100   CO2 mmol/L 24.5 30.7* 26.3   BUN mg/dL 80* 48* 34*   CREATININE mg/dL 6.26* 4.38* 3.39*   CALCIUM mg/dL 8.3* 8.1* 8.1*   GLUCOSE mg/dL 166* 211* 156*         Lab Results   Lab Value Date/Time    LIPASE 23 04/03/2020 2355    LIPASE 85 12/19/2019 0925    LIPASE 140 09/01/2019 1645    LIPASE 84 05/09/2019 1317    LIPASE 147 02/25/2019 0558       Radiology:  MRI Brain Without Contrast   Final Result       No acute infarct. No findings to suggest hemorrhage at the midbrain,   although the exam is somewhat limited by motion artifact; the CT density   of concern on the earlier CT may represent vascular anomaly. Follow-up   evaluation with enhanced MRI may be helpful when/if possible, and close   follow-up CT could be obtained to ensure stability of the CT finding of   concern.       This report was finalized on 8/1/2020 3:43 PM by Dr. Jayjay Corbett M.D.          XR Chest 1 View   Final Result   Small likely atelectasis or scarring. Follow-up as   clinically indicated.       This report was finalized on 8/1/2020 1:34 PM by Dr. Jayjay Corbett M.D.          CT Cerebral Perfusion With & Without Contrast   Final Result           1. No perfusion abnormality to suggest completed or threatened acute   infarct.   2. No arterial cutoff. Narrowing at the bilateral ICA cavernous   segments, estimated at 65% on the right, 75% on the left.   3. A 3 to 4 mm hyperdense focus at the mid brain remains indeterminate,   could be a small focus of hemorrhage or vascular abnormality. Close   follow-up/further evaluation recommended.   4. Possible mucosal lesion in the trachea versus lobulated mucus.           The findings were discussed by telephone with Dr. Sanchez at 1235,   1248, 08/01/2020       This  report was finalized on 8/1/2020 12:56 PM by Dr. Jayjay Corbett M.D.          CT Angiogram Neck   Final Result           1. No perfusion abnormality to suggest completed or threatened acute   infarct.   2. No arterial cutoff. Narrowing at the bilateral ICA cavernous   segments, estimated at 65% on the right, 75% on the left.   3. A 3 to 4 mm hyperdense focus at the mid brain remains indeterminate,   could be a small focus of hemorrhage or vascular abnormality. Close   follow-up/further evaluation recommended.   4. Possible mucosal lesion in the trachea versus lobulated mucus.           The findings were discussed by telephone with Dr. Sanchez at 1235,   1248, 08/01/2020       This report was finalized on 8/1/2020 12:56 PM by Dr. Jayjay Corbett M.D.          CT Angiogram Head   Final Result           1. No perfusion abnormality to suggest completed or threatened acute   infarct.   2. No arterial cutoff. Narrowing at the bilateral ICA cavernous   segments, estimated at 65% on the right, 75% on the left.   3. A 3 to 4 mm hyperdense focus at the mid brain remains indeterminate,   could be a small focus of hemorrhage or vascular abnormality. Close   follow-up/further evaluation recommended.   4. Possible mucosal lesion in the trachea versus lobulated mucus.           The findings were discussed by telephone with Dr. Sanchez at 1235,   1248, 08/01/2020       This report was finalized on 8/1/2020 12:56 PM by Dr. Jayjay Corbett M.D.              Assessment/Plan     Patient Active Problem List   Diagnosis   • Acute metabolic encephalopathy   • Unresponsive   • COPD (chronic obstructive pulmonary disease) (CMS/[facility])   • Diabetes mellitus (CMS/[facility])   • Hypertension   • ESRD (end stage renal disease) (CMS/[facility])   • UTI (urinary tract infection)   • Elevated troponin   • Anemia due to chronic kidney disease       Assessment:  1. Anemia and heme positive stool no overt bleeding  2. End-stage renal disease  apparently in discussion with the primary service patient has refused dialysis  3. Altered mental status  4. Diabetes  5. Constipation    These problems are new to me      Plan:  · Poor prognosis with no hemodialysis.  No acute intervention of GI needed.  Discussed with the primary care team that we would be around if there is a decision that is changed and the patient undergoes dialysis.  GI service to be available over the weekend and will check the patient on Monday.  If change of plans and GI services are needed over the weekend please call  I discussed the patients findings and my recommendations with patient and primary care team.             Tr Babb M.D.  [facility] [place]  [address] [place] [address]  Office: [phone]

## 2020-08-14 NOTE — PLAN OF CARE
Problem: Patient Care Overview  Goal: Plan of Care Review  Outcome: Ongoing (interventions implemented as appropriate)  Flowsheets (Taken 8/14/2020 1815)  Progress: declining  Plan of Care Reviewed With: patient  Outcome Summary: Pt medicated x2 with 0.5 mg dilaudid and 0.5 mg ativan. Pt is unresponsive and agonal breathing. Pt appears to be resting comfortably. Will continue to monitor and provide comfort care.

## 2020-08-14 NOTE — PROGRESS NOTES
Discharge Planning Assessment  [facility]     Patient Name: Lucien Avalos  MRN: [id number]  Today's Date: 8/14/2020    Admit Date: 8/1/2020    Discharge Needs Assessment    No documentation.       Discharge Plan     Row Name 08/14/20 1229       Plan    Plan Comments  The patient transferred to [facility] from [address] [place] [address] on 8/13/2020. The patient is palliative. [facility] to evaluate. CCP will follow for any needs that may arise. WELLINGTON Nelson RN, CCP        Destination - Selection Complete      Service Provider Request Status Selected Services Address Phone Number Fax Number    [facility] Selected Skilled Nursing [address] [address] [phone]     [facility] Pending - No Request Sent N/A [address] [facility] [address] [phone] --      Durable Medical Equipment      Coordination has not been started for this encounter.      Dialysis/Infusion      Coordination has not been started for this encounter.      Home Medical Care      Coordination has not been started for this encounter.      Therapy      Coordination has not been started for this encounter.      Community Resources      Coordination has not been started for this encounter.        Expected Discharge Date and Time     Expected Discharge Date Expected Discharge Time    Aug 7, 2020         Demographic Summary    No documentation.       Functional Status    No documentation.       Psychosocial    No documentation.       Abuse/Neglect    No documentation.       Legal    No documentation.       Substance Abuse    No documentation.       Patient Forms    No documentation.           Brenda Nelson RN

## 2020-08-14 NOTE — PLAN OF CARE
Problem: Patient Care Overview  Goal: Plan of Care Review  Outcome: Ongoing (interventions implemented as appropriate)  Flowsheets (Taken 8/14/2020 0514)  Progress: no change  Plan of Care Reviewed With: patient  Outcome Summary: Pt transferred from 5N to room P488. Pt welcomed and oriented to unit, questions encouraged. At time of transfer pt ambulated from stretcher to bed with assist and was communicating with staff, within minutes of arrival pt in bed resting and required multiple attempts to awaken for assessment. Pt symptomatic of pain restless and anxiety, pt with c/o pain, PRN meds admin and pt appears to have rested/slept comfortably through NOC. Continue to monitor and tx per POC and MD orders.

## 2020-08-14 NOTE — PROGRESS NOTES
"   LOS: 13 days    Patient Care Team:  Matilde Hi MD as PCP - General (Internal Medicine)    Chief Complaint:    Chief Complaint   Patient presents with   • Altered Mental Status   • Hypotension     Follow UP ESRD  Subjective     Interval History:    RN updated me.  Patient has chosen to stop dialysis due to his poor quality of life.  He has chosen comfort care.  His sister is supportive of his decision.    Objective     Vital Signs  Temp:  [97.4 °F (36.3 °C)-98.6 °F (37 °C)] 98.6 °F (37 °C)  Heart Rate:  [57-88] 57  Resp:  [16-18] 16  BP: (64-95)/(31-72) 64/31    Flowsheet Rows      First Filed Value   Admission Height  170.2 cm (67\") Documented at 08/01/2020 1247   Admission Weight  71.7 kg (158 lb 1.6 oz) Documented at 08/01/2020 1210          No intake/output data recorded.  I/O last 3 completed shifts:  In: 720 [P.O.:720]  Out: -     Intake/Output Summary (Last 24 hours) at 8/14/2020 1803  Last data filed at 8/14/2020 1732  Gross per 24 hour   Intake 0 ml   Output --   Net 0 ml       Physical Exam:  Exam deferred.  He is lying in bed no distress.   Results Review:    Results from last 7 days   Lab Units 08/13/20  0934 08/12/20  0540 08/11/20  1555   SODIUM mmol/L 136 138 139   POTASSIUM mmol/L 5.1 4.9 3.9   CHLORIDE mmol/L 98 101 100   CO2 mmol/L 24.5 30.7* 26.3   BUN mg/dL 80* 48* 34*   CREATININE mg/dL 6.26* 4.38* 3.39*   CALCIUM mg/dL 8.3* 8.1* 8.1*   GLUCOSE mg/dL 166* 211* 156*       Estimated Creatinine Clearance: 11 mL/min (A) (by C-G formula based on SCr of 6.26 mg/dL (H)).    Results from last 7 days   Lab Units 08/10/20  1319 08/09/20  0447 08/08/20  0427   MAGNESIUM mg/dL  --   --  2.1   PHOSPHORUS mg/dL 3.7 4.6* 4.8*             Results from last 7 days   Lab Units 08/13/20  0935 08/12/20  0540 08/11/20  1554 08/10/20  1319 08/09/20  0447   WBC 10*3/mm3 10.38 7.87 6.67 5.11 5.75   HEMOGLOBIN g/dL 7.0* 7.6* 6.5* 7.3* 7.9*   PLATELETS 10*3/mm3 130* 117* 109*  107* 106* 99*         "       Imaging Results (Last 24 Hours)     ** No results found for the last 24 hours. **          aspirin 81 mg Oral Daily   bisacodyl 10 mg Rectal Daily   folic acid 1 mg Oral Daily   OLANZapine zydis 10 mg Oral Daily   OLANZapine zydis 20 mg Oral Nightly   sodium chloride 10 mL Intravenous Q12H          Medication Review:   Current Facility-Administered Medications   Medication Dose Route Frequency Provider Last Rate Last Dose   • acetaminophen (TYLENOL) tablet 650 mg  650 mg Oral Q4H PRN Rainer Stevenson MD   650 mg at 08/13/20 1445    Or   • acetaminophen (TYLENOL) 160 MG/5ML solution 650 mg  650 mg Oral Q4H PRN Rainer Stevenson MD        Or   • acetaminophen (TYLENOL) suppository 650 mg  650 mg Rectal Q4H PRN Rainer Stevenson MD       • acetaminophen (TYLENOL) tablet 650 mg  650 mg Oral Q4H PRN Vince Otto MD        Or   • acetaminophen (TYLENOL) 160 MG/5ML solution 650 mg  650 mg Oral Q4H PRN Vince Otto MD        Or   • acetaminophen (TYLENOL) suppository 650 mg  650 mg Rectal Q4H PRN Vince Otto MD       • albumin human 25 % IV SOLN 25 g  25 g Intravenous 4x Daily PRN Connor Vazquez MD       • aspirin chewable tablet 81 mg  81 mg Oral Daily Janet Garrett MD   81 mg at 08/13/20 0917   • bisacodyl (DULCOLAX) suppository 10 mg  10 mg Rectal Daily Hung Rosenberg MD   10 mg at 08/12/20 0912   • dextrose (D50W) 25 g/ 50mL Intravenous Solution 25 g  25 g Intravenous Q15 Min PRN Vince Otto MD       • dextrose (GLUTOSE) oral gel 15 g  15 g Oral Q15 Min PRN Vince Otto MD       • diphenoxylate-atropine (LOMOTIL) 2.5-0.025 MG per tablet 1 tablet  1 tablet Oral Q2H PRN Vince Otto MD       • folic acid (FOLVITE) tablet 1 mg  1 mg Oral Daily Luis F Zapata MD   1 mg at 08/13/20 0917   • glucagon (human recombinant) (GLUCAGEN DIAGNOSTIC) injection 1 mg  1 mg Subcutaneous Q15 Min PRN Vince Otto MD       • Glycerin-Hypromellose- (ARTIFICIAL TEARS) 0.2-0.2-1 %  ophthalmic solution solution 1 drop  1 drop Both Eyes Q30 Min PRN Vince Otto MD       • glycopyrrolate PF (ROBINUL) injection 0.2 mg  0.2 mg Intravenous Q2H PRN Vince Otto MD        Or   • glycopyrrolate PF (ROBINUL) injection 0.2 mg  0.2 mg Subcutaneous Q2H PRVince Soares MD        Or   • glycopyrrolate PF (ROBINUL) injection 0.4 mg  0.4 mg Intravenous Q2H PRN Vince Otto MD        Or   • glycopyrrolate PF (ROBINUL) injection 0.4 mg  0.4 mg Subcutaneous Q2H PRN Vince Otto MD       • HYDROmorphone (DILAUDID) injection 0.5 mg  0.5 mg Intravenous Q1H PRN Vince Otto MD   0.5 mg at 08/14/20 1436    Or   • morphine injection 2 mg  2 mg Intravenous Q1H PRN Vince Otto MD   2 mg at 08/13/20 1716    Or   • morphine concentrated solution solution 5 mg  5 mg Sublingual Q1H PRN Vince Otto MD       • HYDROmorphone (DILAUDID) injection 1 mg  1 mg Intravenous Q1H PRN Vince Otto MD        Or   • morphine injection 4 mg  4 mg Intravenous Q1H PRN Vince Otto MD        Or   • morphine concentrated solution solution 10 mg  10 mg Sublingual Q1H PRN Vince Otto MD       • HYDROmorphone (DILAUDID) injection 1.5 mg  1.5 mg Intravenous Q1H PRVince Soares MD        Or   • Morphine sulfate (PF) injection 6 mg  6 mg Intravenous Q1H PRN Vince Otto MD        Or   • morphine concentrated solution solution 20 mg  20 mg Sublingual Q1H PRVince Soares MD       • LORazepam (ATIVAN) injection 0.5 mg  0.5 mg Intravenous Q1H PRN Vince Otto MD   0.5 mg at 08/14/20 1436    Or   • LORazepam (ATIVAN) 2 MG/ML concentrated solution 0.5 mg  0.5 mg Sublingual Q1H PRN Vince Otto MD       • LORazepam (ATIVAN) injection 1 mg  1 mg Intravenous Q1H PRN Vince Otto MD        Or   • LORazepam (ATIVAN) 2 MG/ML concentrated solution 1 mg  1 mg Sublingual Q1H PRN Vince Otto MD       • LORazepam (ATIVAN) injection 2 mg  2 mg Intravenous Q1H PRN Fam  MD Vince        Or   • LORazepam (ATIVAN) 2 MG/ML concentrated solution 2 mg  2 mg Sublingual Q1H PRN Vince Otto MD       • magic mouthwash oral supsension 5 mL  5 mL Swish & Spit Q4H PRN Vince Otto MD       • nitroglycerin (NITROSTAT) ointment 0.5 inch  0.5 inch Topical Q6H PRN Janet Garrett MD       • nitroglycerin (NITROSTAT) SL tablet 0.4 mg  0.4 mg Sublingual Q5 Min PRN Rainer Stevenson MD   0.4 mg at 08/08/20 0447   • OLANZapine zydis (ZyPREXA) disintegrating tablet 10 mg  10 mg Oral Daily Luis F Myers III, MD   10 mg at 08/13/20 0919   • OLANZapine zydis (ZyPREXA) disintegrating tablet 20 mg  20 mg Oral Nightly Luis F Myers III, MD   Stopped at 08/13/20 2019   • ondansetron (ZOFRAN) injection 4 mg  4 mg Intravenous Q6H PRN Rainer Stevenson MD   4 mg at 08/13/20 1716   • polyethylene glycol (MIRALAX) packet 17 g  17 g Oral BID PRN Janet Bro APRN       • Scopolamine (TRANSDERM-SCOP) 1.5 MG/3DAYS patch 1 patch  1 patch Transdermal Q72H PRN Vince Otto MD       • sodium chloride 0.9 % flush 10 mL  10 mL Intravenous PRN Rainer Stevenson MD       • sodium chloride 0.9 % flush 10 mL  10 mL Intravenous Q12H Rainer Stevenson MD   10 mL at 08/14/20 0900   • sodium chloride 0.9 % flush 10 mL  10 mL Intravenous PRN Rainer Stevenson MD           Assessment/Plan         Unresponsive    Acute metabolic encephalopathy    COPD (chronic obstructive pulmonary disease) (CMS/[facility])    Diabetes mellitus (CMS/[facility])    Hypertension    ESRD (end stage renal disease) (CMS/[facility])    UTI (urinary tract infection)    Elevated troponin    Anemia due to chronic kidney disease    1.  ESRD.  Dialysis today.       2.  Altered mental state.  Psych note reviewed. ZYprexa increased.   3.  Anemia and thrombocytopenia.     4.  Diabetes mellitus type 2  5.  Pyuria.  No growth on urine culture.   6. TCP, chronic.  7.  Frailty     Plan:  Agree with comfort care according to the patient's wishes.    Janet  Brendon Garcia MD  08/14/20  18:03

## 2020-08-17 NOTE — PAYOR COMM NOTE
"P: [phone]  F: [phone]    CONTINUED STAY REVIEW AND DC SUMMARY    REF #[id number]    ID #[id number]        Lucien Avalos (Dcsd. Male)     Date of Birth Social Security Number Address Home Phone MRN    1945  [place] [address] [place] [facility] [id number] [phone] [id number]    Denominational Marital Status          None Unknown       Admission Date Admission Type Admitting Provider Attending Provider Department, Room/Bed    8/1/20 Emergency Rainer Stevenson MD  [facility] [address] [place], P488/1    Discharge Date Discharge Disposition Discharge Destination        8/14/2020 Hospice/Medical Facility ([facility] [place], [facility])              Attending Provider:  (none)   Allergies:  No Known Allergies    Isolation:  None   Infection:  None   Code Status:  Prior    Ht:  170.2 cm (67.01\")   Wt:  74.3 kg (163 lb 12.8 oz)    Admission Cmt:  None   Principal Problem:  Unresponsive [R41.89]                 Active Insurance as of 8/1/2020     Primary Coverage     Payor Plan Insurance Group Employer/Plan Group    [facility] WELL[employer] MEDICARE REPLACEMENT [id number]     Payor Plan Address Payor Plan Phone Number Payor Plan Fax Number Effective Dates    [address] [address] [phone]  8/1/2020 - None Entered    [facility] [id number]       Subscriber Name Subscriber Birth Date Member ID       Lucien Avalos 1945 [id number]           Secondary Coverage     Payor Plan Insurance Group Employer/Plan Group    [place]      Payor Plan Address Payor Plan Phone Number Payor Plan Fax Number Effective Dates    [address] [phone]  8/1/2020 - None Entered    [facility] [id number]       Subscriber Name Subscriber Birth Date Member ID       LUCIEN AVAOLS 1945 [id number]                 Emergency Contacts      (Rel.) Home Phone Work Phone Mobile Phone    ROHINI ZHAO (Sister) [phone] -- --               Physician Progress Notes    " "Janet Garcia MD at 08/14/20 1803             LOS: 13 days    Patient Care Team:  Person, Matilde Kunz MD as PCP - General (Internal Medicine)    Chief Complaint:    Chief Complaint   Patient presents with   • Altered Mental Status   • Hypotension     Follow UP ESRD  Subjective     Interval History:    RN updated me.  Patient has chosen to stop dialysis due to his poor quality of life.  He has chosen comfort care.  His sister is supportive of his decision.    Objective     Vital Signs  Temp:  [97.4 °F (36.3 °C)-98.6 °F (37 °C)] 98.6 °F (37 °C)  Heart Rate:  [57-88] 57  Resp:  [16-18] 16  BP: (64-95)/(31-72) 64/31    Flowsheet Rows      First Filed Value   Admission Height  170.2 cm (67\") Documented at 08/01/2020 1247   Admission Weight  71.7 kg (158 lb 1.6 oz) Documented at 08/01/2020 1210          No intake/output data recorded.  I/O last 3 completed shifts:  In: 720 [P.O.:720]  Out: -     Intake/Output Summary (Last 24 hours) at 8/14/2020 1803  Last data filed at 8/14/2020 1732  Gross per 24 hour   Intake 0 ml   Output --   Net 0 ml       Physical Exam:  Exam deferred.  He is lying in bed no distress.   Results Review:    Results from last 7 days   Lab Units 08/13/20  0934 08/12/20  0540 08/11/20  1555   SODIUM mmol/L 136 138 139   POTASSIUM mmol/L 5.1 4.9 3.9   CHLORIDE mmol/L 98 101 100   CO2 mmol/L 24.5 30.7* 26.3   BUN mg/dL 80* 48* 34*   CREATININE mg/dL 6.26* 4.38* 3.39*   CALCIUM mg/dL 8.3* 8.1* 8.1*   GLUCOSE mg/dL 166* 211* 156*       Estimated Creatinine Clearance: 11 mL/min (A) (by C-G formula based on SCr of 6.26 mg/dL (H)).    Results from last 7 days   Lab Units 08/10/20  1319 08/09/20  0447 08/08/20  0427   MAGNESIUM mg/dL  --   --  2.1   PHOSPHORUS mg/dL 3.7 4.6* 4.8*             Results from last 7 days   Lab Units 08/13/20  0935 08/12/20  0540 08/11/20  1554 08/10/20  1319 08/09/20  0447   WBC 10*3/mm3 10.38 7.87 6.67 5.11 5.75   HEMOGLOBIN g/dL 7.0* 7.6* 6.5* 7.3* 7.9*   PLATELETS " 10*3/mm3 130* 117* 109*  107* 106* 99*               Imaging Results (Last 24 Hours)     ** No results found for the last 24 hours. **          aspirin 81 mg Oral Daily   bisacodyl 10 mg Rectal Daily   folic acid 1 mg Oral Daily   OLANZapine zydis 10 mg Oral Daily   OLANZapine zydis 20 mg Oral Nightly   sodium chloride 10 mL Intravenous Q12H          Medication Review:   Current Facility-Administered Medications   Medication Dose Route Frequency Provider Last Rate Last Dose   • acetaminophen (TYLENOL) tablet 650 mg  650 mg Oral Q4H PRN Rainer Setvenson MD   650 mg at 08/13/20 1445    Or   • acetaminophen (TYLENOL) 160 MG/5ML solution 650 mg  650 mg Oral Q4H PRN Rainer Stevenson MD        Or   • acetaminophen (TYLENOL) suppository 650 mg  650 mg Rectal Q4H PRN Rainer Stevenson MD       • acetaminophen (TYLENOL) tablet 650 mg  650 mg Oral Q4H PRN Vince Otto MD        Or   • acetaminophen (TYLENOL) 160 MG/5ML solution 650 mg  650 mg Oral Q4H PRN Vince Otto MD        Or   • acetaminophen (TYLENOL) suppository 650 mg  650 mg Rectal Q4H PRN Vince Otto MD       • albumin human 25 % IV SOLN 25 g  25 g Intravenous 4x Daily PRN Connor Vazquez MD       • aspirin chewable tablet 81 mg  81 mg Oral Daily Janet Garrett MD   81 mg at 08/13/20 0917   • bisacodyl (DULCOLAX) suppository 10 mg  10 mg Rectal Daily Hung Rosenberg MD   10 mg at 08/12/20 0912   • dextrose (D50W) 25 g/ 50mL Intravenous Solution 25 g  25 g Intravenous Q15 Min PRN Vince Otto MD       • dextrose (GLUTOSE) oral gel 15 g  15 g Oral Q15 Min PRN Vince Otto MD       • diphenoxylate-atropine (LOMOTIL) 2.5-0.025 MG per tablet 1 tablet  1 tablet Oral Q2H PRN Vince Otto MD       • folic acid (FOLVITE) tablet 1 mg  1 mg Oral Daily Luis F Zapata MD   1 mg at 08/13/20 0917   • glucagon (human recombinant) (GLUCAGEN DIAGNOSTIC) injection 1 mg  1 mg Subcutaneous Q15 Min PRN Vince Otto MD       •  Glycerin-Hypromellose- (ARTIFICIAL TEARS) 0.2-0.2-1 % ophthalmic solution solution 1 drop  1 drop Both Eyes Q30 Min PRN Vince Otto MD       • glycopyrrolate PF (ROBINUL) injection 0.2 mg  0.2 mg Intravenous Q2H PRN Vince Otto MD        Or   • glycopyrrolate PF (ROBINUL) injection 0.2 mg  0.2 mg Subcutaneous Q2H PRN Vince Otto MD        Or   • glycopyrrolate PF (ROBINUL) injection 0.4 mg  0.4 mg Intravenous Q2H PRN Vince Otto MD        Or   • glycopyrrolate PF (ROBINUL) injection 0.4 mg  0.4 mg Subcutaneous Q2H PRN Vince Otto MD       • HYDROmorphone (DILAUDID) injection 0.5 mg  0.5 mg Intravenous Q1H PRN Vince Otto MD   0.5 mg at 08/14/20 1436    Or   • morphine injection 2 mg  2 mg Intravenous Q1H PRN Vince Otto MD   2 mg at 08/13/20 1716    Or   • morphine concentrated solution solution 5 mg  5 mg Sublingual Q1H PRN Vince Otto MD       • HYDROmorphone (DILAUDID) injection 1 mg  1 mg Intravenous Q1H PRN Vince Otto MD        Or   • morphine injection 4 mg  4 mg Intravenous Q1H PRN Vince Otto MD        Or   • morphine concentrated solution solution 10 mg  10 mg Sublingual Q1H PRN Vince Otto MD       • HYDROmorphone (DILAUDID) injection 1.5 mg  1.5 mg Intravenous Q1H PRVince Soares MD        Or   • Morphine sulfate (PF) injection 6 mg  6 mg Intravenous Q1H PRN Vince Otto MD        Or   • morphine concentrated solution solution 20 mg  20 mg Sublingual Q1H PRVince Soares MD       • LORazepam (ATIVAN) injection 0.5 mg  0.5 mg Intravenous Q1H PRN Vince Otto MD   0.5 mg at 08/14/20 1436    Or   • LORazepam (ATIVAN) 2 MG/ML concentrated solution 0.5 mg  0.5 mg Sublingual Q1H PRN Vince Otto MD       • LORazepam (ATIVAN) injection 1 mg  1 mg Intravenous Q1H PRN Vince Otto MD        Or   • LORazepam (ATIVAN) 2 MG/ML concentrated solution 1 mg  1 mg Sublingual Q1H PRN Vince Otto MD       •  LORazepam (ATIVAN) injection 2 mg  2 mg Intravenous Q1H PRN Vince Otto MD        Or   • LORazepam (ATIVAN) 2 MG/ML concentrated solution 2 mg  2 mg Sublingual Q1H PRN Vince Otto MD       • magic mouthwash oral supsension 5 mL  5 mL Swish & Spit Q4H PRN Vince Otto MD       • nitroglycerin (NITROSTAT) ointment 0.5 inch  0.5 inch Topical Q6H PRN Janet Garrett MD       • nitroglycerin (NITROSTAT) SL tablet 0.4 mg  0.4 mg Sublingual Q5 Min PRN Rainer Stevenson MD   0.4 mg at 08/08/20 0447   • OLANZapine zydis (ZyPREXA) disintegrating tablet 10 mg  10 mg Oral Daily Luis F Myers III, MD   10 mg at 08/13/20 0919   • OLANZapine zydis (ZyPREXA) disintegrating tablet 20 mg  20 mg Oral Nightly Luis F Myers III, MD   Stopped at 08/13/20 2019   • ondansetron (ZOFRAN) injection 4 mg  4 mg Intravenous Q6H PRN Rainer Stevenson MD   4 mg at 08/13/20 1716   • polyethylene glycol (MIRALAX) packet 17 g  17 g Oral BID PRN Janet Bro APRN       • Scopolamine (TRANSDERM-SCOP) 1.5 MG/3DAYS patch 1 patch  1 patch Transdermal Q72H PRN Vince Otto MD       • sodium chloride 0.9 % flush 10 mL  10 mL Intravenous PRN Rainer Stevenson MD       • sodium chloride 0.9 % flush 10 mL  10 mL Intravenous Q12H Rainer Stevenson MD   10 mL at 08/14/20 0900   • sodium chloride 0.9 % flush 10 mL  10 mL Intravenous PRN Rainer Stevenson MD           Assessment/Plan         Unresponsive    Acute metabolic encephalopathy    COPD (chronic obstructive pulmonary disease) (CMS/HCC)    Diabetes mellitus (CMS/HCC)    Hypertension    ESRD (end stage renal disease) (CMS/[facility])    UTI (urinary tract infection)    Elevated troponin    Anemia due to chronic kidney disease    1.  ESRD.  Dialysis today.       2.  Altered mental state.  Psych note reviewed. ZYprexa increased.   3.  Anemia and thrombocytopenia.     4.  Diabetes mellitus type 2  5.  Pyuria.  No growth on urine culture.   6. TCP, chronic.  7.   Frailty     Plan:  Agree with comfort care according to the patient's wishes.    Janet Garcia MD  08/14/20  18:03      Electronically signed by Janet Garcia MD at 08/14/20 1808     Tr Babb MD at 08/14/20 0942          Gastroenterology   Inpatient Progress Note    Reason for Follow Up:  anemia    Subjective     Interval History:   No complaints.  Not talkative, not combative.  No signs of bleeding from RN    Current Facility-Administered Medications:   •  acetaminophen (TYLENOL) tablet 650 mg, 650 mg, Oral, Q4H PRN, 650 mg at 08/13/20 1445 **OR** acetaminophen (TYLENOL) 160 MG/5ML solution 650 mg, 650 mg, Oral, Q4H PRN **OR** acetaminophen (TYLENOL) suppository 650 mg, 650 mg, Rectal, Q4H PRN, Rainer Stevenson MD  •  acetaminophen (TYLENOL) tablet 650 mg, 650 mg, Oral, Q4H PRN **OR** acetaminophen (TYLENOL) 160 MG/5ML solution 650 mg, 650 mg, Oral, Q4H PRN **OR** acetaminophen (TYLENOL) suppository 650 mg, 650 mg, Rectal, Q4H PRN, Vince Otto MD  •  albumin human 25 % IV SOLN 25 g, 25 g, Intravenous, 4x Daily PRN, Connor Vazquez MD  •  aspirin chewable tablet 81 mg, 81 mg, Oral, Daily, Janet Garrett MD, 81 mg at 08/13/20 0917  •  bisacodyl (DULCOLAX) suppository 10 mg, 10 mg, Rectal, Daily, Hung Rosenberg MD, 10 mg at 08/12/20 0912  •  dextrose (D50W) 25 g/ 50mL Intravenous Solution 25 g, 25 g, Intravenous, Q15 Min PRN, Vince Otto MD  •  dextrose (GLUTOSE) oral gel 15 g, 15 g, Oral, Q15 Min PRN, Vince Otto MD  •  diphenoxylate-atropine (LOMOTIL) 2.5-0.025 MG per tablet 1 tablet, 1 tablet, Oral, Q2H PRN, Vince Otto MD  •  folic acid (FOLVITE) tablet 1 mg, 1 mg, Oral, Daily, Luis F Zapata MD, 1 mg at 08/13/20 0917  •  glucagon (human recombinant) (GLUCAGEN DIAGNOSTIC) injection 1 mg, 1 mg, Subcutaneous, Q15 Min PRN, Vince Otto MD  •  Glycerin-Hypromellose- (ARTIFICIAL TEARS) 0.2-0.2-1 % ophthalmic solution solution 1 drop, 1  drop, Both Eyes, Q30 Min PRN, Vince Otto MD  •  glycopyrrolate PF (ROBINUL) injection 0.2 mg, 0.2 mg, Intravenous, Q2H PRN **OR** glycopyrrolate PF (ROBINUL) injection 0.2 mg, 0.2 mg, Subcutaneous, Q2H PRN **OR** glycopyrrolate PF (ROBINUL) injection 0.4 mg, 0.4 mg, Intravenous, Q2H PRN **OR** glycopyrrolate PF (ROBINUL) injection 0.4 mg, 0.4 mg, Subcutaneous, Q2H PRN, Vince Otto MD  •  HYDROmorphone (DILAUDID) injection 0.5 mg, 0.5 mg, Intravenous, Q1H PRN, 0.5 mg at 08/14/20 0838 **OR** morphine injection 2 mg, 2 mg, Intravenous, Q1H PRN, 2 mg at 08/13/20 1716 **OR** morphine concentrated solution solution 5 mg, 5 mg, Sublingual, Q1H PRN, Vince Otto MD  •  HYDROmorphone (DILAUDID) injection 1 mg, 1 mg, Intravenous, Q1H PRN **OR** morphine injection 4 mg, 4 mg, Intravenous, Q1H PRN **OR** morphine concentrated solution solution 10 mg, 10 mg, Sublingual, Q1H PRN, Vince Otto MD  •  HYDROmorphone (DILAUDID) injection 1.5 mg, 1.5 mg, Intravenous, Q1H PRN **OR** Morphine sulfate (PF) injection 6 mg, 6 mg, Intravenous, Q1H PRN **OR** morphine concentrated solution solution 20 mg, 20 mg, Sublingual, Q1H PRN, Vince Otto MD  •  insulin lispro (humaLOG) injection 0-7 Units, 0-7 Units, Subcutaneous, TID AC, Vince Otto MD, 3 Units at 08/13/20 1747  •  LORazepam (ATIVAN) injection 0.5 mg, 0.5 mg, Intravenous, Q1H PRN, 0.5 mg at 08/14/20 0838 **OR** LORazepam (ATIVAN) 2 MG/ML concentrated solution 0.5 mg, 0.5 mg, Sublingual, Q1H PRN, Vince Otto MD  •  LORazepam (ATIVAN) injection 1 mg, 1 mg, Intravenous, Q1H PRN **OR** LORazepam (ATIVAN) 2 MG/ML concentrated solution 1 mg, 1 mg, Sublingual, Q1H PRN, Vince Otto MD  •  LORazepam (ATIVAN) injection 2 mg, 2 mg, Intravenous, Q1H PRN **OR** LORazepam (ATIVAN) 2 MG/ML concentrated solution 2 mg, 2 mg, Sublingual, Q1H PRN, Vince Otto MD  •  magic mouthwash oral supsension 5 mL, 5 mL, Swish & Spit, Q4H PRN, Vince Otto,  MD  •  nitroglycerin (NITROSTAT) ointment 0.5 inch, 0.5 inch, Topical, Q6H PRN, Janet Garrett MD  •  nitroglycerin (NITROSTAT) SL tablet 0.4 mg, 0.4 mg, Sublingual, Q5 Min PRN, Rainer Stevenson MD, 0.4 mg at 08/08/20 0447  •  OLANZapine zydis (ZyPREXA) disintegrating tablet 10 mg, 10 mg, Oral, Daily, Luis F Myers III, MD, 10 mg at 08/13/20 0919  •  OLANZapine zydis (ZyPREXA) disintegrating tablet 20 mg, 20 mg, Oral, Nightly, Luis F Myers III, MD, Stopped at 08/13/20 2019  •  ondansetron (ZOFRAN) injection 4 mg, 4 mg, Intravenous, Q6H PRN, Rainer Stevenson MD, 4 mg at 08/13/20 1716  •  polyethylene glycol (MIRALAX) packet 17 g, 17 g, Oral, BID PRN, Janet Bro APRN  •  Scopolamine (TRANSDERM-SCOP) 1.5 MG/3DAYS patch 1 patch, 1 patch, Transdermal, Q72H PRN, Vince Otto MD  •  sodium chloride 0.9 % flush 10 mL, 10 mL, Intravenous, PRN, Rainer Stevenson MD  •  sodium chloride 0.9 % flush 10 mL, 10 mL, Intravenous, Q12H, Rainer Stevenson MD, 10 mL at 08/13/20 2019  •  sodium chloride 0.9 % flush 10 mL, 10 mL, Intravenous, PRN, Rainer Stevenson MD  Review of Systems:               Review of systems could not be obtained due to  patient confusion.    Objective     Vital Signs  Temp:  [97.4 °F (36.3 °C)-97.8 °F (36.6 °C)] 97.4 °F (36.3 °C)  Heart Rate:  [87-94] 88  Resp:  [16-18] 16  BP: (93-95)/(47-72) 95/72  Body mass index is 25.65 kg/m².    Intake/Output Summary (Last 24 hours) at 8/14/2020 0942  Last data filed at 8/14/2020 0550  Gross per 24 hour   Intake 360 ml   Output --   Net 360 ml     No intake/output data recorded.                Physical Exam:              General: patient awake, not communicative              Eyes: no scleral icterus              Skin: warm and dry, not jaundiced              Abdomen: soft, nontender, nondistended; normal bowel sounds, no masses palpated, no periumbical lymphadenopathy              Psychiatric: Inppropriate affect and behavior                 Results Review:                I reviewed the patient's new clinical results.    Results from last 7 days   Lab Units 08/13/20  0935 08/12/20  0540 08/11/20  1554   WBC 10*3/mm3 10.38 7.87 6.67   HEMOGLOBIN g/dL 7.0* 7.6* 6.5*   HEMATOCRIT % 23.3* 24.0* 20.0*   PLATELETS 10*3/mm3 130* 117* 109*  107*     Results from last 7 days   Lab Units 08/13/20  0934 08/12/20  0540 08/11/20  1555   SODIUM mmol/L 136 138 139   POTASSIUM mmol/L 5.1 4.9 3.9   CHLORIDE mmol/L 98 101 100   CO2 mmol/L 24.5 30.7* 26.3   BUN mg/dL 80* 48* 34*   CREATININE mg/dL 6.26* 4.38* 3.39*   CALCIUM mg/dL 8.3* 8.1* 8.1*   GLUCOSE mg/dL 166* 211* 156*         Lab Results   Lab Value Date/Time    LIPASE 23 04/03/2020 2355    LIPASE 85 12/19/2019 0925    LIPASE 140 09/01/2019 1645    LIPASE 84 05/09/2019 1317    LIPASE 147 02/25/2019 0558       Radiology:  MRI Brain Without Contrast   Final Result       No acute infarct. No findings to suggest hemorrhage at the midbrain,   although the exam is somewhat limited by motion artifact; the CT density   of concern on the earlier CT may represent vascular anomaly. Follow-up   evaluation with enhanced MRI may be helpful when/if possible, and close   follow-up CT could be obtained to ensure stability of the CT finding of   concern.       This report was finalized on 8/1/2020 3:43 PM by Dr. Jayjay Corbett M.D.          XR Chest 1 View   Final Result   Small likely atelectasis or scarring. Follow-up as   clinically indicated.       This report was finalized on 8/1/2020 1:34 PM by Dr. Jayjay Corbett M.D.          CT Cerebral Perfusion With & Without Contrast   Final Result           1. No perfusion abnormality to suggest completed or threatened acute   infarct.   2. No arterial cutoff. Narrowing at the bilateral ICA cavernous   segments, estimated at 65% on the right, 75% on the left.   3. A 3 to 4 mm hyperdense focus at the mid brain remains indeterminate,   could be a small focus of hemorrhage  or vascular abnormality. Close   follow-up/further evaluation recommended.   4. Possible mucosal lesion in the trachea versus lobulated mucus.           The findings were discussed by telephone with Dr. Sanchez at 1235,   1248, 08/01/2020       This report was finalized on 8/1/2020 12:56 PM by Dr. Jayjay Corbett M.D.          CT Angiogram Neck   Final Result           1. No perfusion abnormality to suggest completed or threatened acute   infarct.   2. No arterial cutoff. Narrowing at the bilateral ICA cavernous   segments, estimated at 65% on the right, 75% on the left.   3. A 3 to 4 mm hyperdense focus at the mid brain remains indeterminate,   could be a small focus of hemorrhage or vascular abnormality. Close   follow-up/further evaluation recommended.   4. Possible mucosal lesion in the trachea versus lobulated mucus.           The findings were discussed by telephone with Dr. Sanchez at 1235,   1248, 08/01/2020       This report was finalized on 8/1/2020 12:56 PM by Dr. Jayjay Corbett M.D.          CT Angiogram Head   Final Result           1. No perfusion abnormality to suggest completed or threatened acute   infarct.   2. No arterial cutoff. Narrowing at the bilateral ICA cavernous   segments, estimated at 65% on the right, 75% on the left.   3. A 3 to 4 mm hyperdense focus at the mid brain remains indeterminate,   could be a small focus of hemorrhage or vascular abnormality. Close   follow-up/further evaluation recommended.   4. Possible mucosal lesion in the trachea versus lobulated mucus.           The findings were discussed by telephone with Dr. Sanchez at 1235,   1248, 08/01/2020       This report was finalized on 8/1/2020 12:56 PM by Dr. Jayjay Corbett M.D.              Assessment/Plan     Patient Active Problem List   Diagnosis   • Acute metabolic encephalopathy   • Unresponsive   • COPD (chronic obstructive pulmonary disease) (CMS/HCC)   • Diabetes mellitus (CMS/HCC)   •  Hypertension   • ESRD (end stage renal disease) (CMS/[facility])   • UTI (urinary tract infection)   • Elevated troponin   • Anemia due to chronic kidney disease       Assessment:  1. Anemia and heme positive stool no overt bleeding  2. End-stage renal disease apparently in discussion with the primary service patient has refused dialysis  3. Altered mental status  4. Diabetes  5. Constipation    These problems are new to me      Plan:  · Poor prognosis with no hemodialysis.  No acute intervention of GI needed.  Discussed with the primary care team that we would be around if there is a decision that is changed and the patient undergoes dialysis.  GI service to be available over the weekend and will check the patient on Monday.  If change of plans and GI services are needed over the weekend please call  I discussed the patients findings and my recommendations with patient and primary care team.             Tr Babb M.D.  [facility] [place] [address]  Office: [phone]    Electronically signed by Tr Babb MD at 08/14/20 0946          Consult Notes      Anshu Lopes at 08/14/20 0919        Pt asleep, no one else present.  Will ask nurse to notify me when pt is awake and I will assess if pt is able to enact a living will as he requested yesterday.  Chaplain Clifford Lopes    Electronically signed by Anshu Lopes at 08/14/20 0920          Discharge Summary      Ashley Bagley APRN at 08/06/20 1602     Attestation signed by Vince Otto MD at 08/06/20 7586    I agree with current new discharge plan.  I have reviewed documentation and agree.  Patient is currently in bilateral wrist restraints.  He will go to [facility].     Physical exam   General:  agitated in bilateral wrist restraints.  Head and ENT: normocephalic and atraumatic.  Lungs: symmetric expansion and equal air entry bilaterally.  Heart: regular rate, rhythm, no  murmurs.  Abdomen: soft, nontender, bowel sounds present.  Extremities:  No clubbing, cyanosis or edema.  Skin:  Warm and no rash.                      Patient Name: Lucien Avalos  : 1945  MRN: [id number]    Date of Admission: 2020  Date of Discharge:  2020  Primary Care Physician: Matilde Hi MD      Chief Complaint:   Altered Mental Status and Hypotension      Discharge Diagnoses     Active Hospital Problems    Diagnosis  POA   • **Unresponsive [R41.89]  Unknown   • Acute metabolic encephalopathy [G93.41]  Yes   • COPD (chronic obstructive pulmonary disease) (CMS/[facility]) [J44.9]  Yes   • Diabetes mellitus (CMS/[facility]) [E11.9]  Yes   • Hypertension [I10]  Unknown   • ESRD (end stage renal disease) (CMS/[facility]) [N18.6]  Yes   • UTI (urinary tract infection) [N39.0]  Unknown   • Elevated troponin [R79.89]  Unknown   • Anemia due to chronic kidney disease [N18.9, D63.1]  Unknown      Resolved Hospital Problems   No resolved problems to display.        Hospital Course     Mr. Avalos is a 75 y.o. male with a history of diabetes, hypertension, ESRD on HD, anemia and COPD who presented to [facility] initially unresponsive.  Please see the admitting history and physical for further details.  He was found to have likely vascular dementia and was admitted to the hospital for further evaluation and treatment.  Neurology and psychiatry saw in consultation.  Nephrology saw in consultation who managed patient's hemodialysis.  Patient remained pretty agitated throughout hospital stay requiring nonviolent restraints.  He was started on as needed Zyprexa that was switched to twice daily scheduled.  The patient became suicidal on 2020.  Patient deemed acceptable to be discharged to [facility] for further evaluation and treatment.  He is medically stable and will be discharged over there today.     Day of Discharge     patient suicidal overnight. will be transferred to [facility] today. tolerated dialysis.      Denies chest pain, palpitations, SOA, edema, fever, chills, nausea vomiting.    Physical Exam:  Temp:  [98 °F (36.7 °C)-99.6 °F (37.6 °C)] 98 °F (36.7 °C)  Heart Rate:  [] 100  Resp:  [16-18] 16  BP: ()/(52-72) 109/57  Body mass index is 24.76 kg/m².  Physical Exam   Constitutional: He appears well-developed and well-nourished.   elderly, frail   HENT:   Head: Normocephalic and atraumatic.   Eyes: Conjunctivae and EOM are normal.   Neck: Neck supple. No JVD present.   Cardiovascular: Normal rate and regular rhythm.   Pulmonary/Chest: Effort normal and breath sounds normal.   Abdominal: Soft. Bowel sounds are normal. He exhibits no distension. There is no tenderness.   Musculoskeletal: Normal range of motion. He exhibits no edema.   Neurological: He is alert.   Skin: Skin is warm and dry.   Psychiatric: He has a normal mood and affect. His behavior is normal.   Nursing note and vitals reviewed.      Consultants     Consult Orders (all) (From admission, onward)     Start     Ordered    08/05/20 1325  Inpatient Psychiatrist Consult  Once     Specialty:  Psychiatry  Provider:  Luis F Myers III, MD    08/05/20 1325    08/03/20 0702  Inpatient Psychiatrist Consult  IN AM     Specialty:  Psychiatry  Provider:  Luis F Myers III, MD    08/02/20 1638    08/01/20 2123  Inpatient Neurology Consult General  Once     Specialty:  Neurology  Provider:  Naveen Olivares MD    08/01/20 2125 08/01/20 1914  Inpatient Nephrology Consult  Once     Specialty:  Nephrology  Provider:  Emory Mansfield MD    08/01/20 1915 08/01/20 1913  Inpatient Neurology Consult General  Once,   Status:  Canceled     Specialty:  Neurology  Provider:  Erasmo York MD    08/01/20 1915 08/01/20 1558  [facility] (on-call MD unless specified) Details  Once     Specialty:  Hospitalist  Provider:  (Not yet assigned)    08/01/20 1557    08/01/20 1214  Inpatient Neurology Consult Stroke  Once      Specialty:  Neurology  Provider:  (Not yet assigned)    08/01/20 1213    08/01/20 1214  Inpatient Neurology Consult Stroke  Once     Specialty:  Neurology  Provider:  (Not yet assigned)    08/01/20 1213              Procedures     Imaging Results (All)     Procedure Component Value Units Date/Time    MRI Brain Without Contrast [[id number]] Collected:  08/01/20 1514     Updated:  08/01/20 1546    Narrative:       MRI BRAIN WO CONTRAST-     INDICATIONS: Altered mental status, possible hemorrhage     TECHNIQUE: Multiplanar multisequence noncontrast magnetic resonance  imaging of the brain.     COMPARISON: CT from 08/01/2020     FINDINGS:     Several sequences are significantly degraded by motion artifact,  limiting the assessment.     The diffusion-weighted images show no restricted diffusion to suggest  acute infarct.     The midline structures appear unremarkable.     The brain parenchyma shows moderate periventricular white matter T2  FLAIR signal hyperintensities suggesting chronic small vessel ischemic  change in a patient this age.     Flow voids in the major arteries at the base of the brain appear  unremarkable. Left vertebral artery is dominant.     The paranasal sinuses, mastoid air cells, and orbits appear  unremarkable.       Impression:          No acute infarct. No findings to suggest hemorrhage at the midbrain,  although the exam is somewhat limited by motion artifact; the CT density  of concern on the earlier CT may represent vascular anomaly. Follow-up  evaluation with enhanced MRI may be helpful when/if possible, and close  follow-up CT could be obtained to ensure stability of the CT finding of  concern.     This report was finalized on 8/1/2020 3:43 PM by Dr. Jayjay Corbett M.D.       XR Chest 1 View [[id number]] Collected:  08/01/20 1325     Updated:  08/01/20 1337    Narrative:       XR CHEST 1 VW-     HISTORY: Male who is 75 years-old,  stroke     TECHNIQUE: Frontal view of the chest      COMPARISON: None available     FINDINGS: Heart is mildly enlarged. Mild prominence of vascular and  interstitial markings. Linear likely atelectasis or scarring the left  midlung. No pleural effusion or pneumothorax. No acute osseous process.       Impression:       Small likely atelectasis or scarring. Follow-up as  clinically indicated.     This report was finalized on 8/1/2020 1:34 PM by Dr. Jayjay Corbett M.D.       CT Angiogram Head [[id number]] Collected:  08/01/20 1242     Updated:  08/01/20 1259    Narrative:       CT ANGIOGRAM HEAD AND NECK AND CT PERFUSION STUDY     CLINICAL HISTORY: Altered mental status.     Radiation dose reduction techniques were utilized, including automated  exposure control and exposure modulation based on body size. CT scan of  the head was obtained with 3 mm axial images without the use  of IV contrast. The patient underwent a CT  perfusion study with a dynamic bolus of IV contrast. Standard perfusion  maps were constructed. The patient then underwent a CT angiogram of the  head and neck with 1 mm axial images following the administration of IV  contrast. Sagittal, coronal, and 3-dimensional reconstructed images were  obtained.  Percent stenosis was assessed in accordance with NASCET  criteria.     FINDINGS:     NONCONTRAST HEAD CT: On the noncontrast head CT, a 3-4 mm hyperdense  focus at the midbrain, for example axial image 17 could be a small focus  of blood or vascular anomaly, further evaluation with MRI may be  helpful, close interval follow-up can characterize change. Moderate  periventricular hypodensities suggest chronic small vessel ischemic  change in a patient this age. Arterial calcifications are seen at the  base the brain.     No enhancing lesions of brain are noted following contrast material  administration.        CT PERFUSION STUDY:  No CBF (under 30%) deficit or perfusion abnormality  is demonstrated where the T MAX is greater than 6 seconds.  The  calculated mismatch volume was 0 cc.     CTA HEAD and neck: The CT angiogram of the head and neck demonstrates  calcifications in the bilateral cervical and intracranial carotid  arteries, with estimated 65% narrowing at the cavernous segment of the  right ICA, 75% narrowing at the origin of the cavernous segment of the  left ICA otherwise without high-grade stenosis (0-49% stenosis). About  20% narrowing at the origin of the left cervical ICA. Otherwise, no  hemodynamically significant focal stenosis, aneurysm, or dissection in  the cervical carotid or vertebral arteries, or in the arteries at the  base of the brain. The left P1 segment is diminutive, the left PCA being  at least partially supplied by the anterior circulation. The lower  aspect of the left vertebral artery is obscured by attenuation artifact  from densely opacified left subclavian vein, precluding its assessment.     Lobulated mucous versus mucosal lesion measuring 1.7 cm in the posterior  right aspect of the trachea at the level of the thoracic inlet, axial  image 65, follow-up or direct visualization recommended.     Cervical spondyloarthropathy is present with uncovertebral joint and  facet hypertrophy result in bilateral neuroforaminal narrowing, more  prominent on the right at C4/5, C5/6, and on the left at C3/4, C5/6.             Impression:             1. No perfusion abnormality to suggest completed or threatened acute  infarct.  2. No arterial cutoff. Narrowing at the bilateral ICA cavernous  segments, estimated at 65% on the right, 75% on the left.  3. A 3 to 4 mm hyperdense focus at the mid brain remains indeterminate,  could be a small focus of hemorrhage or vascular abnormality. Close  follow-up/further evaluation recommended.  4. Possible mucosal lesion in the trachea versus lobulated mucus.        The findings were discussed by telephone with Dr. Sanchez at 1235,  1248, 08/01/2020     This report was finalized on 8/1/2020 12:56  PM by Dr. Jayjay Corbett M.D.       CT Cerebral Perfusion With & Without Contrast [[id number]] Collected:  08/01/20 1242     Updated:  08/01/20 1259    Narrative:       CT ANGIOGRAM HEAD AND NECK AND CT PERFUSION STUDY     CLINICAL HISTORY: Altered mental status.     Radiation dose reduction techniques were utilized, including automated  exposure control and exposure modulation based on body size. CT scan of  the head was obtained with 3 mm axial images without the use  of IV contrast. The patient underwent a CT  perfusion study with a dynamic bolus of IV contrast. Standard perfusion  maps were constructed. The patient then underwent a CT angiogram of the  head and neck with 1 mm axial images following the administration of IV  contrast. Sagittal, coronal, and 3-dimensional reconstructed images were  obtained.  Percent stenosis was assessed in accordance with NASCET  criteria.     FINDINGS:     NONCONTRAST HEAD CT: On the noncontrast head CT, a 3-4 mm hyperdense  focus at the midbrain, for example axial image 17 could be a small focus  of blood or vascular anomaly, further evaluation with MRI may be  helpful, close interval follow-up can characterize change. Moderate  periventricular hypodensities suggest chronic small vessel ischemic  change in a patient this age. Arterial calcifications are seen at the  base the brain.     No enhancing lesions of brain are noted following contrast material  administration.        CT PERFUSION STUDY:  No CBF (under 30%) deficit or perfusion abnormality  is demonstrated where the T MAX is greater than 6 seconds. The  calculated mismatch volume was 0 cc.     CTA HEAD and neck: The CT angiogram of the head and neck demonstrates  calcifications in the bilateral cervical and intracranial carotid  arteries, with estimated 65% narrowing at the cavernous segment of the  right ICA, 75% narrowing at the origin of the cavernous segment of the  left ICA otherwise without high-grade  stenosis (0-49% stenosis). About  20% narrowing at the origin of the left cervical ICA. Otherwise, no  hemodynamically significant focal stenosis, aneurysm, or dissection in  the cervical carotid or vertebral arteries, or in the arteries at the  base of the brain. The left P1 segment is diminutive, the left PCA being  at least partially supplied by the anterior circulation. The lower  aspect of the left vertebral artery is obscured by attenuation artifact  from densely opacified left subclavian vein, precluding its assessment.     Lobulated mucous versus mucosal lesion measuring 1.7 cm in the posterior  right aspect of the trachea at the level of the thoracic inlet, axial  image 65, follow-up or direct visualization recommended.     Cervical spondyloarthropathy is present with uncovertebral joint and  facet hypertrophy result in bilateral neuroforaminal narrowing, more  prominent on the right at C4/5, C5/6, and on the left at C3/4, C5/6.             Impression:             1. No perfusion abnormality to suggest completed or threatened acute  infarct.  2. No arterial cutoff. Narrowing at the bilateral ICA cavernous  segments, estimated at 65% on the right, 75% on the left.  3. A 3 to 4 mm hyperdense focus at the mid brain remains indeterminate,  could be a small focus of hemorrhage or vascular abnormality. Close  follow-up/further evaluation recommended.  4. Possible mucosal lesion in the trachea versus lobulated mucus.        The findings were discussed by telephone with Dr. Sanchez at 1235,  1248, 08/01/2020     This report was finalized on 8/1/2020 12:56 PM by Dr. Jayjay Corbett M.D.       CT Angiogram Neck [[id number]] Collected:  08/01/20 1242     Updated:  08/01/20 1259    Narrative:       CT ANGIOGRAM HEAD AND NECK AND CT PERFUSION STUDY     CLINICAL HISTORY: Altered mental status.     Radiation dose reduction techniques were utilized, including automated  exposure control and exposure modulation based on  body size. CT scan of  the head was obtained with 3 mm axial images without the use  of IV contrast. The patient underwent a CT  perfusion study with a dynamic bolus of IV contrast. Standard perfusion  maps were constructed. The patient then underwent a CT angiogram of the  head and neck with 1 mm axial images following the administration of IV  contrast. Sagittal, coronal, and 3-dimensional reconstructed images were  obtained.  Percent stenosis was assessed in accordance with NASCET  criteria.     FINDINGS:     NONCONTRAST HEAD CT: On the noncontrast head CT, a 3-4 mm hyperdense  focus at the midbrain, for example axial image 17 could be a small focus  of blood or vascular anomaly, further evaluation with MRI may be  helpful, close interval follow-up can characterize change. Moderate  periventricular hypodensities suggest chronic small vessel ischemic  change in a patient this age. Arterial calcifications are seen at the  base the brain.     No enhancing lesions of brain are noted following contrast material  administration.        CT PERFUSION STUDY:  No CBF (under 30%) deficit or perfusion abnormality  is demonstrated where the T MAX is greater than 6 seconds. The  calculated mismatch volume was 0 cc.     CTA HEAD and neck: The CT angiogram of the head and neck demonstrates  calcifications in the bilateral cervical and intracranial carotid  arteries, with estimated 65% narrowing at the cavernous segment of the  right ICA, 75% narrowing at the origin of the cavernous segment of the  left ICA otherwise without high-grade stenosis (0-49% stenosis). About  20% narrowing at the origin of the left cervical ICA. Otherwise, no  hemodynamically significant focal stenosis, aneurysm, or dissection in  the cervical carotid or vertebral arteries, or in the arteries at the  base of the brain. The left P1 segment is diminutive, the left PCA being  at least partially supplied by the anterior circulation. The lower  aspect of the  left vertebral artery is obscured by attenuation artifact  from densely opacified left subclavian vein, precluding its assessment.     Lobulated mucous versus mucosal lesion measuring 1.7 cm in the posterior  right aspect of the trachea at the level of the thoracic inlet, axial  image 65, follow-up or direct visualization recommended.     Cervical spondyloarthropathy is present with uncovertebral joint and  facet hypertrophy result in bilateral neuroforaminal narrowing, more  prominent on the right at C4/5, C5/6, and on the left at C3/4, C5/6.             Impression:             1. No perfusion abnormality to suggest completed or threatened acute  infarct.  2. No arterial cutoff. Narrowing at the bilateral ICA cavernous  segments, estimated at 65% on the right, 75% on the left.  3. A 3 to 4 mm hyperdense focus at the mid brain remains indeterminate,  could be a small focus of hemorrhage or vascular abnormality. Close  follow-up/further evaluation recommended.  4. Possible mucosal lesion in the trachea versus lobulated mucus.        The findings were discussed by telephone with Dr. Sanchez at 1235,  1248, 08/01/2020     This report was finalized on 8/1/2020 12:56 PM by Dr. Jayjay Corbett M.D.             Pertinent Labs     Results from last 7 days   Lab Units 08/06/20  0604 08/05/20  0510 08/04/20  0544 08/03/20  0940   WBC 10*3/mm3 8.30 6.22 6.00 6.79   HEMOGLOBIN g/dL 8.9* 8.9* 9.1* 9.3*   PLATELETS 10*3/mm3 78* 89* 93* 89*     Results from last 7 days   Lab Units 08/06/20  0604 08/05/20  0510 08/04/20  0544 08/03/20  0940   SODIUM mmol/L 133* 135* 134* 131*   POTASSIUM mmol/L 4.4 4.2 4.6 4.2   CHLORIDE mmol/L 99 100 97* 94*   CO2 mmol/L 21.9* 22.4 25.1 24.1   BUN mg/dL 56* 40* 47* 38*   CREATININE mg/dL 8.29* 7.19* 7.66* 7.16*   GLUCOSE mg/dL 142* 157* 122* 149*   Estimated Creatinine Clearance: 8 mL/min (A) (by C-G formula based on SCr of 8.29 mg/dL (H)).  Results from last 7 days   Lab Units 08/06/20  0604  08/05/20  0510 08/04/20  0544 08/03/20  0940 08/02/20  0941 08/01/20  1214   ALBUMIN g/dL 2.60* 2.80* 2.90* 3.00* 2.80* 3.20*   BILIRUBIN mg/dL  --   --   --   --  0.7 1.0   ALK PHOS U/L  --   --   --   --  59 66   AST (SGOT) U/L  --   --   --   --  19 19   ALT (SGPT) U/L  --   --   --   --  14 15     Results from last 7 days   Lab Units 08/06/20  0604 08/05/20  0510 08/04/20  0544 08/03/20  0940  08/01/20  1214   CALCIUM mg/dL 7.9* 7.9* 8.4* 8.3*   < > 7.8*   ALBUMIN g/dL 2.60* 2.80* 2.90* 3.00*   < > 3.20*   MAGNESIUM mg/dL  --   --   --  2.0  --  1.8   PHOSPHORUS mg/dL 6.8* 6.0* 8.1* 6.8*  --   --     < > = values in this interval not displayed.     Results from last 7 days   Lab Units 08/01/20  1307   AMMONIA umol/L 64*     Results from last 7 days   Lab Units 08/01/20  1729 08/01/20  1214   TROPONIN T ng/mL 0.129* 0.153*           Invalid input(s): LDLCALC  Results from last 7 days   Lab Units 08/01/20  1401   URINECX  No growth       Test Results Pending at Discharge       Discharge Details        Discharge Medications      New Medications      Instructions Start Date   OLANZapine zydis 10 MG disintegrating tablet  Commonly known as:  ZyPREXA   10 mg, Translingual, 2 times daily         Continue These Medications      Instructions Start Date   albuterol sulfate  (90 Base) MCG/ACT inhaler  Commonly known as:  PROVENTIL HFA;VENTOLIN HFA;PROAIR HFA   2 puffs, Inhalation, Every 6 Hours PRN      atorvastatin 40 MG tablet  Commonly known as:  LIPITOR   40 mg, Oral, Daily      clopidogrel 75 MG tablet  Commonly known as:  PLAVIX   75 mg, Oral, Daily      FLUoxetine 20 MG capsule  Commonly known as:  PROzac   20 mg, Oral, Daily      HYDROcodone-acetaminophen 5-325 MG per tablet  Commonly known as:  NORCO   1 tablet, Oral, Every 6 Hours PRN      hydrOXYzine 50 MG tablet  Commonly known as:  ATARAX   50 mg, Oral, Every 12 Hours      insulin detemir 100 UNIT/ML injection  Commonly known as:  LEVEMIR   30 Units,  Subcutaneous, Daily      ipratropium-albuterol  MCG/ACT inhaler  Commonly known as:  COMBIVENT RESPIMAT   2 puffs, Inhalation, Every 8 Hours PRN      losartan 25 MG tablet  Commonly known as:  COZAAR   25 mg, Oral, Daily      midodrine 10 MG tablet  Commonly known as:  PROAMATINE   10 mg, Oral, Daily, Give before dialysis, on dialysis day (Mon, Wed, Fri)      pantoprazole 40 MG EC tablet  Commonly known as:  PROTONIX   40 mg, Oral, 2 times daily      PROMOD PO   30 mL, Oral, Daily      sucralfate 1 g tablet  Commonly known as:  CARAFATE   1 g, Oral, 4 Times Daily      temazepam 15 MG capsule  Commonly known as:  RESTORIL   15 mg, Oral, Nightly      Velphoro 500 MG chewable tablet  Generic drug:  Sucroferric Oxyhydroxide   1 tablet, Oral, 3 Times Daily             No Known Allergies      Discharge Disposition:  Psychiatric Hospital or Unit (DC - External)    Discharge Diet:  Diet Order   Procedures   • Diet Soft Texture; Ground; Thin; Safe Tray       Discharge Activity:   Activity Instructions     Activity as Tolerated            CODE STATUS:    Code Status and Medical Interventions:   Ordered at: 08/01/20 1325     Limited Support to NOT Include:    Intubation    NIPPV (Non-Invasive Positive Pressure Support)     Level Of Support Discussed With:    Next of Kin (If No Surrogate)     Code Status:    No CPR     Medical Interventions (Level of Support Prior to Arrest):    Limited     Comments:    Sister Jb Ewing       No future appointments.  Additional Instructions for the Follow-ups that You Need to Schedule     Discharge Follow-up with PCP   As directed       Currently Documented PCP:    Matilde Hi MD    PCP Phone Number:    [phone]     Follow Up Details:  1-2 weeks           Follow-up Information     Matilde Hi MD .    Specialty:  Internal Medicine  Why:  1-2 weeks  Contact information:  [address]  [address]  [place] [address]  [phone]                   Additional Instructions  for the Follow-ups that You Need to Schedule     Discharge Follow-up with PCP   As directed       Currently Documented PCP:    Matilde Hi MD    PCP Phone Number:    [phone]     Follow Up Details:  1-2 weeks           Time Spent on Discharge:  Greater than 30 minutes      AMADA Darden  [facility]  20  4:02 PM                Electronically signed by Vince Otto MD at 20 1746     Ashley Bagley APRN at 20 0918     Attestation signed by Vince Otto MD at 20 1723    I agree with current new discharge plan.  I have reviewed documentation and agree.  Patient is currently in bilateral wrist restraints.  He will go to [facility].      Physical exam   General:  agitated in bilateral wrist restraints.  Head and ENT: normocephalic and atraumatic.  Lungs: symmetric expansion and equal air entry bilaterally.  Heart: regular rate, rhythm, no murmurs.  Abdomen: soft, nontender, bowel sounds present.  Extremities:  No clubbing, cyanosis or edema.  Skin:  Warm and no rash.                      Patient Name: Lucien Avalos  : 1945  MRN: [id number]    Date of Admission: 2020  Date of Discharge:  2020  Primary Care Physician: Matilde Hi MD      Chief Complaint:   Altered Mental Status and Hypotension      Discharge Diagnoses     Active Hospital Problems    Diagnosis  POA   • **Unresponsive [R41.89]  Unknown   • Acute metabolic encephalopathy [G93.41]  Yes   • COPD (chronic obstructive pulmonary disease) (CMS/[facility]) [J44.9]  Yes   • Diabetes mellitus (CMS/[facility]) [E11.9]  Yes   • Hypertension [I10]  Unknown   • ESRD (end stage renal disease) (CMS/[facility]) [N18.6]  Yes   • UTI (urinary tract infection) [N39.0]  Unknown   • Elevated troponin [R79.89]  Unknown   • Anemia due to chronic kidney disease [N18.9, D63.1]  Unknown      Resolved Hospital Problems   No resolved problems to display.        Hospital Course     Mr. Avalos is a 75 y.o.  male with a history of diabetes, hypertension, ESRD on HD, anemia and COPD who presented to [facility] initially unresponsive.  Please see the admitting history and physical for further details.  He was found to have likely vascular dementia and was admitted to the hospital for further evaluation and treatment.  Neurology and psychiatry saw in consultation.  Nephrology saw in consultation who managed patient's hemodialysis.  Patient remained pretty agitated throughout hospital stay requiring nonviolent restraints.  He was started on as needed Zyprexa that was switched to twice daily scheduled.  The patient became suicidal on 8/5/2020.  Patient deemed acceptable to be discharged to [facility] for further evaluation and treatment.  He is medically stable and will be discharged over there today.     ADDENDUM: no new events or changes overnight. patient discharge delayed due to lack of staff provided in the [facility]. He is stable to discharge today.     Day of Discharge     patient suicidal overnight. will be transferred to [facility] today. tolerated dialysis.     Denies chest pain, palpitations, SOA, edema, fever, chills, nausea vomiting.    Physical Exam:  Temp:  [98 °F (36.7 °C)-99.3 °F (37.4 °C)] 98.6 °F (37 °C)  Heart Rate:  [] 103  Resp:  [16-17] 16  BP: ()/(53-62) 101/62  Body mass index is 24.76 kg/m².  Physical Exam   Constitutional: He appears well-developed and well-nourished.   elderly, frail   HENT:   Head: Normocephalic and atraumatic.   Eyes: Conjunctivae and EOM are normal.   Neck: Neck supple. No JVD present.   Cardiovascular: Normal rate and regular rhythm.   Pulmonary/Chest: Effort normal and breath sounds normal.   Abdominal: Soft. Bowel sounds are normal. He exhibits no distension. There is no tenderness.   Musculoskeletal: Normal range of motion. He exhibits no edema.   Neurological: He is alert.   Skin: Skin is warm and dry.   Psychiatric: He has a normal mood and affect. His behavior is  normal.   Nursing note and vitals reviewed.      Consultants     Consult Orders (all) (From admission, onward)     Start     Ordered    08/05/20 1325  Inpatient Psychiatrist Consult  Once     Specialty:  Psychiatry  Provider:  Luis F Myers III, MD    08/05/20 1325    08/01/20 1914  Inpatient Nephrology Consult  Once     Specialty:  Nephrology  Provider:  Emory Mansfield MD    08/01/20 1915    08/01/20 1214  Inpatient Neurology Consult Stroke  Once     Specialty:  Neurology  Provider:  (Not yet assigned)    08/01/20 1213              Procedures     Imaging Results (All)     Procedure Component Value Units Date/Time    MRI Brain Without Contrast [[id number]] Collected:  08/01/20 1514     Updated:  08/01/20 1546    Narrative:       MRI BRAIN WO CONTRAST-     INDICATIONS: Altered mental status, possible hemorrhage     TECHNIQUE: Multiplanar multisequence noncontrast magnetic resonance  imaging of the brain.     COMPARISON: CT from 08/01/2020     FINDINGS:     Several sequences are significantly degraded by motion artifact,  limiting the assessment.     The diffusion-weighted images show no restricted diffusion to suggest  acute infarct.     The midline structures appear unremarkable.     The brain parenchyma shows moderate periventricular white matter T2  FLAIR signal hyperintensities suggesting chronic small vessel ischemic  change in a patient this age.     Flow voids in the major arteries at the base of the brain appear  unremarkable. Left vertebral artery is dominant.     The paranasal sinuses, mastoid air cells, and orbits appear  unremarkable.       Impression:          No acute infarct. No findings to suggest hemorrhage at the midbrain,  although the exam is somewhat limited by motion artifact; the CT density  of concern on the earlier CT may represent vascular anomaly. Follow-up  evaluation with enhanced MRI may be helpful when/if possible, and close  follow-up CT could be obtained to ensure  stability of the CT finding of  concern.     This report was finalized on 8/1/2020 3:43 PM by Dr. Jayjay Corbett M.D.       XR Chest 1 View [[id number]] Collected:  08/01/20 1325     Updated:  08/01/20 1337    Narrative:       XR CHEST 1 VW-     HISTORY: Male who is 75 years-old,  stroke     TECHNIQUE: Frontal view of the chest     COMPARISON: None available     FINDINGS: Heart is mildly enlarged. Mild prominence of vascular and  interstitial markings. Linear likely atelectasis or scarring the left  midlung. No pleural effusion or pneumothorax. No acute osseous process.       Impression:       Small likely atelectasis or scarring. Follow-up as  clinically indicated.     This report was finalized on 8/1/2020 1:34 PM by Dr. Jayjay Corbett M.D.       CT Angiogram Head [[id number]] Collected:  08/01/20 1242     Updated:  08/01/20 1259    Narrative:       CT ANGIOGRAM HEAD AND NECK AND CT PERFUSION STUDY     CLINICAL HISTORY: Altered mental status.     Radiation dose reduction techniques were utilized, including automated  exposure control and exposure modulation based on body size. CT scan of  the head was obtained with 3 mm axial images without the use  of IV contrast. The patient underwent a CT  perfusion study with a dynamic bolus of IV contrast. Standard perfusion  maps were constructed. The patient then underwent a CT angiogram of the  head and neck with 1 mm axial images following the administration of IV  contrast. Sagittal, coronal, and 3-dimensional reconstructed images were  obtained.  Percent stenosis was assessed in accordance with NASCET  criteria.     FINDINGS:     NONCONTRAST HEAD CT: On the noncontrast head CT, a 3-4 mm hyperdense  focus at the midbrain, for example axial image 17 could be a small focus  of blood or vascular anomaly, further evaluation with MRI may be  helpful, close interval follow-up can characterize change. Moderate  periventricular hypodensities suggest chronic small vessel  ischemic  change in a patient this age. Arterial calcifications are seen at the  base the brain.     No enhancing lesions of brain are noted following contrast material  administration.        CT PERFUSION STUDY:  No CBF (under 30%) deficit or perfusion abnormality  is demonstrated where the T MAX is greater than 6 seconds. The  calculated mismatch volume was 0 cc.     CTA HEAD and neck: The CT angiogram of the head and neck demonstrates  calcifications in the bilateral cervical and intracranial carotid  arteries, with estimated 65% narrowing at the cavernous segment of the  right ICA, 75% narrowing at the origin of the cavernous segment of the  left ICA otherwise without high-grade stenosis (0-49% stenosis). About  20% narrowing at the origin of the left cervical ICA. Otherwise, no  hemodynamically significant focal stenosis, aneurysm, or dissection in  the cervical carotid or vertebral arteries, or in the arteries at the  base of the brain. The left P1 segment is diminutive, the left PCA being  at least partially supplied by the anterior circulation. The lower  aspect of the left vertebral artery is obscured by attenuation artifact  from densely opacified left subclavian vein, precluding its assessment.     Lobulated mucous versus mucosal lesion measuring 1.7 cm in the posterior  right aspect of the trachea at the level of the thoracic inlet, axial  image 65, follow-up or direct visualization recommended.     Cervical spondyloarthropathy is present with uncovertebral joint and  facet hypertrophy result in bilateral neuroforaminal narrowing, more  prominent on the right at C4/5, C5/6, and on the left at C3/4, C5/6.             Impression:             1. No perfusion abnormality to suggest completed or threatened acute  infarct.  2. No arterial cutoff. Narrowing at the bilateral ICA cavernous  segments, estimated at 65% on the right, 75% on the left.  3. A 3 to 4 mm hyperdense focus at the mid brain remains  indeterminate,  could be a small focus of hemorrhage or vascular abnormality. Close  follow-up/further evaluation recommended.  4. Possible mucosal lesion in the trachea versus lobulated mucus.        The findings were discussed by telephone with Dr. Sanchez at 1235,  1248, 08/01/2020     This report was finalized on 8/1/2020 12:56 PM by Dr. Jayjay Corbett M.D.       CT Cerebral Perfusion With & Without Contrast [[id number]] Collected:  08/01/20 1242     Updated:  08/01/20 1259    Narrative:       CT ANGIOGRAM HEAD AND NECK AND CT PERFUSION STUDY     CLINICAL HISTORY: Altered mental status.     Radiation dose reduction techniques were utilized, including automated  exposure control and exposure modulation based on body size. CT scan of  the head was obtained with 3 mm axial images without the use  of IV contrast. The patient underwent a CT  perfusion study with a dynamic bolus of IV contrast. Standard perfusion  maps were constructed. The patient then underwent a CT angiogram of the  head and neck with 1 mm axial images following the administration of IV  contrast. Sagittal, coronal, and 3-dimensional reconstructed images were  obtained.  Percent stenosis was assessed in accordance with NASCET  criteria.     FINDINGS:     NONCONTRAST HEAD CT: On the noncontrast head CT, a 3-4 mm hyperdense  focus at the midbrain, for example axial image 17 could be a small focus  of blood or vascular anomaly, further evaluation with MRI may be  helpful, close interval follow-up can characterize change. Moderate  periventricular hypodensities suggest chronic small vessel ischemic  change in a patient this age. Arterial calcifications are seen at the  base the brain.     No enhancing lesions of brain are noted following contrast material  administration.        CT PERFUSION STUDY:  No CBF (under 30%) deficit or perfusion abnormality  is demonstrated where the T MAX is greater than 6 seconds. The  calculated mismatch volume was 0  cc.     CTA HEAD and neck: The CT angiogram of the head and neck demonstrates  calcifications in the bilateral cervical and intracranial carotid  arteries, with estimated 65% narrowing at the cavernous segment of the  right ICA, 75% narrowing at the origin of the cavernous segment of the  left ICA otherwise without high-grade stenosis (0-49% stenosis). About  20% narrowing at the origin of the left cervical ICA. Otherwise, no  hemodynamically significant focal stenosis, aneurysm, or dissection in  the cervical carotid or vertebral arteries, or in the arteries at the  base of the brain. The left P1 segment is diminutive, the left PCA being  at least partially supplied by the anterior circulation. The lower  aspect of the left vertebral artery is obscured by attenuation artifact  from densely opacified left subclavian vein, precluding its assessment.     Lobulated mucous versus mucosal lesion measuring 1.7 cm in the posterior  right aspect of the trachea at the level of the thoracic inlet, axial  image 65, follow-up or direct visualization recommended.     Cervical spondyloarthropathy is present with uncovertebral joint and  facet hypertrophy result in bilateral neuroforaminal narrowing, more  prominent on the right at C4/5, C5/6, and on the left at C3/4, C5/6.             Impression:             1. No perfusion abnormality to suggest completed or threatened acute  infarct.  2. No arterial cutoff. Narrowing at the bilateral ICA cavernous  segments, estimated at 65% on the right, 75% on the left.  3. A 3 to 4 mm hyperdense focus at the mid brain remains indeterminate,  could be a small focus of hemorrhage or vascular abnormality. Close  follow-up/further evaluation recommended.  4. Possible mucosal lesion in the trachea versus lobulated mucus.        The findings were discussed by telephone with Dr. Sanchez at 1235,  1248, 08/01/2020     This report was finalized on 8/1/2020 12:56 PM by Dr. Jayjay Corbett M.D.        CT Angiogram Neck [[id number]] Collected:  08/01/20 1242     Updated:  08/01/20 1259    Narrative:       CT ANGIOGRAM HEAD AND NECK AND CT PERFUSION STUDY     CLINICAL HISTORY: Altered mental status.     Radiation dose reduction techniques were utilized, including automated  exposure control and exposure modulation based on body size. CT scan of  the head was obtained with 3 mm axial images without the use  of IV contrast. The patient underwent a CT  perfusion study with a dynamic bolus of IV contrast. Standard perfusion  maps were constructed. The patient then underwent a CT angiogram of the  head and neck with 1 mm axial images following the administration of IV  contrast. Sagittal, coronal, and 3-dimensional reconstructed images were  obtained.  Percent stenosis was assessed in accordance with NASCET  criteria.     FINDINGS:     NONCONTRAST HEAD CT: On the noncontrast head CT, a 3-4 mm hyperdense  focus at the midbrain, for example axial image 17 could be a small focus  of blood or vascular anomaly, further evaluation with MRI may be  helpful, close interval follow-up can characterize change. Moderate  periventricular hypodensities suggest chronic small vessel ischemic  change in a patient this age. Arterial calcifications are seen at the  base the brain.     No enhancing lesions of brain are noted following contrast material  administration.        CT PERFUSION STUDY:  No CBF (under 30%) deficit or perfusion abnormality  is demonstrated where the T MAX is greater than 6 seconds. The  calculated mismatch volume was 0 cc.     CTA HEAD and neck: The CT angiogram of the head and neck demonstrates  calcifications in the bilateral cervical and intracranial carotid  arteries, with estimated 65% narrowing at the cavernous segment of the  right ICA, 75% narrowing at the origin of the cavernous segment of the  left ICA otherwise without high-grade stenosis (0-49% stenosis). About  20% narrowing at the origin of the left  cervical ICA. Otherwise, no  hemodynamically significant focal stenosis, aneurysm, or dissection in  the cervical carotid or vertebral arteries, or in the arteries at the  base of the brain. The left P1 segment is diminutive, the left PCA being  at least partially supplied by the anterior circulation. The lower  aspect of the left vertebral artery is obscured by attenuation artifact  from densely opacified left subclavian vein, precluding its assessment.     Lobulated mucous versus mucosal lesion measuring 1.7 cm in the posterior  right aspect of the trachea at the level of the thoracic inlet, axial  image 65, follow-up or direct visualization recommended.     Cervical spondyloarthropathy is present with uncovertebral joint and  facet hypertrophy result in bilateral neuroforaminal narrowing, more  prominent on the right at C4/5, C5/6, and on the left at C3/4, C5/6.             Impression:             1. No perfusion abnormality to suggest completed or threatened acute  infarct.  2. No arterial cutoff. Narrowing at the bilateral ICA cavernous  segments, estimated at 65% on the right, 75% on the left.  3. A 3 to 4 mm hyperdense focus at the mid brain remains indeterminate,  could be a small focus of hemorrhage or vascular abnormality. Close  follow-up/further evaluation recommended.  4. Possible mucosal lesion in the trachea versus lobulated mucus.        The findings were discussed by telephone with Dr. Sanchez at 1235,  1248, 08/01/2020     This report was finalized on 8/1/2020 12:56 PM by Dr. Jayjay Corbett M.D.             Pertinent Labs     Results from last 7 days   Lab Units 08/06/20  0604 08/05/20  0510 08/04/20  0544 08/03/20  0940   WBC 10*3/mm3 8.30 6.22 6.00 6.79   HEMOGLOBIN g/dL 8.9* 8.9* 9.1* 9.3*   PLATELETS 10*3/mm3 78* 89* 93* 89*     Results from last 7 days   Lab Units 08/06/20  0604 08/05/20  0510 08/04/20  0544 08/03/20  0940   SODIUM mmol/L 133* 135* 134* 131*   POTASSIUM mmol/L 4.4 4.2  4.6 4.2   CHLORIDE mmol/L 99 100 97* 94*   CO2 mmol/L 21.9* 22.4 25.1 24.1   BUN mg/dL 56* 40* 47* 38*   CREATININE mg/dL 8.29* 7.19* 7.66* 7.16*   GLUCOSE mg/dL 142* 157* 122* 149*   Estimated Creatinine Clearance: 8 mL/min (A) (by C-G formula based on SCr of 8.29 mg/dL (H)).  Results from last 7 days   Lab Units 08/06/20  0604 08/05/20  0510 08/04/20  0544 08/03/20  0940 08/02/20  0941 08/01/20  1214   ALBUMIN g/dL 2.60* 2.80* 2.90* 3.00* 2.80* 3.20*   BILIRUBIN mg/dL  --   --   --   --  0.7 1.0   ALK PHOS U/L  --   --   --   --  59 66   AST (SGOT) U/L  --   --   --   --  19 19   ALT (SGPT) U/L  --   --   --   --  14 15     Results from last 7 days   Lab Units 08/06/20  0604 08/05/20  0510 08/04/20  0544 08/03/20  0940  08/01/20  1214   CALCIUM mg/dL 7.9* 7.9* 8.4* 8.3*   < > 7.8*   ALBUMIN g/dL 2.60* 2.80* 2.90* 3.00*   < > 3.20*   MAGNESIUM mg/dL  --   --   --  2.0  --  1.8   PHOSPHORUS mg/dL 6.8* 6.0* 8.1* 6.8*  --   --     < > = values in this interval not displayed.     Results from last 7 days   Lab Units 08/01/20  1307   AMMONIA umol/L 64*     Results from last 7 days   Lab Units 08/01/20  1729 08/01/20  1214   TROPONIN T ng/mL 0.129* 0.153*           Invalid input(s): LDLCALC  Results from last 7 days   Lab Units 08/01/20  1401   URINECX  No growth       Test Results Pending at Discharge       Discharge Details        Discharge Medications      New Medications      Instructions Start Date   OLANZapine zydis 10 MG disintegrating tablet  Commonly known as:  ZyPREXA   10 mg, Translingual, 2 times daily         Continue These Medications      Instructions Start Date   albuterol sulfate  (90 Base) MCG/ACT inhaler  Commonly known as:  PROVENTIL HFA;VENTOLIN HFA;PROAIR HFA   2 puffs, Inhalation, Every 6 Hours PRN      atorvastatin 40 MG tablet  Commonly known as:  LIPITOR   40 mg, Oral, Daily      clopidogrel 75 MG tablet  Commonly known as:  PLAVIX   75 mg, Oral, Daily      FLUoxetine 20 MG  capsule  Commonly known as:  PROzac   20 mg, Oral, Daily      HYDROcodone-acetaminophen 5-325 MG per tablet  Commonly known as:  NORCO   1 tablet, Oral, Every 6 Hours PRN      hydrOXYzine 50 MG tablet  Commonly known as:  ATARAX   50 mg, Oral, Every 12 Hours      insulin detemir 100 UNIT/ML injection  Commonly known as:  LEVEMIR   30 Units, Subcutaneous, Daily      ipratropium-albuterol  MCG/ACT inhaler  Commonly known as:  COMBIVENT RESPIMAT   2 puffs, Inhalation, Every 8 Hours PRN      losartan 25 MG tablet  Commonly known as:  COZAAR   25 mg, Oral, Daily      midodrine 10 MG tablet  Commonly known as:  PROAMATINE   10 mg, Oral, Daily, Give before dialysis, on dialysis day (Mon, Wed, Fri)      pantoprazole 40 MG EC tablet  Commonly known as:  PROTONIX   40 mg, Oral, 2 times daily      PROMOD PO   30 mL, Oral, Daily      sucralfate 1 g tablet  Commonly known as:  CARAFATE   1 g, Oral, 4 Times Daily      temazepam 15 MG capsule  Commonly known as:  RESTORIL   15 mg, Oral, Nightly      Velphoro 500 MG chewable tablet  Generic drug:  Sucroferric Oxyhydroxide   1 tablet, Oral, 3 Times Daily             No Known Allergies      Discharge Disposition:  [facility] Hospital or Unit (DC - External)    Discharge Diet:  Diet Order   Procedures   • Diet Soft Texture; Ground; Thin; Safe Tray       Discharge Activity:   Activity Instructions     Activity as Tolerated            CODE STATUS:    Code Status and Medical Interventions:   Ordered at: 08/01/20 1327     Limited Support to NOT Include:    Intubation    NIPPV (Non-Invasive Positive Pressure Support)     Level Of Support Discussed With:    Next of Kin (If No Surrogate)     Code Status:    No CPR     Medical Interventions (Level of Support Prior to Arrest):    Limited     Comments:    Sister Jb Ewing       No future appointments.  Additional Instructions for the Follow-ups that You Need to Schedule     Discharge Follow-up with PCP   As directed       Currently  Documented PCP:    Matilde Hi MD    PCP Phone Number:    [phone]     Follow Up Details:  1-2 weeks           Follow-up Information     Matilde Hi MD .    Specialty:  Internal Medicine  Why:  1-2 weeks  Contact information:  [address]  [address]  [facility] [address]  [phone]                   Additional Instructions for the Follow-ups that You Need to Schedule     Discharge Follow-up with PCP   As directed       Currently Documented PCP:    Matilde Hi MD    PCP Phone Number:    [phone]     Follow Up Details:  1-2 weeks           Time Spent on Discharge:  Greater than 30 minutes      AMADA Darden  [facility]  08/07/20  4:02 PM                Electronically signed by Vince Otto MD at 08/07/20 1723     Vince Otto MD at 08/14/20 1839                              [facility]               [facility]    Date of Discharge:  8/14/2020    PCP: Matilde Hi MD    Discharge Diagnosis:   Active Hospital Problems    Diagnosis  POA   • **Unresponsive [R41.89]  Unknown   • Acute metabolic encephalopathy [G93.41]  Yes   • COPD (chronic obstructive pulmonary disease) (CMS/[facility]) [J44.9]  Yes   • Diabetes mellitus (CMS/[facility]) [E11.9]  Yes   • Hypertension [I10]  Unknown   • ESRD (end stage renal disease) (CMS/[facility]) [N18.6]  Yes   • UTI (urinary tract infection) [N39.0]  Unknown   • Elevated troponin [R79.89]  Unknown   • Anemia due to chronic kidney disease [N18.9, D63.1]  Unknown      Resolved Hospital Problems   No resolved problems to display.     Procedures Performed       Consults     Date and Time Order Name Status Description    8/11/2020 1504 Inpatient Psychiatrist Consult Completed     8/10/2020 1617 Hematology & Oncology Inpatient Consult Completed     8/10/2020 1209 Inpatient Gastroenterology Consult Completed     8/8/2020 0520 Inpatient Cardiology Consult      8/5/2020 1325 Inpatient Psychiatrist Consult Completed      8/3/2020 0030 Inpatient Psychiatrist Consult Completed     8/1/2020 2125 Inpatient Neurology Consult General Completed     8/1/2020 1915 Inpatient Nephrology Consult Completed     8/1/2020 1557 [facility] (on-call MD unless specified) Details Completed     8/1/2020 1213 Inpatient Neurology Consult Stroke Completed     8/1/2020 1213 Inpatient Neurology Consult Stroke Completed         Hospital Course   72 year old male presents to hospital, he has acute metabolic encephalopathy, patient has underlying psychiatric issues.  Patient has had a long hospital stay, he has increased agitation, Psychiatry has evaluated this patient, patient will be transitioned to inpatient hospice care, he has not been doing well, he is also dependent on dialysis, he does not want  Dialysis anymore.    Patient was seen this morning, he has been very poorly, we will transition him to comfort measures only.  Patient has made decisions for himself to be transitioned to hospice.  Time spent is more than 30 min.    Condition on Discharge: Improved.     Temp:  [97.4 °F (36.3 °C)-98.6 °F (37 °C)] 98.6 °F (37 °C)  Heart Rate:  [57-88] 57  Resp:  [16-18] 16  BP: (64-95)/(31-72) 64/31  Body mass index is 25.65 kg/m².    Physical Exam    General:  He is very lethargic  Head and ENT: normocephalic and atraumatic.  Lungs: symmetric expansion and equal air entry bilaterally.  Heart: regular rate, rhythm, no murmurs.  Abdomen: soft, nontender, bowel sounds present.  Extremities:  No clubbing, cyanosis or edema.  Neurologic:  disoriented  Psychiatry:   disoriented  Skin:  Warm and no rash.       Discharge Medications      New Medications      Instructions Start Date   OLANZapine zydis 10 MG disintegrating tablet  Commonly known as:  ZyPREXA   10 mg, Translingual, 2 times daily         Continue These Medications      Instructions Start Date   albuterol sulfate  (90 Base) MCG/ACT inhaler  Commonly known as:  PROVENTIL HFA;VENTOLIN HFA;PROAIR HFA   2  puffs, Inhalation, Every 6 Hours PRN      atorvastatin 40 MG tablet  Commonly known as:  LIPITOR   40 mg, Oral, Daily      clopidogrel 75 MG tablet  Commonly known as:  PLAVIX   75 mg, Oral, Daily      FLUoxetine 20 MG capsule  Commonly known as:  PROzac   20 mg, Oral, Daily      HYDROcodone-acetaminophen 5-325 MG per tablet  Commonly known as:  NORCO   1 tablet, Oral, Every 6 Hours PRN      hydrOXYzine 50 MG tablet  Commonly known as:  ATARAX   50 mg, Oral, Every 12 Hours      insulin detemir 100 UNIT/ML injection  Commonly known as:  LEVEMIR   30 Units, Subcutaneous, Daily      ipratropium-albuterol  MCG/ACT inhaler  Commonly known as:  COMBIVENT RESPIMAT   2 puffs, Inhalation, Every 8 Hours PRN      losartan 25 MG tablet  Commonly known as:  COZAAR   25 mg, Oral, Daily      midodrine 10 MG tablet  Commonly known as:  PROAMATINE   10 mg, Oral, Daily, Give before dialysis, on dialysis day (Mon, Wed, Fri)      pantoprazole 40 MG EC tablet  Commonly known as:  PROTONIX   40 mg, Oral, 2 times daily      PROMOD PO   30 mL, Oral, Daily      sucralfate 1 g tablet  Commonly known as:  CARAFATE   1 g, Oral, 4 Times Daily      temazepam 15 MG capsule  Commonly known as:  RESTORIL   15 mg, Oral, Nightly      Velphoro 500 MG chewable tablet  Generic drug:  Sucroferric Oxyhydroxide   1 tablet, Oral, 3 Times Daily            Diet Instructions     Diet: Regular; Thin      Discharge Diet:  Regular    Fluid Consistency:  Thin    soft texture, ground         Activity Instructions     Activity as Tolerated           Additional Instructions for the Follow-ups that You Need to Schedule     Discharge Follow-up with PCP   As directed       Currently Documented PCP:    Matilde Hi MD    PCP Phone Number:    [phone]     Follow Up Details:  1-2 weeks            Contact information for follow-up providers     Matilde Hi MD .    Specialty:  Internal Medicine  Why:  1-2 weeks  Contact information:  [address]  [facility] [address]  [phone]                   Contact information for after-discharge care     Destination     [facility] .    Service:  Skilled Nursing  Contact information:  [address] [facility] [address] [phone]                           Test Results Pending at Discharge     Vince Otto MD  08/14/20  18:39    Discharge time spent greater than 30 minutes.    Electronically signed by Vince Otto MD at 08/14/20 3759

## 2022-12-31 NOTE — THERAPY EVALUATION
"Acute Care - Speech Language Pathology   Swallow Initial Evaluation [facility]     Patient Name: Lucien Avalos  : 1945  MRN: [id number]  Today's Date: 8/3/2020               Admit Date: 2020    Visit Dx:     ICD-10-CM ICD-9-CM   1. Acute metabolic encephalopathy G93.41 348.31   2. ESRD (end stage renal disease) on dialysis (CMS/[facility]) N18.6 585.6    Z99.2 V45.11   3. Elevated troponin R79.89 790.6   4. Acute UTI N39.0 599.0     Patient Active Problem List   Diagnosis   • Acute metabolic encephalopathy   • Unresponsive   • COPD (chronic obstructive pulmonary disease) (CMS/[facility])   • Diabetes mellitus (CMS/[facility])   • Hypertension   • ESRD (end stage renal disease) (CMS/[facility])   • UTI (urinary tract infection)   • Elevated troponin   • Anemia due to chronic kidney disease     Past Medical History:   Diagnosis Date   • Anxiety    • CHF (congestive heart failure) (CMS/[facility])    • Chronic pain    • COPD (chronic obstructive pulmonary disease) (CMS/[facility])    • Diabetes mellitus (CMS/[facility])    • GERD (gastroesophageal reflux disease)    • Hyperlipidemia    • Hypertension    • Hypotension    • Insomnia    • Mood disorder (CMS/[facility])    • Renal failure    • Respiratory failure (CMS/[facility])      History reviewed. No pertinent surgical history.     SWALLOW EVALUATION (last 72 hours)      SLP Adult Swallow Evaluation     Row Name 20 1015                   Rehab Evaluation    Document Type  evaluation  -OC        Subjective Information  no complaints  -OC        Patient Observations  alert;cooperative;agree to therapy  -OC        Patient Effort  good  -OC        Symptoms Noted During/After Treatment  none  -OC           General Information    Patient Profile Reviewed  yes  -OC        Pertinent History Of Current Problem  \"Unresponsiveness noted at dialysis center after completion of dialysis with associated hypotension.\"  -OC        Current Method of Nutrition  NPO  -OC        Precautions/Limitations, Vision  WFL  -OC        " Precautions/Limitations, Hearing  WFL  -OC        Prior Level of Function-Communication  WFL  -OC        Prior Level of Function-Swallowing  other (see comments) unknown  -OC        Plans/Goals Discussed with  patient  -OC        Barriers to Rehab  none identified  -OC        Patient's Goals for Discharge  patient did not state  -OC           Pain Assessment    Additional Documentation  Pain Scale: Numbers Pre/Post-Treatment (Group)  -OC           Pain Scale: Numbers Pre/Post-Treatment    Pain Scale: Numbers, Pretreatment  0/10 - no pain  -OC        Pain Scale: Numbers, Post-Treatment  0/10 - no pain  -OC        Pre/Post Treatment Pain Comment  reports pain from stickers on chest, RN aware  -OC           Oral Motor and Function    Dentition Assessment  missing teeth  -OC        Secretion Management  WNL/WFL  -OC        Mucosal Quality  moist, healthy  -OC        Volitional Swallow  WFL  -OC        Volitional Cough  WFL  -OC           Oral Musculature and Cranial Nerve Assessment    Oral Motor General Assessment  other (see comments) difficult to assess, appears WFL  -OC           Clinical Swallow Eval    Oral Prep Phase  impaired  -OC        Oral Transit  WFL  -OC        Oral Residue  WFL  -OC        Pharyngeal Phase  suspected pharyngeal impairment  -OC        Clinical Swallow Evaluation Summary  The patient demonstrated no overt s/s aspiration with ice chips, thin via cup/straw, puree, and mechanical soft. Patient demonstrated poor oral manipulation wit dry solid and sneezing.   -OC           Clinical Impression    SLP Swallowing Diagnosis  functional oral phase;suspected pharyngeal dysfunction  -OC        Functional Impact  risk of aspiration/pneumonia  -OC        Rehab Potential/Prognosis, Swallowing  good, to achieve stated therapy goals  -OC        Swallow Criteria for Skilled Therapeutic Interventions Met  demonstrates skilled criteria  -OC           Recommendations    Therapy Frequency (Swallow)  PRN  -OC         Predicted Duration Therapy Intervention (Days)  until discharge  -OC        SLP Diet Recommendation  soft textures;ground;thin liquids  -OC        Recommended Diagnostics  reassess via clinical swallow evaluation  -OC        Recommended Precautions and Strategies  upright posture during/after eating;small bites of food and sips of liquid;alternate between small bites of food and sips of liquid  -OC        SLP Rec. for Method of Medication Administration  meds whole;meds crushed;with thin liquids;with pudding or applesauce  -OC        Monitor for Signs of Aspiration  yes;notify SLP if any concerns  -OC        Anticipated Dischage Disposition (SLP)  unknown  -OC           Swallow Goals (SLP)    Oral Nutrition/Hydration Goal Selection (SLP)  oral nutrition/hydration, SLP goal 1  -OC           Oral Nutrition/Hydration Goal 1 (SLP)    Oral Nutrition/Hydration Goal 1, SLP  Tolerate least restrictive diet with no overt s/s aspiration.   -OC        Time Frame (Oral Nutrition/Hydration Goal 1, SLP)  by discharge  -OC          User Key  (r) = Recorded By, (t) = Taken By, (c) = Cosigned By    Initials Name Effective Dates    OC Chula Castle MA,[facility]-SLP 06/08/18 -           EDUCATION  The patient has been educated in the following areas:   Dysphagia (Swallowing Impairment).    SLP Recommendation and Plan  SLP Swallowing Diagnosis: functional oral phase, suspected pharyngeal dysfunction  SLP Diet Recommendation: soft textures, ground, thin liquids  Recommended Precautions and Strategies: upright posture during/after eating, small bites of food and sips of liquid, alternate between small bites of food and sips of liquid  SLP Rec. for Method of Medication Administration: meds whole, meds crushed, with thin liquids, with pudding or applesauce     Monitor for Signs of Aspiration: yes, notify SLP if any concerns  Recommended Diagnostics: reassess via clinical swallow evaluation  Swallow Criteria for Skilled Therapeutic  Interventions Met: demonstrates skilled criteria  Anticipated Dischage Disposition (SLP): unknown  Rehab Potential/Prognosis, Swallowing: good, to achieve stated therapy goals  Therapy Frequency (Swallow): PRN  Predicted Duration Therapy Intervention (Days): until discharge       Plan of Care Reviewed With: patient  Outcome Summary: Clinical swallow eval completed. Recommend mechanical soft and thin liquids. Recommend meds whole/crushed with thin or puree. Recommend upright, slow rate, alternate liquids/solids. ST to follow for diet tolerance and re-eval as appropriate.    SLP GOALS     Row Name 08/03/20 1015             Oral Nutrition/Hydration Goal 1 (SLP)    Oral Nutrition/Hydration Goal 1, SLP  Tolerate least restrictive diet with no overt s/s aspiration.   -OC      Time Frame (Oral Nutrition/Hydration Goal 1, SLP)  by discharge  -OC        User Key  (r) = Recorded By, (t) = Taken By, (c) = Cosigned By    Initials Name Provider Type    Chula Frazier MA, CCC-SLP Speech and Language Pathologist           SLP Outcome Measures (last 72 hours)      SLP Outcome Measures     Row Name 08/03/20 1100             SLP Outcome Measures    Outcome Measure Used?  Adult NOMS  -OC         Adult FCM Scores    FCM Chosen  Swallowing  -OC      Swallowing FCM Score  4  -OC        User Key  (r) = Recorded By, (t) = Taken By, (c) = Cosigned By    Initials Name Effective Dates    Chula Frazier MA, CCC-SLP 06/08/18 -            Time Calculation:   Time Calculation- SLP     Row Name 08/03/20 1118             Time Calculation- SLP    SLP Start Time  1000  -OC      SLP Received On  08/03/20  -OC        User Key  (r) = Recorded By, (t) = Taken By, (c) = Cosigned By    Initials Name Provider Type    Chula Frazier MA,CCC-SLP Speech and Language Pathologist          Therapy Charges for Today     Code Description Service Date Service Provider Modifiers Qty    [id number]  ST EVAL ORAL PHARYNG SWALLOW 4 8/3/2020 Chula Castle MA,[facility]-SLP  GN 1               Chula Castle MA,CCC-SLP  8/3/2020   [facility]